# Patient Record
Sex: FEMALE | Race: WHITE | Employment: OTHER | URBAN - METROPOLITAN AREA
[De-identification: names, ages, dates, MRNs, and addresses within clinical notes are randomized per-mention and may not be internally consistent; named-entity substitution may affect disease eponyms.]

---

## 2017-06-07 PROBLEM — E55.9 VITAMIN D DEFICIENCY: Status: ACTIVE | Noted: 2017-06-07

## 2017-06-07 PROBLEM — K21.9 GASTROESOPHAGEAL REFLUX DISEASE WITHOUT ESOPHAGITIS: Status: ACTIVE | Noted: 2017-06-07

## 2018-05-31 ENCOUNTER — HOSPITAL ENCOUNTER (OUTPATIENT)
Dept: MAMMOGRAPHY | Age: 78
Discharge: HOME | End: 2018-05-31
Attending: INTERNAL MEDICINE
Payer: MEDICARE

## 2018-05-31 DIAGNOSIS — Z12.31 VISIT FOR SCREENING MAMMOGRAM: ICD-10-CM

## 2018-05-31 DIAGNOSIS — M85.89 OSTEOPENIA OF MULTIPLE SITES: ICD-10-CM

## 2018-05-31 PROCEDURE — 77067 SCR MAMMO BI INCL CAD: CPT

## 2018-05-31 PROCEDURE — 77080 DXA BONE DENSITY AXIAL: CPT

## 2019-10-08 ENCOUNTER — HOSPITAL ENCOUNTER (OUTPATIENT)
Dept: MAMMOGRAPHY | Age: 79
Discharge: HOME | End: 2019-10-08
Attending: INTERNAL MEDICINE
Payer: MEDICARE

## 2019-10-08 DIAGNOSIS — Z12.39 SCREENING FOR BREAST CANCER: ICD-10-CM

## 2019-10-08 PROCEDURE — 77067 SCR MAMMO BI INCL CAD: CPT

## 2020-02-19 PROBLEM — M25.561 RIGHT KNEE PAIN: Status: ACTIVE | Noted: 2020-02-19

## 2020-02-19 PROBLEM — M25.551 RIGHT HIP PAIN: Status: ACTIVE | Noted: 2020-02-19

## 2020-02-19 PROBLEM — M54.31 SCIATICA OF RIGHT SIDE: Status: ACTIVE | Noted: 2020-02-19

## 2020-02-19 PROBLEM — M54.16 LUMBAR RADICULOPATHY: Status: ACTIVE | Noted: 2020-02-19

## 2020-02-19 PROBLEM — M51.36 DDD (DEGENERATIVE DISC DISEASE), LUMBAR: Status: ACTIVE | Noted: 2020-02-19

## 2020-05-29 ENCOUNTER — APPOINTMENT (OUTPATIENT)
Dept: GENERAL RADIOLOGY | Age: 80
End: 2020-05-29
Attending: EMERGENCY MEDICINE
Payer: MEDICARE

## 2020-05-29 ENCOUNTER — HOSPITAL ENCOUNTER (EMERGENCY)
Age: 80
Discharge: HOME | End: 2020-05-29
Attending: EMERGENCY MEDICINE
Payer: MEDICARE

## 2020-05-29 VITALS
TEMPERATURE: 98 F | DIASTOLIC BLOOD PRESSURE: 70 MMHG | SYSTOLIC BLOOD PRESSURE: 151 MMHG | BODY MASS INDEX: 23.9 KG/M2 | HEIGHT: 64 IN | WEIGHT: 140 LBS | HEART RATE: 70 BPM | OXYGEN SATURATION: 99 % | RESPIRATION RATE: 20 BRPM

## 2020-05-29 DIAGNOSIS — S43.014A ANTERIOR DISLOCATION OF RIGHT SHOULDER, INITIAL ENCOUNTER: Primary | ICD-10-CM

## 2020-05-29 PROCEDURE — 74011250636 HC RX REV CODE- 250/636: Performed by: EMERGENCY MEDICINE

## 2020-05-29 PROCEDURE — 99285 EMERGENCY DEPT VISIT HI MDM: CPT

## 2020-05-29 PROCEDURE — 75810000301 HC ER LEVEL 1 CLOSED TREATMNT FRACTURE/DISLOCATION

## 2020-05-29 PROCEDURE — 73030 X-RAY EXAM OF SHOULDER: CPT

## 2020-05-29 PROCEDURE — 73020 X-RAY EXAM OF SHOULDER: CPT

## 2020-05-29 RX ORDER — PRAVASTATIN SODIUM 20 MG/1
20 TABLET ORAL
COMMUNITY
End: 2020-06-30 | Stop reason: ALTCHOICE

## 2020-05-29 RX ORDER — OXYCODONE HYDROCHLORIDE 5 MG/1
5 TABLET ORAL
Qty: 13 TAB | Refills: 0 | Status: SHIPPED | OUTPATIENT
Start: 2020-05-29 | End: 2020-06-01

## 2020-05-29 RX ORDER — PROPOFOL 10 MG/ML
200 INJECTION, EMULSION INTRAVENOUS
Status: COMPLETED | OUTPATIENT
Start: 2020-05-29 | End: 2020-05-29

## 2020-05-29 RX ADMIN — PROPOFOL INJECTABLE EMULSION 200 MG: 10 INJECTION, EMULSION INTRAVENOUS at 09:09

## 2020-05-29 RX ADMIN — SODIUM CHLORIDE 500 ML: 900 INJECTION, SOLUTION INTRAVENOUS at 09:09

## 2020-05-29 NOTE — DISCHARGE INSTRUCTIONS
Take pain medication as prescribed  May alternate between Tylenol or ibuprofen as well  Keep arm in sling  Call and arrange follow-up with orthopedics  Return to the ER for any new or life-threatening illnesses    Dislocated Shoulder: Care Instructions  Your Care Instructions     When the upper arm comes out of the shoulder socket, it is called a dislocated shoulder. After the doctor puts the shoulder back in place, he or she may put your arm in a sling or shoulder immobilizer. This will keep it from moving. Exercise and physical therapy can help your shoulder get strong and move normally again. It may take up to a year for your shoulder to heal and be free of pain. If your shoulder keeps coming out of place, talk to your doctor about surgery. It can prevent dislocations. You may have had a sedative to help you relax. You may be unsteady after having sedation. It can take a few hours for the medicine's effects to wear off. Common side effects of sedation include nausea, vomiting, and feeling sleepy or tired. The doctor has checked you carefully, but problems can develop later. If you notice any problems or new symptoms, get medical treatment right away. Follow-up care is a key part of your treatment and safety. Be sure to make and go to all appointments, and call your doctor if you are having problems. It's also a good idea to know your test results and keep a list of the medicines you take. How can you care for yourself at home? · If the doctor gave you a sedative:  ? For 24 hours, don't do anything that requires attention to detail. This includes going to work, making important decisions, or signing any legal documents. It takes time for the medicine's effects to completely wear off.  ? For your safety, do not drive or operate any machinery that could be dangerous. Wait until the medicine wears off and you can think clearly and react easily.   · If your doctor put your arm in a sling or shoulder immobilizer, wear it as directed. · Take pain medicines exactly as directed. ? If the doctor gave you a prescription medicine for pain, take it as prescribed. ? If you are not taking a prescription pain medicine, ask your doctor if you can take an over-the-counter medicine. · Put ice or a cold pack on your shoulder for 10 to 20 minutes at a time. Try to do this every 1 to 2 hours for the next 3 days (when you are awake). Put a thin cloth between the ice and your skin. · You may use warm packs after the first 3 days for 15 to 20 minutes at a time. This can ease pain. · If your doctor gave you exercises to do at home, do them exactly as your doctor told you. · Do not do anything that makes the pain worse. When should you call for help? [id number] anytime you think you may need emergency care. For example, call if:  · You have trouble breathing. · You passed out (lost consciousness). Call your doctor now or seek immediate medical care if:  · You have new or worse nausea or vomiting. · You have new or worse pain. · Your hand or fingers are cool or pale or change color. · You have tingling, weakness, or numbness in your hand or fingers. Watch closely for changes in your health, and be sure to contact your doctor if:  · You do not get better as expected. Where can you learn more? Go to http://vinh-mario.info/  Enter L766 in the search box to learn more about \"Dislocated Shoulder: Care Instructions. \"  Current as of: March 2, 2020               Content Version: 12.5  © 4152-7345 Healthwise, Incorporated. Care instructions adapted under license by [employer] (which disclaims liability or warranty for this information). If you have questions about a medical condition or this instruction, always ask your healthcare professional. [address] any warranty or liability for your use of this information.          Patient Education        Shoulder Dislocation: Rehab Exercises  Introduction  Here are some examples of exercises for you to try. The exercises may be suggested for a condition or for rehabilitation. Start each exercise slowly. Ease off the exercises if you start to have pain. You will be told when to start these exercises and which ones will work best for you. How to do the exercises  Shoulder flexion (lying down)   For this exercise, you will need a wand. To make a wand, use a piece of PVC pipe or a broom handle with the broom removed. Make the wand about a foot wider than your shoulders. 1. Lie on your back, holding a wand with your hands. Your palms should face down as you hold the wand. Place your hands slightly wider than your shoulders. 2. Keeping your elbows straight, slowly raise your arms over your head until you feel a stretch in your shoulders, upper back, and chest.  3. Hold 15 to 30 seconds. 4. Repeat 2 to 4 times. Shoulder blade squeeze   1. While standing with your arms at your sides, squeeze your shoulder blades together. Do not raise your shoulders as you are squeezing. 2. Hold for 6 seconds. 3. Repeat 8 to 12 times. Internal rotator strengthening exercise   For this exercise, you will need elastic exercise material, such as surgical tubing or Thera-Band. 1. Begin by tying a piece of elastic exercise material to a doorknob. 2. Stand or sit with your shoulder relaxed and your elbow bent 90 degrees (like the angle of the letter \"L\"). Your upper arm should rest comfortably against your side. Squeeze a rolled towel between your elbow and your body for comfort and to help keep your arm at your side. 3. Hold one end of the elastic band in the hand of the painful arm. 4. Rotate your forearm toward your body until it touches your belly. 5. Keep your elbow and upper arm firmly tucked against the towel roll or the side of your body during this movement. 6. Repeat 8 to 12 times. Isometric shoulder external rotation   1.  Stand with your affected arm close to a wall. 2. Bend your arm up so your elbow is at a 90 degree angle (like the letter \"L\"), and turn your palm as if you are about to shake someone's hand. 3. Hold your forearm and elbow close to the wall. Press the back of your hand into the wall with moderate pressure. 4. Hold for a count of 6.  5. Repeat 8 to 12 times. Isometric shoulder abduction   1. Stand with your affected arm close to a wall. 2. Bend your arm up so your elbow is at a 90 degree angle (like the letter \"L\"), and turn your palm as if you are about to shake someone's hand. 3. Hold your forearm and elbow close to the wall. Press your elbow into the wall with moderate pressure. 4. Hold for a count of 6.  5. Repeat 8 to 12 times. Wall push-ups   1. Stand against a wall with your feet about 12 to 24 inches away from the wall. If you feel any pain when you do this exercise, stand closer to the wall. 2. Place your hands on the wall slightly wider apart than your shoulders, and lean forward. 3. Gently lean your body toward the wall. Then push back to your starting position. Keep the motion smooth and controlled. 4. Repeat 8 to 12 times. Follow-up care is a key part of your treatment and safety. Be sure to make and go to all appointments, and call your doctor if you are having problems. It's also a good idea to know your test results and keep a list of the medicines you take. Where can you learn more? Go to http://vinh-mario.info/  Enter V550 in the search box to learn more about \"Shoulder Dislocation: Rehab Exercises. \"  Current as of: March 2, 2020               Content Version: 12.5  © 9897-7809 Healthwise, [employer]. Care instructions adapted under license by [employer] (which disclaims liability or warranty for this information).  If you have questions about a medical condition or this instruction, always ask your healthcare professional. Mary Nettles disclaims any warranty or liability for your use of this information.

## 2020-05-29 NOTE — ED NOTES
I have reviewed discharge instructions with the patient. The patient verbalized understanding. Patient left ED via Discharge Method: ambulatory to Home with (spouse). Opportunity for questions and clarification provided. Patient given 1 scripts. To continue your aftercare when you leave the hospital, you may receive an automated call from our care team to check in on how you are doing. This is a free service and part of our promise to provide the best care and service to meet your aftercare needs.  If you have questions, or wish to unsubscribe from this service please call [phone]. Thank you for Choosing our [facility] Emergency Department.

## 2020-05-29 NOTE — ED PROVIDER NOTES
Patient presents to the ER complaint of right shoulder pain. Patient reports she had a mechanical fall this morning. States she landed on her right arm. Reports significant pain in her right shoulder. Denies any head trauma loss of consciousness. Denies any chest pain or abdominal pain. Has been ambulatory without any difficulty. The history is provided by the patient. Fall   The accident occurred less than 1 hour ago. The fall occurred while walking. The pain is present in the right shoulder. The pain is at a severity of 5/10. The pain is moderate. She was ambulatory at the scene. There was no entrapment after the fall. There was no alcohol use involved in the accident. Pertinent negatives include no visual change, no numbness, no abdominal pain, no vomiting, no headaches, no loss of consciousness and no tingling. The symptoms are aggravated by use of injured limb. Past Medical History:   Diagnosis Date    DDD (degenerative disc disease), lumbar 2/19/2020    DM2 (diabetes mellitus, type 2) ([facility] [address] 3/4/2014    Gastroesophageal reflux disease without esophagitis 5/22/2015    HLD (hyperlipidemia)     HTN (hypertension)     Menopause     Sciatica of right side 2/19/2020    Type II or unspecified type diabetes mellitus without mention of complication, uncontrolled 9/4/2014       Past Surgical History:   Procedure Laterality Date    HX COLONOSCOPY  01/25/2010    HX HYSTERECTOMY      10 years ago    HX OTHER SURGICAL      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  HX PARTIAL THYROIDECTOMY      Lobectomy for benign mass.     NEUROLOGICAL PROCEDURE UNLISTED  2014    [facility] S[facility] [place]         Family History:   Problem Relation Age of Onset    Heart Disease Father 76    Kidney Disease Father         hepatitis, post-surgery (tranfusion)    Cancer Mother [de-identified]        Breast    Breast Cancer Mother 80    Alzheimer Sister [de-identified]    Arthritis-osteo Sister     Arthritis-osteo Brother         Back surgery    Diabetes Neg Hx        Social History     Socioeconomic History    Marital status:      Spouse name: Not on file    Number of children: Not on file    Years of education: Not on file    Highest education level: Not on file   Occupational History    Not on file   Social Needs    Financial resource strain: Not on file    Food insecurity     Worry: Not on file     Inability: Not on file    Transportation needs     Medical: Not on file     Non-medical: Not on file   Tobacco Use    Smoking status: Never Smoker    Smokeless tobacco: Never Used   Substance and Sexual Activity    Alcohol use: No    Drug use: No    Sexual activity: Yes     Partners: Male     Birth control/protection: Surgical   Lifestyle    Physical activity     Days per week: Not on file     Minutes per session: Not on file    Stress: Not on file   Relationships    Social connections     Talks on phone: Not on file     Gets together: Not on file     Attends Sabianism service: Not on file     Active member of club or organization: Not on file     Attends meetings of clubs or organizations: Not on file     Relationship status: Not on file    Intimate partner violence     Fear of current or ex partner: Not on file     Emotionally abused: Not on file     Physically abused: Not on file     Forced sexual activity: Not on file   Other Topics Concern     Service Not Asked    Blood Transfusions Not Asked    Caffeine Concern Not Asked    Occupational Exposure Not Asked   Amee Stairs Hazards Not Asked    Sleep Concern Not Asked    Stress Concern Not Asked    Weight Concern Not Asked    Special Diet Not Asked    Back Care Not Asked    Exercise Not Asked    Bike Helmet Not Asked   [place] Not Asked    Self-Exams Not Asked   Social History Narrative    Not on file         ALLERGIES: Acetaminophen and Lortab [hydrocodone-acetaminophen]    Review of Systems   Constitutional: Negative for chills, diaphoresis and fatigue.    HENT: Negative for facial swelling. Eyes: Negative for photophobia. Respiratory: Negative for choking, chest tightness and shortness of breath. Cardiovascular: Negative for chest pain. Gastrointestinal: Negative for abdominal pain and vomiting. Musculoskeletal: Negative for gait problem, neck pain and neck stiffness. Neurological: Negative for tingling, loss of consciousness, numbness and headaches. Psychiatric/Behavioral: Negative for confusion and hallucinations. All other systems reviewed and are negative. Vitals:    05/29/20 0814   BP: 163/70   Pulse: 63   Resp: 16   Temp: 98 °F (36.7 °C)   SpO2: 97%   Weight: 63.5 kg (140 lb)   Height: 5' 4\" (1.626 m)            Physical Exam  Vitals signs and nursing note reviewed. Constitutional:       Appearance: Normal appearance. HENT:      Head: Normocephalic and atraumatic. Eyes:      Extraocular Movements: Extraocular movements intact. Conjunctiva/sclera: Conjunctivae normal.      Pupils: Pupils are equal, round, and reactive to light. Neck:      Musculoskeletal: Normal range of motion and neck supple. Cardiovascular:      Rate and Rhythm: Normal rate and regular rhythm. Pulses: Normal pulses. Heart sounds: Normal heart sounds. No murmur. Pulmonary:      Effort: Pulmonary effort is normal. No respiratory distress. Breath sounds: Normal breath sounds. Musculoskeletal:      Right shoulder: She exhibits decreased range of motion and tenderness. Right elbow: She exhibits normal range of motion. No tenderness found. Right wrist: She exhibits normal range of motion and no tenderness. Skin:     Capillary Refill: Capillary refill takes less than 2 seconds. Neurological:      General: No focal deficit present. Mental Status: She is alert and oriented to person, place, and time. Mental status is at baseline. MDM  Number of Diagnoses or Management Options  Diagnosis management comments:  Will obtain x-ray of right shoulder.     8:36 AM  X-rays consistent with dislocation, likely anterior inferior           Amount and/or Complexity of Data Reviewed  Tests in the radiology section of CPT®: ordered and reviewed    Risk of Complications, Morbidity, and/or Mortality  Presenting problems: low  Diagnostic procedures: low  Management options: low    Patient Progress  Patient progress: stable         Procedural Sedation  Date/Time: 5/29/2020 9:19 AM  Performed by: Leonila Shaw MD  Authorized by: Leonila Shaw MD     Consent:     Consent obtained:  Written    Consent given by:  Patient    Risks discussed:  Prolonged hypoxia resulting in organ damage, prolonged sedation necessitating reversal, respiratory compromise necessitating ventilatory assistance and intubation, inadequate sedation and nausea    Alternatives discussed:  Analgesia without sedation and anxiolysis  Indications:     Procedure performed:  Dislocation reduction    Procedure necessitating sedation performed by:  Physician performing sedation    Intended level of sedation:  Moderate (conscious sedation)  Pre-sedation assessment:     Time since last food or drink:  4 hours    ASA classification: class 2 - patient with mild systemic disease      Neck mobility: normal      Mouth opening:  3 or more finger widths    Thyromental distance:  3 finger widths    Mallampati score:  II - soft palate, uvula, fauces visible    Pre-sedation assessments completed and reviewed: airway patency, cardiovascular function, hydration status, mental status, nausea/vomiting, pain level, respiratory function and temperature      History of difficult intubation: no      Pre-sedation assessment completed:  5/29/2020 9:00 AM  Immediate pre-procedure details:     Reassessment: Patient reassessed immediately prior to procedure      Reviewed: vital signs      Verified: bag valve mask available, emergency equipment available and intubation equipment available    Procedure details (see MAR for exact dosages):     Sedation start time:  5/29/2020 9:10 AM    Preoxygenation:  Nasal cannula    Sedation:  Propofol    Intra-procedure monitoring:  Blood pressure monitoring, continuous capnometry, continuous pulse oximetry, frequent LOC assessments and cardiac monitor    Intra-procedure events: none      Sedation end time:  5/29/2020 9:20 AM    Total sedation time (minutes):  10  Post-procedure details:     Post-sedation assessment completed:  5/29/2020 9:34 AM    Attendance: Constant attendance by certified staff until patient recovered      Recovery: Patient returned to pre-procedure baseline      Post-sedation assessments completed and reviewed: airway patency, cardiovascular function, hydration status, mental status, nausea/vomiting and respiratory function      Specimens recovered:  None    Patient is stable for discharge or admission: yes      Patient tolerance: Tolerated well, no immediate complications  Reduction of Joint  Date/Time: 5/29/2020 9:21 AM  Performed by: Godfrey Mccarthy MD  Authorized by: Godfrey Mccarthy MD     Consent:     Consent obtained:  Written    Consent given by:  Patient    Risks discussed:  Nerve damage, pain and vascular damage    Alternatives discussed:  Delayed treatment, alternative treatment, observation and referral  Injury:     Injury location:  Shoulder    Shoulder injury location:  R shoulder    Hill-Sachs deformity: no    Pre-procedure assessment:     Neurological function: normal      Distal perfusion: normal      Range of motion: normal    Sedation:     Sedation type: Moderate (conscious) sedation  Procedure details:     Manipulation performed: yes      Skeletal traction used: yes      Reduction successful: yes      X-ray confirmed reduction: yes      Immobilization:  Sling  Post-procedure assessment:     Neurological function: normal      Distal perfusion: normal      Range of motion: normal      Patient tolerance of procedure:   Tolerated well, no immediate complications

## 2020-05-29 NOTE — ED TRIAGE NOTES
Pt states she slipped on a step this morning and fell onto the hardwood floor. States pain in the right shoulder.

## 2020-06-01 ENCOUNTER — PATIENT OUTREACH (OUTPATIENT)
Dept: CASE MANAGEMENT | Age: 80
End: 2020-06-01

## 2020-06-01 NOTE — PROGRESS NOTES
Patient contacted regarding recent visit for viral symptoms. This [place] contacted the patient by telephone to perform post discharge call. Verified name and  with patient as identifiers. Provided introduction to self, and reason for call due to viral symptoms of infection and/or exposure to COVID-19. Patient presented to emergency department/flu clinic with complaints of viral symptoms/exposure to COVID. Patient reports symptoms are improving. Due to no new or worsening symptoms the RN CTN/ACM was not notified for escalation. Discussed exposure protocols and quarantine with CDC Guidelines What To Do If You Are Sick    Patient was given an opportunity for questions and concerns. Stay home except to get medical care  Separate yourself from other people and animals in your home  Call ahead before visiting your doctor  Wear a facemask  Cover your coughs and sneezes  Clean your hands often  Avoid sharing personal household items  Clean all high-touch surfaces everyday    Monitor your symptoms  Seek prompt medical attention if your illness is worsening (e.g., difficulty breathing). Before seeking care, call your healthcare provider and tell them that you have, or are being evaluated for, COVID-19. Put on a facemask before you enter the facility. These steps will help the healthcare provider's office to keep other people in the office or waiting room from getting infected or exposed. Ask your healthcare provider to call the local or Critical access hospital health department. Persons who are placed under If you have a medical emergency and need to call 911, notify the dispatch personnel that you have, or are being evaluated for COVID-19. If possible, put on a facemask before emergency medical services arrive. The patient agrees to contact the [facility] exposure line [phone], local health department [facility] [address] [place] [address]: ([phone]) and PCP office for questions related to their healthcare. Author provided contact information for future reference. Patient/family/caregiver given information for [employer] and agrees to enroll no  Patient preferred e-mail:   Patient preferred phone contact:    Based on Loop alert triggers, patient will be contacted by nurse care manager for worsening symptoms. Patient stated she will think about signing up for [facility]. She wanted to talk it over with her . She said she will let me know when I make my Follow-up call in 7-14 days. Patient stated she saw Dr Thomas Almodovar this morning, she does not need surgery on her shoulder.

## 2021-04-26 ENCOUNTER — HOSPITAL ENCOUNTER (EMERGENCY)
Age: 81
Discharge: HOME | DRG: 389 | End: 2021-04-26
Attending: EMERGENCY MEDICINE
Payer: MEDICARE

## 2021-04-26 ENCOUNTER — APPOINTMENT (OUTPATIENT)
Dept: CT IMAGING | Age: 81
DRG: 389 | End: 2021-04-26
Attending: PHYSICIAN ASSISTANT
Payer: MEDICARE

## 2021-04-26 VITALS
DIASTOLIC BLOOD PRESSURE: 74 MMHG | HEART RATE: 64 BPM | BODY MASS INDEX: 22.88 KG/M2 | RESPIRATION RATE: 16 BRPM | TEMPERATURE: 98.1 F | HEIGHT: 64 IN | OXYGEN SATURATION: 99 % | WEIGHT: 134 LBS | SYSTOLIC BLOOD PRESSURE: 176 MMHG

## 2021-04-26 DIAGNOSIS — R10.84 ABDOMINAL PAIN, GENERALIZED: ICD-10-CM

## 2021-04-26 DIAGNOSIS — N39.0 URINARY TRACT INFECTION WITHOUT HEMATURIA, SITE UNSPECIFIED: Primary | ICD-10-CM

## 2021-04-26 LAB
ALBUMIN SERPL-MCNC: 4.2 G/DL (ref 3.2–4.6)
ALBUMIN/GLOB SERPL: 1.2 {RATIO} (ref 1.2–3.5)
ALP SERPL-CCNC: 66 U/L (ref 50–136)
ALT SERPL-CCNC: 14 U/L (ref 12–65)
ANION GAP SERPL CALC-SCNC: 5 MMOL/L (ref 7–16)
AST SERPL-CCNC: 11 U/L (ref 15–37)
BACTERIA URNS QL MICRO: ABNORMAL /HPF
BASOPHILS # BLD: 0.1 K/UL (ref 0–0.2)
BASOPHILS NFR BLD: 1 % (ref 0–2)
BILIRUB SERPL-MCNC: 0.4 MG/DL (ref 0.2–1.1)
BUN SERPL-MCNC: 17 MG/DL (ref 8–23)
CALCIUM SERPL-MCNC: 9.9 MG/DL (ref 8.3–10.4)
CASTS URNS QL MICRO: ABNORMAL /LPF
CHLORIDE SERPL-SCNC: 104 MMOL/L (ref 98–107)
CO2 SERPL-SCNC: 30 MMOL/L (ref 21–32)
CREAT SERPL-MCNC: 0.73 MG/DL (ref 0.6–1)
DIFFERENTIAL METHOD BLD: ABNORMAL
EOSINOPHIL # BLD: 0.1 K/UL (ref 0–0.8)
EOSINOPHIL NFR BLD: 1 % (ref 0.5–7.8)
EPI CELLS #/AREA URNS HPF: ABNORMAL /HPF
ERYTHROCYTE [DISTWIDTH] IN BLOOD BY AUTOMATED COUNT: 12.2 % (ref 11.9–14.6)
GLOBULIN SER CALC-MCNC: 3.4 G/DL (ref 2.3–3.5)
GLUCOSE BLD STRIP.AUTO-MCNC: 110 MG/DL (ref 65–100)
GLUCOSE SERPL-MCNC: 164 MG/DL (ref 65–100)
HCT VFR BLD AUTO: 41 % (ref 35.8–46.3)
HGB BLD-MCNC: 14.1 G/DL (ref 11.7–15.4)
IMM GRANULOCYTES # BLD AUTO: 0 K/UL (ref 0–0.5)
IMM GRANULOCYTES NFR BLD AUTO: 0 % (ref 0–5)
LACTATE SERPL-SCNC: 1.7 MMOL/L (ref 0.4–2)
LACTATE SERPL-SCNC: 2.1 MMOL/L (ref 0.4–2)
LIPASE SERPL-CCNC: 93 U/L (ref 73–393)
LYMPHOCYTES # BLD: 1.4 K/UL (ref 0.5–4.6)
LYMPHOCYTES NFR BLD: 14 % (ref 13–44)
MCH RBC QN AUTO: 33.2 PG (ref 26.1–32.9)
MCHC RBC AUTO-ENTMCNC: 34.4 G/DL (ref 31.4–35)
MCV RBC AUTO: 96.5 FL (ref 79.6–97.8)
MONOCYTES # BLD: 0.7 K/UL (ref 0.1–1.3)
MONOCYTES NFR BLD: 7 % (ref 4–12)
NEUTS SEG # BLD: 7.7 K/UL (ref 1.7–8.2)
NEUTS SEG NFR BLD: 78 % (ref 43–78)
NRBC # BLD: 0 K/UL (ref 0–0.2)
PLATELET # BLD AUTO: 237 K/UL (ref 150–450)
PMV BLD AUTO: 9.6 FL (ref 9.4–12.3)
POTASSIUM SERPL-SCNC: 3.9 MMOL/L (ref 3.5–5.1)
PROT SERPL-MCNC: 7.6 G/DL (ref 6.3–8.2)
RBC # BLD AUTO: 4.25 M/UL (ref 4.05–5.2)
RBC #/AREA URNS HPF: ABNORMAL /HPF
SERVICE CMNT-IMP: ABNORMAL
SODIUM SERPL-SCNC: 139 MMOL/L (ref 136–145)
WBC # BLD AUTO: 10 K/UL (ref 4.3–11.1)
WBC URNS QL MICRO: ABNORMAL /HPF

## 2021-04-26 PROCEDURE — 83605 ASSAY OF LACTIC ACID: CPT

## 2021-04-26 PROCEDURE — 74011250636 HC RX REV CODE- 250/636: Performed by: PHYSICIAN ASSISTANT

## 2021-04-26 PROCEDURE — 81015 MICROSCOPIC EXAM OF URINE: CPT

## 2021-04-26 PROCEDURE — 96361 HYDRATE IV INFUSION ADD-ON: CPT

## 2021-04-26 PROCEDURE — 74011000636 HC RX REV CODE- 636: Performed by: EMERGENCY MEDICINE

## 2021-04-26 PROCEDURE — 82962 GLUCOSE BLOOD TEST: CPT

## 2021-04-26 PROCEDURE — 83690 ASSAY OF LIPASE: CPT

## 2021-04-26 PROCEDURE — 85025 COMPLETE CBC W/AUTO DIFF WBC: CPT

## 2021-04-26 PROCEDURE — 74177 CT ABD & PELVIS W/CONTRAST: CPT

## 2021-04-26 PROCEDURE — 74011000258 HC RX REV CODE- 258: Performed by: EMERGENCY MEDICINE

## 2021-04-26 PROCEDURE — 74011250637 HC RX REV CODE- 250/637: Performed by: PHYSICIAN ASSISTANT

## 2021-04-26 PROCEDURE — 96375 TX/PRO/DX INJ NEW DRUG ADDON: CPT

## 2021-04-26 PROCEDURE — 99284 EMERGENCY DEPT VISIT MOD MDM: CPT

## 2021-04-26 PROCEDURE — 96374 THER/PROPH/DIAG INJ IV PUSH: CPT

## 2021-04-26 PROCEDURE — 80053 COMPREHEN METABOLIC PANEL: CPT

## 2021-04-26 PROCEDURE — 96376 TX/PRO/DX INJ SAME DRUG ADON: CPT

## 2021-04-26 RX ORDER — SODIUM CHLORIDE 0.9 % (FLUSH) 0.9 %
10 SYRINGE (ML) INJECTION
Status: COMPLETED | OUTPATIENT
Start: 2021-04-26 | End: 2021-04-26

## 2021-04-26 RX ORDER — CEPHALEXIN 500 MG/1
500 CAPSULE ORAL 4 TIMES DAILY
Qty: 28 CAP | Refills: 0 | Status: SHIPPED | OUTPATIENT
Start: 2021-04-26 | End: 2021-05-08

## 2021-04-26 RX ORDER — ONDANSETRON 4 MG/1
4 TABLET, ORALLY DISINTEGRATING ORAL
Qty: 12 TAB | Refills: 0 | Status: SHIPPED | OUTPATIENT
Start: 2021-04-26 | End: 2021-05-12 | Stop reason: ALTCHOICE

## 2021-04-26 RX ORDER — DICYCLOMINE HYDROCHLORIDE 20 MG/1
20 TABLET ORAL
Qty: 30 TAB | Refills: 0 | Status: SHIPPED | OUTPATIENT
Start: 2021-04-26 | End: 2021-05-06

## 2021-04-26 RX ORDER — ONDANSETRON 2 MG/ML
4 INJECTION INTRAMUSCULAR; INTRAVENOUS
Status: COMPLETED | OUTPATIENT
Start: 2021-04-26 | End: 2021-04-26

## 2021-04-26 RX ORDER — HYOSCYAMINE SULFATE 0.12 MG/1
0.12 TABLET SUBLINGUAL
Status: COMPLETED | OUTPATIENT
Start: 2021-04-26 | End: 2021-04-26

## 2021-04-26 RX ORDER — MORPHINE SULFATE 4 MG/ML
4 INJECTION INTRAVENOUS
Status: COMPLETED | OUTPATIENT
Start: 2021-04-26 | End: 2021-04-26

## 2021-04-26 RX ADMIN — ONDANSETRON 4 MG: 2 INJECTION INTRAMUSCULAR; INTRAVENOUS at 11:49

## 2021-04-26 RX ADMIN — SODIUM CHLORIDE 1000 ML: 900 INJECTION, SOLUTION INTRAVENOUS at 10:18

## 2021-04-26 RX ADMIN — SODIUM CHLORIDE 100 ML: 900 INJECTION, SOLUTION INTRAVENOUS at 12:19

## 2021-04-26 RX ADMIN — HYOSCYAMINE SULFATE 0.12 MG: 0.12 TABLET ORAL; SUBLINGUAL at 13:24

## 2021-04-26 RX ADMIN — IOPAMIDOL 100 ML: 755 INJECTION, SOLUTION INTRAVENOUS at 12:19

## 2021-04-26 RX ADMIN — MORPHINE SULFATE 4 MG: 4 INJECTION INTRAVENOUS at 11:49

## 2021-04-26 RX ADMIN — Medication 10 ML: at 12:19

## 2021-04-26 RX ADMIN — MORPHINE SULFATE 4 MG: 4 INJECTION INTRAVENOUS at 10:15

## 2021-04-26 RX ADMIN — ONDANSETRON 4 MG: 2 INJECTION INTRAMUSCULAR; INTRAVENOUS at 10:15

## 2021-04-26 RX ADMIN — DIATRIZOATE MEGLUMINE AND DIATRIZOATE SODIUM 15 ML: 660; 100 LIQUID ORAL; RECTAL at 10:59

## 2021-04-26 NOTE — ED PROVIDER NOTES
Patient is a pleasant 61-year-old female with past medical history significant for diabetes presents the emergency room with chief complaint of lower abdominal pain that started approximate 7:00 this morning. Pain is most severe around her umbilicus. States she has never experienced pain like this before, pain is worse with palpation. Associate with some mild nausea, vomiting. No aggravating or alleviating factors notified. Denies fever, chills, nausea, vomiting, radiating pain, chest pain, shortness of breath, dizziness. 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  HX PARTIAL THYROIDECTOMY      Lobectomy for benign mass.     NEUROLOGICAL PROCEDURE UNLISTED  2014    Brain Sx-[facility]         Family History:   Problem Relation Age of Onset    Heart Disease Father 76    Kidney Disease Father         hepatitis, post-surgery (tranfusion)    Cancer Mother [de-identified]        Breast    Breast Cancer Mother 80    Alzheimer Sister [de-identified]    Arthritis-osteo Sister     Arthritis-osteo Brother         Back surgery    Diabetes Neg Hx        Social History     Socioeconomic History    Marital status:      Spouse name: Not on file    Number of children: Not on file    Years of education: Not on file    Highest education level: Not on file   Occupational History    Not on file   Social Needs    Financial resource strain: Not on file    Food insecurity     Worry: Not on file     Inability: Not on file    Transportation needs     Medical: Not on file Non-medical: Not on file   Tobacco Use    Smoking status: Never Smoker    Smokeless tobacco: Never Used   Substance and Sexual Activity    Alcohol use: No    Drug use: No    Sexual activity: Yes     Partners: Male     Birth control/protection: Surgical   Lifestyle    Physical activity     Days per week: Not on file     Minutes per session: Not on file    Stress: Not on file   Relationships    Social connections     Talks on phone: Not on file     Gets together: Not on file     Attends Scientologist service: Not on file     Active member of club or organization: Not on file     Attends meetings of clubs or organizations: Not on file     Relationship status: Not on file    Intimate partner violence     Fear of current or ex partner: Not on file     Emotionally abused: Not on file     Physically abused: Not on file     Forced sexual activity: Not on file   Other Topics Concern     Service Not Asked    Blood Transfusions Not Asked    Caffeine Concern Not Asked    Occupational Exposure Not Asked   [employer] Hazards Not Asked    Sleep Concern Not Asked    Stress Concern Not Asked    Weight Concern Not Asked    Special Diet Not Asked    Back Care Not Asked    Exercise Not Asked    Bike Helmet Not Asked   [address] Not Asked    Self-Exams Not Asked   Social History Narrative    Not on file         ALLERGIES: Lortab [hydrocodone-acetaminophen] and Acetaminophen    Review of Systems   Constitutional: Negative for chills and fever. HENT: Negative for facial swelling. Respiratory: Negative for chest tightness and shortness of breath. Cardiovascular: Negative for chest pain. Gastrointestinal: Positive for abdominal pain. Negative for abdominal distention, anal bleeding, blood in stool, constipation, diarrhea, nausea, rectal pain and vomiting. Musculoskeletal: Negative for myalgias. Neurological: Negative for headaches. Psychiatric/Behavioral: Negative for confusion.    All other systems reviewed and are negative. Vitals:    04/26/21 1003   BP: (!) 199/79   Pulse: (!) 56   Resp: 18   Temp: 98.3 °F (36.8 °C)   SpO2: 99%   Weight: 60.8 kg (134 lb)   Height: 5' 4\" (1.626 m)            Physical Exam  Vitals signs and nursing note reviewed. Constitutional:       General: She is not in acute distress. Appearance: Normal appearance. She is well-developed and normal weight. She is not ill-appearing, toxic-appearing or diaphoretic. HENT:      Head: Normocephalic and atraumatic. Mouth/Throat:      Mouth: Mucous membranes are dry. Eyes:      Pupils: Pupils are equal, round, and reactive to light. Cardiovascular:      Rate and Rhythm: Normal rate and regular rhythm. Pulmonary:      Effort: No respiratory distress. Breath sounds: Normal breath sounds. Abdominal:      General: Abdomen is flat. Palpations: Abdomen is soft. Tenderness: There is abdominal tenderness. There is rebound. There is no guarding. Skin:     General: Skin is warm and dry. Neurological:      Mental Status: She is alert and oriented to person, place, and time. Mental status is at baseline. Psychiatric:         Mood and Affect: Mood normal.          MDM  Number of Diagnoses or Management Options  Abdominal pain, generalized: minor  Urinary tract infection without hematuria, site unspecified: minor  Diagnosis management comments: This is a 81-year-old female who presented to the emergency room for chief complaint of lower abdominal pain that started at 7:00 this morning. Her work-up today is largely unremarkable, her urine did show that she has a urinary tract infection for which I will treat with oral Keflex. Lab work abnormalities include a lactic acid of 2.1 that trended down to 1.7 after 1 L of normal saline. CT scan of her belly demonstrated a nonspecific appearance of several small and large bowel loops possibly infectious or inflammatory process.   Her pain improved in the emergency room with administration of analgesia. On reexamination her belly is soft and nontender, will treat conservatively with Bentyl, Zofran. She has been instructed to return to the emergency room if she develops breakthrough nausea and vomiting, fever, severe abdominal pain. Otherwise she has been instructed to follow-up with gastroenterology for which referral has been given at time of discharge. At time of discharge she is resting comfortably with normal vital signs, afebrile, 99% oxygen on room air. Amount and/or Complexity of Data Reviewed  Clinical lab tests: ordered and reviewed  Tests in the radiology section of CPT®: ordered and reviewed  Tests in the medicine section of CPT®: ordered and reviewed  Independent visualization of images, tracings, or specimens: yes    Risk of Complications, Morbidity, and/or Mortality  Presenting problems: moderate  Diagnostic procedures: moderate  Management options: moderate    Patient Progress  Patient progress: improved    ED Course as of Apr 26 1020   Mon Apr 26, 2021   1018 CT ordered to evaluate for intra-abdominal pathology. Pain meds ordered, Zofran to keep patient comfortable.     [MO]      ED Course User Index  [MO] Brianna Carranza, [id number]       Procedures

## 2021-04-26 NOTE — ED TRIAGE NOTES
Pt here with c/o lower abd pain since this morning about 0700 and it is associated with lower back pain and N/V. Denies abd hx or kidney stones. Masked.

## 2021-04-26 NOTE — ED NOTES
I have reviewed discharge instructions with the patient. The patient verbalized understanding. Patient left ED via Discharge Method: ambulatory to Home with self. Opportunity for questions and clarification provided. Patient given 3 scripts. To continue your aftercare when you leave the hospital, you may receive an automated call from our care team to check in on how you are doing. This is a free service and part of our promise to provide the best care and service to meet your aftercare needs.  If you have questions, or wish to unsubscribe from this service please call [phone]. Thank you for Choosing our [facility] Department.

## 2021-04-27 ENCOUNTER — HOSPITAL ENCOUNTER (INPATIENT)
Age: 81
LOS: 11 days | Discharge: HOME HEALTH | DRG: 389 | End: 2021-05-08
Attending: EMERGENCY MEDICINE | Admitting: FAMILY MEDICINE
Payer: MEDICARE

## 2021-04-27 ENCOUNTER — APPOINTMENT (OUTPATIENT)
Dept: GENERAL RADIOLOGY | Age: 81
DRG: 389 | End: 2021-04-27
Attending: FAMILY MEDICINE
Payer: MEDICARE

## 2021-04-27 DIAGNOSIS — B99.9 INTRA-ABDOMINAL INFECTION: ICD-10-CM

## 2021-04-27 DIAGNOSIS — D72.829 LEUKOCYTOSIS, UNSPECIFIED TYPE: ICD-10-CM

## 2021-04-27 DIAGNOSIS — K56.609 SBO (SMALL BOWEL OBSTRUCTION) (HCC): ICD-10-CM

## 2021-04-27 DIAGNOSIS — E83.42 HYPOMAGNESEMIA: ICD-10-CM

## 2021-04-27 DIAGNOSIS — R11.2 NON-INTRACTABLE VOMITING WITH NAUSEA, UNSPECIFIED VOMITING TYPE: ICD-10-CM

## 2021-04-27 DIAGNOSIS — N39.0 URINARY TRACT INFECTION WITHOUT HEMATURIA, SITE UNSPECIFIED: ICD-10-CM

## 2021-04-27 DIAGNOSIS — A41.9 SEPSIS WITHOUT ACUTE ORGAN DYSFUNCTION, DUE TO UNSPECIFIED ORGANISM (HCC): Primary | ICD-10-CM

## 2021-04-27 PROBLEM — K52.9 ACUTE GASTROENTERITIS: Status: ACTIVE | Noted: 2021-04-27

## 2021-04-27 LAB
ALBUMIN SERPL-MCNC: 3.7 G/DL (ref 3.2–4.6)
ALBUMIN/GLOB SERPL: 1.1 {RATIO} (ref 1.2–3.5)
ALP SERPL-CCNC: 60 U/L (ref 50–130)
ALT SERPL-CCNC: 12 U/L (ref 12–65)
ANION GAP SERPL CALC-SCNC: 12 MMOL/L (ref 7–16)
AST SERPL-CCNC: 14 U/L (ref 15–37)
BASOPHILS # BLD: 0.1 K/UL (ref 0–0.2)
BASOPHILS NFR BLD: 1 % (ref 0–2)
BILIRUB SERPL-MCNC: 0.7 MG/DL (ref 0.2–1.1)
BUN SERPL-MCNC: 30 MG/DL (ref 8–23)
CALCIUM SERPL-MCNC: 9.2 MG/DL (ref 8.3–10.4)
CHLORIDE SERPL-SCNC: 100 MMOL/L (ref 98–107)
CO2 SERPL-SCNC: 25 MMOL/L (ref 21–32)
CREAT SERPL-MCNC: 0.8 MG/DL (ref 0.6–1)
DIFFERENTIAL METHOD BLD: ABNORMAL
EOSINOPHIL # BLD: 0 K/UL (ref 0–0.8)
EOSINOPHIL NFR BLD: 0 % (ref 0.5–7.8)
ERYTHROCYTE [DISTWIDTH] IN BLOOD BY AUTOMATED COUNT: 12.3 % (ref 11.9–14.6)
GLOBULIN SER CALC-MCNC: 3.3 G/DL (ref 2.3–3.5)
GLUCOSE BLD STRIP.AUTO-MCNC: 138 MG/DL (ref 65–100)
GLUCOSE SERPL-MCNC: 168 MG/DL (ref 65–100)
HCT VFR BLD AUTO: 42 % (ref 35.8–46.3)
HGB BLD-MCNC: 14.6 G/DL (ref 11.7–15.4)
IMM GRANULOCYTES # BLD AUTO: 0 K/UL (ref 0–0.5)
IMM GRANULOCYTES NFR BLD AUTO: 0 % (ref 0–5)
LACTATE SERPL-SCNC: 1.7 MMOL/L (ref 0.4–2)
LACTATE SERPL-SCNC: 2.1 MMOL/L (ref 0.4–2)
LIPASE SERPL-CCNC: 48 U/L (ref 73–393)
LYMPHOCYTES # BLD: 1.1 K/UL (ref 0.5–4.6)
LYMPHOCYTES NFR BLD: 7 % (ref 13–44)
MCH RBC QN AUTO: 33.3 PG (ref 26.1–32.9)
MCHC RBC AUTO-ENTMCNC: 34.8 G/DL (ref 31.4–35)
MCV RBC AUTO: 95.9 FL (ref 79.6–97.8)
MONOCYTES # BLD: 1.4 K/UL (ref 0.1–1.3)
MONOCYTES NFR BLD: 9 % (ref 4–12)
NEUTS SEG # BLD: 13.3 K/UL (ref 1.7–8.2)
NEUTS SEG NFR BLD: 83 % (ref 43–78)
NRBC # BLD: 0 K/UL (ref 0–0.2)
PLATELET # BLD AUTO: 244 K/UL (ref 150–450)
PMV BLD AUTO: 10.5 FL (ref 9.4–12.3)
POTASSIUM SERPL-SCNC: 3.8 MMOL/L (ref 3.5–5.1)
PROT SERPL-MCNC: 7 G/DL (ref 6.3–8.2)
RBC # BLD AUTO: 4.38 M/UL (ref 4.05–5.2)
SERVICE CMNT-IMP: ABNORMAL
SODIUM SERPL-SCNC: 137 MMOL/L (ref 136–145)
WBC # BLD AUTO: 16 K/UL (ref 4.3–11.1)

## 2021-04-27 PROCEDURE — 82962 GLUCOSE BLOOD TEST: CPT

## 2021-04-27 PROCEDURE — 85025 COMPLETE CBC W/AUTO DIFF WBC: CPT

## 2021-04-27 PROCEDURE — 74011250636 HC RX REV CODE- 250/636: Performed by: EMERGENCY MEDICINE

## 2021-04-27 PROCEDURE — 74011000258 HC RX REV CODE- 258: Performed by: EMERGENCY MEDICINE

## 2021-04-27 PROCEDURE — 96375 TX/PRO/DX INJ NEW DRUG ADDON: CPT

## 2021-04-27 PROCEDURE — 65270000029 HC RM PRIVATE

## 2021-04-27 PROCEDURE — 74018 RADEX ABDOMEN 1 VIEW: CPT

## 2021-04-27 PROCEDURE — 96365 THER/PROPH/DIAG IV INF INIT: CPT

## 2021-04-27 PROCEDURE — 83690 ASSAY OF LIPASE: CPT

## 2021-04-27 PROCEDURE — 74011000250 HC RX REV CODE- 250: Performed by: EMERGENCY MEDICINE

## 2021-04-27 PROCEDURE — 74011250637 HC RX REV CODE- 250/637: Performed by: FAMILY MEDICINE

## 2021-04-27 PROCEDURE — 80053 COMPREHEN METABOLIC PANEL: CPT

## 2021-04-27 PROCEDURE — 87040 BLOOD CULTURE FOR BACTERIA: CPT

## 2021-04-27 PROCEDURE — 83605 ASSAY OF LACTIC ACID: CPT

## 2021-04-27 PROCEDURE — 99282 EMERGENCY DEPT VISIT SF MDM: CPT

## 2021-04-27 RX ORDER — POLYETHYLENE GLYCOL 3350 17 G/17G
17 POWDER, FOR SOLUTION ORAL DAILY PRN
Status: DISCONTINUED | OUTPATIENT
Start: 2021-04-27 | End: 2021-05-08 | Stop reason: HOSPADM

## 2021-04-27 RX ORDER — DICYCLOMINE HYDROCHLORIDE 20 MG/1
20 TABLET ORAL
Status: DISCONTINUED | OUTPATIENT
Start: 2021-04-27 | End: 2021-04-27 | Stop reason: CLARIF

## 2021-04-27 RX ORDER — SODIUM CHLORIDE 0.9 % (FLUSH) 0.9 %
5-40 SYRINGE (ML) INJECTION AS NEEDED
Status: DISCONTINUED | OUTPATIENT
Start: 2021-04-27 | End: 2021-05-08 | Stop reason: HOSPADM

## 2021-04-27 RX ORDER — MORPHINE SULFATE 2 MG/ML
2 INJECTION, SOLUTION INTRAMUSCULAR; INTRAVENOUS
Status: DISPENSED | OUTPATIENT
Start: 2021-04-27 | End: 2021-04-28

## 2021-04-27 RX ORDER — ENOXAPARIN SODIUM 100 MG/ML
40 INJECTION SUBCUTANEOUS DAILY
Status: DISCONTINUED | OUTPATIENT
Start: 2021-04-28 | End: 2021-04-30

## 2021-04-27 RX ORDER — ACETAMINOPHEN 650 MG/1
650 SUPPOSITORY RECTAL
Status: DISCONTINUED | OUTPATIENT
Start: 2021-04-27 | End: 2021-05-08 | Stop reason: HOSPADM

## 2021-04-27 RX ORDER — DICYCLOMINE HYDROCHLORIDE 10 MG/1
20 CAPSULE ORAL
Status: DISCONTINUED | OUTPATIENT
Start: 2021-04-27 | End: 2021-05-01

## 2021-04-27 RX ORDER — PANTOPRAZOLE SODIUM 40 MG/1
40 TABLET, DELAYED RELEASE ORAL
Status: DISCONTINUED | OUTPATIENT
Start: 2021-04-28 | End: 2021-04-30

## 2021-04-27 RX ORDER — SODIUM CHLORIDE 0.9 % (FLUSH) 0.9 %
5-40 SYRINGE (ML) INJECTION EVERY 8 HOURS
Status: DISCONTINUED | OUTPATIENT
Start: 2021-04-27 | End: 2021-05-08 | Stop reason: HOSPADM

## 2021-04-27 RX ORDER — PROMETHAZINE HYDROCHLORIDE 25 MG/1
12.5 TABLET ORAL
Status: DISCONTINUED | OUTPATIENT
Start: 2021-04-27 | End: 2021-05-08 | Stop reason: HOSPADM

## 2021-04-27 RX ORDER — ROSUVASTATIN CALCIUM 20 MG/1
20 TABLET, COATED ORAL
Status: DISCONTINUED | OUTPATIENT
Start: 2021-04-27 | End: 2021-05-08 | Stop reason: HOSPADM

## 2021-04-27 RX ORDER — ACETAMINOPHEN 325 MG/1
650 TABLET ORAL
Status: DISCONTINUED | OUTPATIENT
Start: 2021-04-27 | End: 2021-05-08 | Stop reason: HOSPADM

## 2021-04-27 RX ORDER — METFORMIN HYDROCHLORIDE 500 MG/1
1000 TABLET ORAL 2 TIMES DAILY WITH MEALS
Status: DISCONTINUED | OUTPATIENT
Start: 2021-04-29 | End: 2021-05-08 | Stop reason: HOSPADM

## 2021-04-27 RX ORDER — ONDANSETRON 2 MG/ML
4 INJECTION INTRAMUSCULAR; INTRAVENOUS
Status: DISCONTINUED | OUTPATIENT
Start: 2021-04-27 | End: 2021-05-08 | Stop reason: HOSPADM

## 2021-04-27 RX ADMIN — ROSUVASTATIN 20 MG: 20 TABLET, FILM COATED ORAL at 22:00

## 2021-04-27 RX ADMIN — CEFTRIAXONE 1 G: 1 INJECTION, POWDER, FOR SOLUTION INTRAMUSCULAR; INTRAVENOUS at 18:55

## 2021-04-27 RX ADMIN — SODIUM CHLORIDE 1000 ML: 900 INJECTION, SOLUTION INTRAVENOUS at 18:52

## 2021-04-27 RX ADMIN — PROMETHAZINE HYDROCHLORIDE 12.5 MG: 25 INJECTION INTRAMUSCULAR; INTRAVENOUS at 18:53

## 2021-04-27 RX ADMIN — Medication 10 ML: at 22:00

## 2021-04-27 NOTE — H&P
[facility] Initial History and Physical Note    Patient: Jairon Coats Date: 4/27/2021  female, 68 y.o. Admit Date: 4/27/2021  Attending: Courtney Mandel MD     ASSESSMENT AND PLAN:     Principal Problem:    Acute gastroenteritis (4/27/2021)    Possible partial SBO per KUB tonight, CT abd/pelvis yesterday was not read as SBO. Consider repeat CT. NPO. IVF. IV pain/nausea control. Active Problems:    Leukocytosis (4/27/2021)    Follow CBC      UTI (urinary tract infection) (4/27/2021)    IV Rocephin. Urine/blood cultures pending      Essential hypertension (2/23/2016)    Stable. Continue home meds. Type 2 diabetes mellitus without complication, without long-term current use of insulin ([address] (11/29/2016)    Stable. Continue home meds. SSI      Mixed hyperlipidemia (11/29/2016)    Stable. Continue home meds. Gastroesophageal reflux disease without esophagitis (6/7/2017)    Stable. Continue home meds. DVT Prophylaxis: Lovenox      Code Status: FULL CODE      Disposition: Observe on med/donna for evaluation and treatment as per above.       Anticipated discharge: < 2 midnights     CHIEF COMPLAINT:  abdominal pain, nausea, vomiting    HISTORY OF PRESENT ILLNESS:      Patient Active Problem List   Diagnosis Code    Essential hypertension I10    Type 2 diabetes mellitus without complication, without long-term current use of insulin ([address] E11.9    Mixed hyperlipidemia E78.2    Osteopenia of multiple sites M85.89    Gastroesophageal reflux disease without esophagitis K21.9    Lumbar radiculopathy M54.16    DDD (degenerative disc disease), lumbar M51.36    Acute gastroenteritis K52.9    Leukocytosis D72.829    UTI (urinary tract infection) N39.0       Jairon Coats is a 68 y.o. female, with a history of  has a past medical history of DDD (degenerative disc disease), lumbar (2/19/2020), DM2 (diabetes mellitus, type 2) ([address] (3/4/2014), Gastroesophageal reflux disease without esophagitis (5/22/2015), HLD (hyperlipidemia), HTN (hypertension), Menopause, Sciatica of right side (2/19/2020), and UTI (urinary tract infection) (4/27/2021). She also has no past medical history of Asthma, Autoimmune disease ([address] CAD (coronary artery disease), Cancer ([address] Chronic kidney disease, COPD, Dementia, Heart failure ([address] Liver disease, Other ill-defined conditions(799.89), Psychiatric disorder, PUD (peptic ulcer disease), Seizures ([address] Sleep disorder, Stroke ([address] or Thromboembolus ([address] ,  has a past surgical history that includes hx hysterectomy; hx partial thyroidectomy; hx other surgical; hx colonoscopy (01/25/2010); and neurological procedure unlisted (2014). who presents to the ER with report of two days of persistent lower abdominal pain, nausea, and vomiting. She was seen in the ER yesterday and was discharged home with diagnosis of UTI after labs and CT abd/pelvis were unremarkable for admitting diagnosis. Pt reports persistent nausea and vomiting since that time and was unable to keep down Keflex that was prescribed for the UTI. Denies any fevers, chills, diarrhea. Allergy  Allergies   Allergen Reactions    Lortab [Hydrocodone-Acetaminophen] Itching     Pt states she has taken it since and been fine.  Acetaminophen Other (comments)     States she has taken this with no problem. Medication list  Prior to Admission Medications   Prescriptions Last Dose Informant Patient Reported? Taking? cephALEXin (Keflex) 500 mg capsule   No No   Sig: Take 1 Cap by mouth four (4) times daily for 7 days. dicyclomine (BENTYL) 20 mg tablet   No No   Sig: Take 1 Tab by mouth every six (6) hours as needed for Abdominal Cramps for up to 10 days. glucose blood VI test strips (ONETOUCH ULTRA TEST) strip   No No   Sig: Test 3 times daily   metFORMIN (GLUCOPHAGE) 1,000 mg tablet   No No   Sig: Take 1 Tab by mouth two (2) times daily (with meals).    omeprazole (PRILOSEC) 40 mg capsule   No No   Sig: TAKE ONE CAPSULE BY MOUTH TWICE A DAY WITH MEALS   ondansetron (ZOFRAN ODT) 4 mg disintegrating tablet   No No   Sig: Take 1 Tab by mouth every eight (8) hours as needed for Nausea. rosuvastatin (CRESTOR) 20 mg tablet   No No   Sig: Take 1 Tab by mouth nightly. Facility-Administered Medications: None       Past Medical History   Past Medical History:   Diagnosis Date    DDD (degenerative disc disease), lumbar 2/19/2020    DM2 (diabetes mellitus, type 2) ([facility] 3/4/2014    Gastroesophageal reflux disease without esophagitis 5/22/2015    HLD (hyperlipidemia)     HTN (hypertension)     Menopause     Sciatica of right side 2/19/2020    UTI (urinary tract infection) 4/27/2021       Past Surgical History:   Procedure Laterality Date    HX COLONOSCOPY  01/25/2010    HX HYSTERECTOMY      10 years ago    HX OTHER SURGICAL      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  HX PARTIAL THYROIDECTOMY      Lobectomy for benign mass.     NEUROLOGICAL PROCEDURE UNLISTED  2014    Brain Sx-[facility]       Social History   Social History     Socioeconomic History    Marital status:      Spouse name: Not on file    Number of children: Not on file    Years of education: Not on file    Highest education level: Not on file   Tobacco Use    Smoking status: Never Smoker    Smokeless tobacco: Never Used   Substance and Sexual Activity    Alcohol use: No    Drug use: No    Sexual activity: Yes     Partners: Male     Birth control/protection: Surgical   Other Topics Concern       Family History:   Family History   Problem Relation Age of Onset    Heart Disease Father 76    Kidney Disease Father         hepatitis, post-surgery (tranfusion)    Cancer Mother [de-identified]        Breast    Breast Cancer Mother 80    Alzheimer Sister [de-identified]    Arthritis-osteo Sister     Arthritis-osteo Brother         Back surgery    Diabetes Neg Hx        REVIEW OF SYSTEMS:   A 14 point review of systems was taken and pertinent positive as per HPI.    PHYSICAL EXAMINATION:  Vital 24 Hour Range Most Recent Value  Temperature Temp  Min: 99 °F (37.2 °C)  Max: 99 °F (37.2 °C) 99 °F (37.2 °C)    Pulse Pulse  Min: 81  Max: 81 81  Respiratory Resp  Min: 18  Max: 18 18  Blood Pressure BP  Min: 177/73  Max: 177/73 (!) 177/73  Pulse Oximetry SpO2  Min: 95 %  Max: 95 % 95 %  O2 No data recorded      Vital Most Recent Value First Value  Weight 60.8 kg (134 lb) Weight: 60.8 kg (134 lb)  Height 5' 4\" (162.6 cm) Height: 5' 4\" (162.6 cm)  BMI   N/A    Physical Exam:   General:     No acute distress, speaking in full sentences. Eyes:   No palpebral pallor or scleral icterus. ENT:   External auricular and nasal exam within normal limits. Cardiovascular: No cyanosis or edema of extremities. Respiratory:    No respiratory distress or accessory muscle use. Gastrointestinal:   Not actively vomiting, abdomen non-distended   Skin:      Normal color. No rashes, lesions, or jaundice. Neurologic:  CN II-XII grossly intact and symmetrical.      Moving all four extremities well with no focal deficits. Psychiatric:  Appropriate affect.  Alert       Intake/Output last 3 shifts:      Labs:  magnesium:7,phos:7)CMP:   Lab Results   Component Value Date/Time     04/27/2021 05:54 PM    K 3.8 04/27/2021 05:54 PM     04/27/2021 05:54 PM    CO2 25 04/27/2021 05:54 PM    AGAP 12 04/27/2021 05:54 PM     (H) 04/27/2021 05:54 PM    BUN 30 (H) 04/27/2021 05:54 PM    CREA 0.80 04/27/2021 05:54 PM    GFRAA >60 04/27/2021 05:54 PM    GFRNA >60 04/27/2021 05:54 PM    CA 9.2 04/27/2021 05:54 PM    ALB 3.7 04/27/2021 05:54 PM    TBILI 0.7 04/27/2021 05:54 PM    TP 7.0 04/27/2021 05:54 PM    GLOB 3.3 04/27/2021 05:54 PM    AGRAT 1.1 (L) 04/27/2021 05:54 PM    ALT 12 04/27/2021 05:54 PM         CBC:    Lab Results   Component Value Date/Time    WBC 16.0 (H) 04/27/2021 05:54 PM    HGB 14.6 04/27/2021 05:54 PM    HCT 42.0 04/27/2021 05:54 PM     04/27/2021 05:54 PM No results found for: INR, PTMR, PTP, PT1, PT2, INREXT    ABG:  No results found for: PH, PHI, PCO2, PCO2I, PO2, PO2I, HCO3, HCO3I, FIO2, FIO2I        Lab Results   Component Value Date/Time    Troponin-I <0.05 12/30/2010 05:07 AM       Imagining & Other Studies    XR Results (maximum last 3): [facility] from [facility] 05[facility]   XR SHOULDER RT 1V    Narrative EXAMINATION: SHOULDER RADIOGRAPH 5/29/2020 9:41 AM    ACCESSION NUMBER: [id number]    INDICATION: Postreduction    COMPARISON: Prior shoulder x-ray 5/29/2020    TECHNIQUE: 1 View of the right shoulder were obtained. FINDINGS:     Reduction of the previously seen right shoulder dislocation. The previously seen  bony fragment is adjacent to the greater tuberosity, suggestive of a small  greater tuberosity avulsion injury. The acromioclavicular joint is normally  aligned. The included portions of the right lung are clear. Impression IMPRESSION:    1. Reduction of right shoulder dislocation, with anatomic alignment the joint  within the limits of a single view. 2. Small likely avulsion fracture from the greater tuberosity. VOICE DICTATED BY: Dr. Corie Aguirre   XR SHOULDER RT AP/LAT MIN 2 V    Narrative EXAMINATION: XR SHOULDER RT AP/LAT MIN 2 V 5/29/2020 8:30 AM     COMPARISON: None available. INDICATION: fall, shoulder pain    TECHNIQUE: 3 views of the right shoulder were obtained    FINDINGS:    There is anteromedial dislocation of the glenohumeral joint. There is a subtle  osseous fragment adjacent to the greater tuberosity and glenoid, consistent with  a small fracture. Acromioclavicular joint osteophytes with normal alignment of  the acromioclavicular joint. No radiopaque foreign body. There is subtle  irregularity of the undersurface of the acromion on the scapular Y view, likely  degenerative. Impression IMPRESSION:    1. Anteromedial right shoulder dislocation.     2. Subtle linear bony fracture fragment adjacent to the greater tuberosity and  glenoid. It is uncertain whether this fractures of the inferior glenoid rim or  of the greater tuberosity. Results from Office Visit encounter on 09/18/17   XR SHOULDER RT AP/LAT MIN 2 V       CT Results (maximum last 3): Results from [place] encounter on 04/26/21   CT ABD PELV W CONT    Narrative CT of the Abdomen and Pelvis with contrast    CLINICAL INDICATION:  Acute moderate to severe low back pain, right and left  lower quadrant abdominal pain, nausea and vomiting. History of diabetes type 2,  GERD, high cholesterol, hypertension, hysterectomy. COMPARISON: Correlation with lumbar spine radiograph 4/12/2019, hip radiographs  6/13/2019, chest radiograph 12/30/2010    TECHNIQUE: Automated exposure Control was used. Multiple axial images were  obtained through the abdomen and pelvis after intravenous injection of 125cc of  isovue 370 IV contrast to further evaluate vessels and organs. Coronal  reformatted images were done for further evaluation of bones and organs. Oral  contrast was given for further evaluation of GI tract and adjacent organs. Patient respiratory motion artifact does limit detailed evaluation of all  organs. FINDINGS: Partially included lung bases are limited by motion, demonstrating no  obvious consolidation or effusion. Abdomen: There are no suspicious lesions in the liver, gallbladder, or spleen. The adrenal glands and pancreas demonstrate no acute abnormality. A 1.0 cm right  adrenal nodule is of unclear but doubtful significance. There is normal  enhancement of the kidneys, benign 6 cm right upper pole exophytic cyst, no  hydronephrosis. No lymphadenopathy. No free air, focal inflammatory changes or fluid collections  in the abdomen. Aorta normal caliber. Patent major vessels. Scattered areas of nonspecific mild wall thickening are noted involving small  and large bowel.  A few nonspecific small bowel loops in the right lower quadrant  measure upper normal limits in diameter. There is no overt bowel obstruction,  hernia, or appendicitis. Majority of GI tract is not opacified with oral  contrast, limiting detail. Pelvis:  No acute inflammatory changes or fluid collections in the pelvis. No  lymphadenopathy or mass. Uterus and ovaries are not seen, reportedly surgically  absent. Urinary bladder is unremarkable grossly. Bones: No acute osseous lesions. Impression 1. Nonspecific appearance of several small and large bowel loops, possibly  infection or inflammation. No high-grade obstruction. 2. No acute abnormality elsewhere. MRI Results (maximum last 3): No results found for this or any previous visit. Nuclear Medicine Results (maximum last 3): No results found for this or any previous visit. US Results (maximum last 3): No results found for this or any previous visit. DEXA Results (maximum last 3): Results from Hospital Encounter encounter on 05/31/18   DEXA BONE DENSITY STUDY AXIAL    Narrative DEXA bone density study, 5/31/2018. CLINICAL HISTORY: Postmenopausal female. Technique: Dual energy bone densitometry was performed using the [address] [employer] system. Comparison:  Dexa bone density study 4/25/2016. FINDINGS:    Average bone mineral density of the lumbar spine from L1 to L4 is 0.951 g/sq cm  which corresponds to a T-score of -2.0, and Z-score of -0.3. Prior T-score in  this area measured -1.9. Bone mineral density of the left femoral neck is 0.776 g/sq cm which corresponds  to a T-score of -1.9, and Z-score of 0.0. Prior T-score in this area measured  -2.0. Bone mineral density of the right femoral neck is 0.745 g/sq cm which  corresponds to a T-score of -2.1, and Z-score of -0.2. Prior T-score in this  area measured -2.1.    10 year fracture risk (FRAX score): Any Fracture:  14.6%  Hip fracture:  4.1%      Impression IMPRESSION:     1.  Minimal mixed changes in measured bone mineral densities as described above  which all continue to fall within the range for osteopenia. **    The [address] [place] [address] recommends that medical therapy be  considered in postmenopausal women and men age 48 years and older with a:       a. Hip or vertebral fracture,       b. T-score <= -2.5 in the spine or hip (osteoporotic), or       c.  T-score between -1.0 and -2.5 (low bone mass, osteopenic), and FRAX =>  3% for hip fracture or => 20% for major osteoporotic fracture. Results from Hospital Encounter encounter on 04/25/16   DEXA BONE DENSITY STUDY AXIAL    Narrative DEXA bone density study, 4/25/2016. CLINICAL HISTORY: Postmenopausal female. Technique: Dual energy bone densitometry was performed using the [address] [place] [address]  [employer] system. Comparison:  None. FINDINGS:    Average bone mineral density of the lumbar spine from L1 to L4 is 0.965 g/sq cm  which corresponds to a T-score of -1.9, and Z-score of -0.2. Bone mineral density of the left femoral neck is 0.755 g/sq cm which corresponds  to a T-score of -2.0, and Z-score of -0.2. Bone mineral density of the right femoral neck is 0.741 g/sq cm which  corresponds to a T-score of -2.1, and Z-score of -0.3.      10 year fracture risk (FRAX score): Any Fracture:  14%  Hip fracture:  3.6%      Impression IMPRESSION:     1. All measured bone mineral densities falling within the range for osteopenia. *    The National Osteoporosis Foundation recommends that medical therapy be  considered in postmenopausal women and men age 48 years and older with a:       a. Hip or vertebral fracture,       b. T-score <= -2.5 in the spine or hip (osteoporotic), or       c.  T-score between -1.0 and -2.5 (low bone mass, osteopenic), and FRAX =>  3% for hip fracture or => 20% for major osteoporotic fracture. HAN Results (maximum last 3):   Results from Hospital Encounter encounter on 10/08/19 [facility] MAMMO BI SCREENING INCL CAD    Narrative STUDY:  Bilateral digital screening mammogram      INDICATION:  Screening;  patient's mother had postmenopausal breast cancer in  her [de-identified]    COMPARISON:   5/31/2018, others back to 11/18/2014    FINDINGS: Bilateral digital screening mammography was performed, and is  interpreted in conjunction with a computer assisted detection (CAD) system. There are scattered fibroglandular densities. No suspicious masses, calcifications, or architectural distortion are  identified. There is no skin thickening or nipple retraction. Impression IMPRESSION:  No mammographic evidence of malignancy. Recommend annual mammogram in one year. A reminder letter will be scheduled. BI-RADS Assessment Category 1: Negative. BD2       Results from [place] encounter on 05/31/18   [facility] MAMMO BI SCREENING INCL CAD    Narrative BILATERAL DIGITAL SCREENING MAMMOGRAM      INDICATION:  Screening;  mother with breast cancer    TECHNIQUE:  Bilateral digital screening mammography was performed and  interpreted in conjunction with a computer assisted detection (CAD) system. COMPARISON:   4/25/2016 and 11/18/2014    FINDINGS: There are scattered fibroglandular densities. There are no suspicious masses, pleomorphic microcalcifications, or  architectural distortion. There is no skin thickening or nipple retraction. Impression IMPRESSION:  No mammographic evidence of malignancy. Recommend annual mammogram in one year. A reminder letter will be scheduled. BI-RADS Assessment Category 1: Negative.       BD2       Results from [place] encounter on 04/25/16   [facility] MAMMO BI SCREENING DIGTL    Narrative STUDY:  Bilateral digital screening mammogram      INDICATION:  Screening;  patient's mother had breast cancer in her [de-identified]    COMPARISON:   11/18/2014    FINDINGS: Bilateral digital screening mammography was performed, and is  interpreted in conjunction with a computer assisted detection (CAD) system. There are scattered fibroglandular densities. No suspicious masses, calcifications, or architectural distortion are  identified. There is no skin thickening or nipple retraction. Impression IMPRESSION:  No mammographic evidence of malignancy. Recommend annual mammogram in one year. A reminder letter will be scheduled. BI-RADS Assessment Category 1: Negative. BD2         IR Results (maximum last 3): No results found for this or any previous visit. VAS/US Results (maximum last 3): No results found for this or any previous visit. PET Results (maximum last 3): No results found for this or any previous visit.       EKG Results     None          Verito Troy MD  4/27/2021 7:23 PM

## 2021-04-27 NOTE — ED NOTES
Pt was seen and treated in the ED on Monday. Returns to the ED for c/o increased discomfort and fever.  WBC's elevated from yesterday

## 2021-04-27 NOTE — ED PROVIDER NOTES
68-year-old  female presents emergency department complaining of lower abdominal pain starting yesterday morning. Was seen in the emergency department yesterday, had multiple labs as well as CT of the abdomen pelvis, discharged with diagnosis of UTI on Keflex. Patient states overnight she had multiple episodes of nausea and vomiting despite Zofran ODT, and complains of worsening abdominal pain and nausea. Patient states she was unable to keep down most of the Keflex. She denies any melena, hematochezia, or diarrhea. Only prior abdominal surgery was a hysterectomy. The history is provided by the patient and a relative. Vomiting   This is a new problem. The current episode started yesterday. The problem occurs 5 to 10 times per day. The problem has been gradually worsening. The emesis has an appearance of stomach contents and clear. There has been no fever. Associated symptoms include abdominal pain. Pertinent negatives include no chills, no fever, no sweats, no diarrhea, no headaches, no arthralgias, no myalgias, no cough, no URI and no headaches. Her pertinent negatives include no irritable bowel syndrome, no inflammatory bowel disease, no short gut syndrome, no bowel resection, no recent abdominal surgery, no malabsorption, no gastric bypass and no DM. 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  HX PARTIAL THYROIDECTOMY      Lobectomy for benign mass.     NEUROLOGICAL PROCEDURE UNLISTED  2014    [place] [place] [place]         Family History: Problem Relation Age of Onset    Heart Disease Father 76    Kidney Disease Father         hepatitis, post-surgery (tranfusion)    Cancer Mother [de-identified]        Breast    Breast Cancer Mother 80    Alzheimer Sister [de-identified]    Arthritis-osteo Sister     Arthritis-osteo Brother         Back surgery    Diabetes Neg Hx        Social History     Socioeconomic History    Marital status:      Spouse name: Not on file    Number of children: Not on file    Years of education: Not on file    Highest education level: Not on file   Occupational History    Not on file   Social Needs    Financial resource strain: Not on file    Food insecurity     Worry: Not on file     Inability: Not on file    Transportation needs     Medical: Not on file     Non-medical: Not on file   Tobacco Use    Smoking status: Never Smoker    Smokeless tobacco: Never Used   Substance and Sexual Activity    Alcohol use: No    Drug use: No    Sexual activity: Yes     Partners: Male     Birth control/protection: Surgical   Lifestyle    Physical activity     Days per week: Not on file     Minutes per session: Not on file    Stress: Not on file   Relationships    Social connections     Talks on phone: Not on file     Gets together: Not on file     Attends Congregation service: Not on file     Active member of club or organization: Not on file     Attends meetings of clubs or organizations: Not on file     Relationship status: Not on file    Intimate partner violence     Fear of current or ex partner: Not on file     Emotionally abused: Not on file     Physically abused: Not on file     Forced sexual activity: Not on file   Other Topics Concern     Service Not Asked    Blood Transfusions Not Asked    Caffeine Concern Not Asked    Occupational Exposure Not Asked    Hobby Hazards Not Asked    Sleep Concern Not Asked    Stress Concern Not Asked    Weight Concern Not Asked    Special Diet Not Asked    Back Care Not Asked    Exercise Not Asked    Bike Helmet Not Asked   [address] [facility] Not Asked    Self-Exams Not Asked   Social History Narrative    Not on file         ALLERGIES: Lortab [hydrocodone-acetaminophen] and Acetaminophen    Review of Systems   Constitutional: Positive for activity change, appetite change and fatigue. Negative for chills and fever. Respiratory: Negative for cough. Gastrointestinal: Positive for abdominal pain, nausea and vomiting. Negative for abdominal distention, blood in stool, constipation and diarrhea. Genitourinary: Negative for dysuria and frequency. Musculoskeletal: Negative for arthralgias and myalgias. Neurological: Negative for headaches. All other systems reviewed and are negative. Vitals:    04/27/21 1720   BP: (!) 177/73   Pulse: 81   Resp: 18   Temp: 99 °F (37.2 °C)   SpO2: 95%   Weight: 60.8 kg (134 lb)   Height: 5' 4\" (1.626 m)            Physical Exam  Vitals signs and nursing note reviewed. Constitutional:       General: She is in acute distress. Appearance: She is well-developed. HENT:      Head: Normocephalic and atraumatic. Right Ear: External ear normal.      Left Ear: External ear normal.   Eyes:      Extraocular Movements: Extraocular movements intact. Conjunctiva/sclera: Conjunctivae normal.      Pupils: Pupils are equal, round, and reactive to light. Neck:      Musculoskeletal: Normal range of motion and neck supple. Cardiovascular:      Rate and Rhythm: Normal rate and regular rhythm. Heart sounds: Normal heart sounds. Pulmonary:      Effort: Pulmonary effort is normal.      Breath sounds: Normal breath sounds. Abdominal:      General: Bowel sounds are normal. There is no distension. Palpations: Abdomen is soft. There is no shifting dullness, hepatomegaly, splenomegaly, mass or pulsatile mass. Tenderness: There is abdominal tenderness in the right lower quadrant, periumbilical area, suprapubic area and left lower quadrant. There is no right CVA tenderness, left CVA tenderness, guarding or rebound. Negative signs include Sawyer's sign and McBurney's sign. Hernia: No hernia is present. Musculoskeletal: Normal range of motion. Skin:     General: Skin is warm and dry. Capillary Refill: Capillary refill takes less than 2 seconds. Neurological:      Mental Status: She is alert and oriented to person, place, and time. Cranial Nerves: Cranial nerve deficit (Positive left facial droop, patient is status post left acoustic neuroma removal) present. Motor: Motor function is intact. Psychiatric:         Mood and Affect: Mood normal.         Speech: Speech normal.         Behavior: Behavior normal.         Cognition and Memory: Cognition and memory normal.          MDM  Number of Diagnoses or Management Options     Amount and/or Complexity of Data Reviewed  Clinical lab tests: ordered and reviewed  Tests in the radiology section of CPT®: reviewed  Review and summarize past medical records: yes  Discuss the patient with other providers: yes    Risk of Complications, Morbidity, and/or Mortality  Presenting problems: high  Diagnostic procedures: minimal  Management options: moderate    Patient Progress  Patient progress: improved         Procedures    The patient was observed in the ED. patient was hydrated with normal saline, given Rocephin for suspected intra-abdominal infection, and case was discussed with hospitalist after failure of outpatient therapy.     Results Reviewed:      Recent Results (from the past 24 hour(s))   CBC WITH AUTOMATED DIFF    Collection Time: 04/27/21  5:54 PM   Result Value Ref Range    WBC 16.0 (H) 4.3 - 11.1 K/uL    RBC 4.38 4.05 - 5.2 M/uL    HGB 14.6 11.7 - 15.4 g/dL    HCT 42.0 35.8 - 46.3 %    MCV 95.9 79.6 - 97.8 FL    MCH 33.3 (H) 26.1 - 32.9 PG    MCHC 34.8 31.4 - 35.0 g/dL    RDW 12.3 11.9 - 14.6 %    PLATELET 891 374 - 969 K/uL    MPV 10.5 9.4 - 12.3 FL    ABSOLUTE NRBC 0.00 0.0 - 0.2 K/uL    DF AUTOMATED      NEUTROPHILS 83 (H) 43 - 78 %    LYMPHOCYTES 7 (L) 13 - 44 %    MONOCYTES 9 4.0 - 12.0 %    EOSINOPHILS 0 (L) 0.5 - 7.8 %    BASOPHILS 1 0.0 - 2.0 %    IMMATURE GRANULOCYTES 0 0.0 - 5.0 %    ABS. NEUTROPHILS 13.3 (H) 1.7 - 8.2 K/UL    ABS. LYMPHOCYTES 1.1 0.5 - 4.6 K/UL    ABS. MONOCYTES 1.4 (H) 0.1 - 1.3 K/UL    ABS. EOSINOPHILS 0.0 0.0 - 0.8 K/UL    ABS. BASOPHILS 0.1 0.0 - 0.2 K/UL    ABS. IMM. GRANS. 0.0 0.0 - 0.5 K/UL   METABOLIC PANEL, COMPREHENSIVE    Collection Time: 04/27/21  5:54 PM   Result Value Ref Range    Sodium 137 136 - 145 mmol/L    Potassium 3.8 3.5 - 5.1 mmol/L    Chloride 100 98 - 107 mmol/L    CO2 25 21 - 32 mmol/L    Anion gap 12 7 - 16 mmol/L    Glucose 168 (H) 65 - 100 mg/dL    BUN 30 (H) 8 - 23 MG/DL    Creatinine 0.80 0.6 - 1.0 MG/DL    GFR est AA >60 >60 ml/min/1.73m2    GFR est non-AA >60 >60 ml/min/1.73m2    Calcium 9.2 8.3 - 10.4 MG/DL    Bilirubin, total 0.7 0.2 - 1.1 MG/DL    ALT (SGPT) 12 12 - 65 U/L    AST (SGOT) 14 (L) 15 - 37 U/L    Alk.  phosphatase 60 50 - 130 U/L    Protein, total 7.0 6.3 - 8.2 g/dL    Albumin 3.7 3.2 - 4.6 g/dL    Globulin 3.3 2.3 - 3.5 g/dL    A-G Ratio 1.1 (L) 1.2 - 3.5     LIPASE    Collection Time: 04/27/21  5:54 PM   Result Value Ref Range    Lipase 48 (L) 73 - 393 U/L   LACTIC ACID    Collection Time: 04/27/21  5:54 PM   Result Value Ref Range    Lactic acid 2.1 (H) 0.4 - 2.0 MMOL/L   LACTIC ACID    Collection Time: 04/27/21  8:36 PM   Result Value Ref Range    Lactic acid 1.7 0.4 - 2.0 MMOL/L   GLUCOSE, POC    Collection Time: 04/27/21  9:21 PM   Result Value Ref Range    Glucose (POC) 138 (H) 65 - 100 mg/dL    Performed by [facility]

## 2021-04-28 ENCOUNTER — APPOINTMENT (OUTPATIENT)
Dept: CT IMAGING | Age: 81
DRG: 389 | End: 2021-04-28
Attending: HOSPITALIST
Payer: MEDICARE

## 2021-04-28 LAB
ALBUMIN SERPL-MCNC: 3.3 G/DL (ref 3.2–4.6)
ALBUMIN/GLOB SERPL: 1.2 {RATIO} (ref 1.2–3.5)
ALP SERPL-CCNC: 48 U/L (ref 50–136)
ALT SERPL-CCNC: 10 U/L (ref 12–65)
ANION GAP SERPL CALC-SCNC: 9 MMOL/L (ref 7–16)
AST SERPL-CCNC: 13 U/L (ref 15–37)
BASOPHILS # BLD: 0.1 K/UL (ref 0–0.2)
BASOPHILS NFR BLD: 1 % (ref 0–2)
BILIRUB SERPL-MCNC: 0.6 MG/DL (ref 0.2–1.1)
BUN SERPL-MCNC: 31 MG/DL (ref 8–23)
CALCIUM SERPL-MCNC: 8.9 MG/DL (ref 8.3–10.4)
CHLORIDE SERPL-SCNC: 103 MMOL/L (ref 98–107)
CO2 SERPL-SCNC: 26 MMOL/L (ref 21–32)
CREAT SERPL-MCNC: 0.58 MG/DL (ref 0.6–1)
DIFFERENTIAL METHOD BLD: ABNORMAL
EOSINOPHIL # BLD: 0 K/UL (ref 0–0.8)
EOSINOPHIL NFR BLD: 0 % (ref 0.5–7.8)
ERYTHROCYTE [DISTWIDTH] IN BLOOD BY AUTOMATED COUNT: 12.3 % (ref 11.9–14.6)
GLOBULIN SER CALC-MCNC: 2.7 G/DL (ref 2.3–3.5)
GLUCOSE BLD STRIP.AUTO-MCNC: 115 MG/DL (ref 65–100)
GLUCOSE BLD STRIP.AUTO-MCNC: 122 MG/DL (ref 65–100)
GLUCOSE BLD STRIP.AUTO-MCNC: 129 MG/DL (ref 65–100)
GLUCOSE BLD STRIP.AUTO-MCNC: 145 MG/DL (ref 65–100)
GLUCOSE SERPL-MCNC: 150 MG/DL (ref 65–100)
HCT VFR BLD AUTO: 37.2 % (ref 35.8–46.3)
HGB BLD-MCNC: 12.8 G/DL (ref 11.7–15.4)
IMM GRANULOCYTES # BLD AUTO: 0 K/UL (ref 0–0.5)
IMM GRANULOCYTES NFR BLD AUTO: 0 % (ref 0–5)
LYMPHOCYTES # BLD: 0.8 K/UL (ref 0.5–4.6)
LYMPHOCYTES NFR BLD: 8 % (ref 13–44)
MCH RBC QN AUTO: 32.9 PG (ref 26.1–32.9)
MCHC RBC AUTO-ENTMCNC: 34.4 G/DL (ref 31.4–35)
MCV RBC AUTO: 95.6 FL (ref 79.6–97.8)
MONOCYTES # BLD: 1.3 K/UL (ref 0.1–1.3)
MONOCYTES NFR BLD: 13 % (ref 4–12)
NEUTS SEG # BLD: 8.2 K/UL (ref 1.7–8.2)
NEUTS SEG NFR BLD: 79 % (ref 43–78)
NRBC # BLD: 0 K/UL (ref 0–0.2)
PLATELET # BLD AUTO: 234 K/UL (ref 150–450)
PMV BLD AUTO: 10 FL (ref 9.4–12.3)
POTASSIUM SERPL-SCNC: 3.6 MMOL/L (ref 3.5–5.1)
PROT SERPL-MCNC: 6 G/DL (ref 6.3–8.2)
RBC # BLD AUTO: 3.89 M/UL (ref 4.05–5.2)
SERVICE CMNT-IMP: ABNORMAL
SODIUM SERPL-SCNC: 138 MMOL/L (ref 136–145)
WBC # BLD AUTO: 10.4 K/UL (ref 4.3–11.1)

## 2021-04-28 PROCEDURE — 74011250636 HC RX REV CODE- 250/636: Performed by: FAMILY MEDICINE

## 2021-04-28 PROCEDURE — 74011250636 HC RX REV CODE- 250/636: Performed by: HOSPITALIST

## 2021-04-28 PROCEDURE — 80053 COMPREHEN METABOLIC PANEL: CPT

## 2021-04-28 PROCEDURE — 85025 COMPLETE CBC W/AUTO DIFF WBC: CPT

## 2021-04-28 PROCEDURE — 74011000258 HC RX REV CODE- 258: Performed by: FAMILY MEDICINE

## 2021-04-28 PROCEDURE — 74011250637 HC RX REV CODE- 250/637: Performed by: FAMILY MEDICINE

## 2021-04-28 PROCEDURE — 74011000636 HC RX REV CODE- 636: Performed by: HOSPITALIST

## 2021-04-28 PROCEDURE — 2709999900 HC NON-CHARGEABLE SUPPLY

## 2021-04-28 PROCEDURE — 65270000029 HC RM PRIVATE

## 2021-04-28 PROCEDURE — 36415 COLL VENOUS BLD VENIPUNCTURE: CPT

## 2021-04-28 PROCEDURE — 74011000258 HC RX REV CODE- 258: Performed by: HOSPITALIST

## 2021-04-28 PROCEDURE — 74177 CT ABD & PELVIS W/CONTRAST: CPT

## 2021-04-28 PROCEDURE — 82962 GLUCOSE BLOOD TEST: CPT

## 2021-04-28 RX ORDER — POTASSIUM CHLORIDE AND SODIUM CHLORIDE 450; 150 MG/100ML; MG/100ML
INJECTION, SOLUTION INTRAVENOUS CONTINUOUS
Status: DISCONTINUED | OUTPATIENT
Start: 2021-04-28 | End: 2021-04-30

## 2021-04-28 RX ORDER — DIPHENHYDRAMINE HYDROCHLORIDE 50 MG/ML
12.5 INJECTION, SOLUTION INTRAMUSCULAR; INTRAVENOUS
Status: DISCONTINUED | OUTPATIENT
Start: 2021-04-28 | End: 2021-05-08 | Stop reason: HOSPADM

## 2021-04-28 RX ORDER — HYDRALAZINE HYDROCHLORIDE 20 MG/ML
10 INJECTION INTRAMUSCULAR; INTRAVENOUS
Status: DISCONTINUED | OUTPATIENT
Start: 2021-04-28 | End: 2021-05-01

## 2021-04-28 RX ORDER — SODIUM CHLORIDE 0.9 % (FLUSH) 0.9 %
10 SYRINGE (ML) INJECTION
Status: COMPLETED | OUTPATIENT
Start: 2021-04-28 | End: 2021-04-28

## 2021-04-28 RX ADMIN — IOPAMIDOL 100 ML: 755 INJECTION, SOLUTION INTRAVENOUS at 19:51

## 2021-04-28 RX ADMIN — Medication 10 ML: at 15:37

## 2021-04-28 RX ADMIN — Medication 10 ML: at 19:51

## 2021-04-28 RX ADMIN — ROSUVASTATIN 20 MG: 20 TABLET, FILM COATED ORAL at 21:44

## 2021-04-28 RX ADMIN — ENOXAPARIN SODIUM 40 MG: 40 INJECTION SUBCUTANEOUS at 08:03

## 2021-04-28 RX ADMIN — SODIUM CHLORIDE 100 ML: 900 INJECTION, SOLUTION INTRAVENOUS at 19:51

## 2021-04-28 RX ADMIN — SODIUM CHLORIDE 500 ML: 900 INJECTION, SOLUTION INTRAVENOUS at 05:00

## 2021-04-28 RX ADMIN — Medication 10 ML: at 22:00

## 2021-04-28 RX ADMIN — Medication 10 ML: at 06:00

## 2021-04-28 RX ADMIN — ONDANSETRON 4 MG: 2 INJECTION INTRAMUSCULAR; INTRAVENOUS at 22:37

## 2021-04-28 RX ADMIN — DIATRIZOATE MEGLUMINE AND DIATRIZOATE SODIUM 15 ML: 660; 100 LIQUID ORAL; RECTAL at 16:29

## 2021-04-28 RX ADMIN — POTASSIUM CHLORIDE AND SODIUM CHLORIDE: 450; 150 INJECTION, SOLUTION INTRAVENOUS at 16:20

## 2021-04-28 RX ADMIN — HYDRALAZINE HYDROCHLORIDE 10 MG: 20 INJECTION INTRAMUSCULAR; INTRAVENOUS at 22:37

## 2021-04-28 RX ADMIN — DIPHENHYDRAMINE HYDROCHLORIDE 12.5 MG: 50 INJECTION, SOLUTION INTRAMUSCULAR; INTRAVENOUS at 22:37

## 2021-04-28 RX ADMIN — ONDANSETRON 4 MG: 2 INJECTION INTRAMUSCULAR; INTRAVENOUS at 17:17

## 2021-04-28 RX ADMIN — CEFTRIAXONE 1 G: 1 INJECTION, POWDER, FOR SOLUTION INTRAMUSCULAR; INTRAVENOUS at 08:03

## 2021-04-28 RX ADMIN — PANTOPRAZOLE SODIUM 40 MG: 40 TABLET, DELAYED RELEASE ORAL at 16:21

## 2021-04-28 NOTE — PROGRESS NOTES
Problem: Falls - Risk of  Goal: *Absence of Falls  Description: Document Darcie Saini Fall Risk and appropriate interventions in the flowsheet.   Outcome: Progressing Towards Goal  Note: Fall Risk Interventions:  Mobility Interventions: Bed/chair exit alarm

## 2021-04-28 NOTE — PROGRESS NOTES
65 : notified dr varner pt was not having much intake and not currently on continuous fluids. Orders for 500ml NS bolus x1 ordered. 0645: Clarified NPO order placed by Dr Ramirez Plan , pt is to be NPO starting now.

## 2021-04-28 NOTE — PROGRESS NOTES
04/27/21 2103   Dual Skin Pressure Injury Assessment   Dual Skin Pressure Injury Assessment WDL   Second Care Provider (Based on [address] [place] [address]) hansel RN   Skin Integumentary   Skin Integumentary (WDL) WDL    Pressure  Injury Documentation No Pressure Injury Noted-Pressure Ulcer Prevention Initiated

## 2021-04-28 NOTE — PROGRESS NOTES
1815-END OF SHIFT NOTE:    -pt rested well throughout the shift  -CT scan pending  -vss, no needs voiced at this time     Intake/Output  04/28 0701 - 04/28 1900  In: 261 [P.O.:250; I.V.:256]  Out: 300 [Urine:300] (not accurate as pt missed hat for measurement)   Voiding: YES  Catheter: NO  Drain:        Stool:  0 occurrences. Emesis:  1 occurrences. VITAL SIGNS  Patient Vitals for the past 12 hrs:   Temp Pulse Resp BP SpO2   04/28/21 1551 98.5 °F (36.9 °C) 77 20 (!) 165/68 93 %   04/28/21 1212 99 °F (37.2 °C) 79 16 (!) 170/81 93 %   04/28/21 0754 98.3 °F (36.8 °C) 77 18 (!) 174/72 95 %       Pain Assessment  Pain 1  Pain Scale 1: Numeric (0 - 10) (04/28/21 0810)  Pain Intensity 1: 0 (04/28/21 0810)  Patient Stated Pain Goal: 0 (04/28/21 0810)  Pain Reassessment 1: Yes (04/27/21 2103)  Pain Onset 1: ongoing (04/27/21 1720)  Pain Location 1: Abdomen (04/27/21 1720)  Pain Orientation 1: Lower (04/27/21 1720)  Pain Description 1: Dull (04/27/21 1720)    Ambulating  Yes    Additional Information:     Shift report given to oncoming nurse at the bedside.     Terence Rodgers, RN

## 2021-04-28 NOTE — PROGRESS NOTES
[facility] Hospitalist Service Progress Note    INTERVAL HISTORY  / Subjective:  She is no longer nauseous,    Assessment / Plan:    Distended loops of bowel, possible small bowel obstruction  Former surgical history with prior abdominal hysterectomy  04/28 she is no longer vomiting, she her last bowel movement was Monday, she is able to belch and pass gas though not as much as usual, request repeat CT abdomen pelvis with contrast, continue IV hydration with one half NS with potassium additive, abdominal exam is soft, there is tenderness to deep palpation, bowel sounds are hyperactive, I will I will clear liquid diet for now since she is no longer nausea and vomiting, surgery consut if needed t pending CT abdomen pelvis results    Acute UTI  Urinalysis showed 4+ bacteria, reflex culture pending, she is already been started on IV ceftriaxone, she has no definitive symptoms that she reports of UTI    Diabetes mellitus type 2  Hemoglobin A1c in control at 6.4  Hold Metformin  Continue with IV hydration    Objective:  Visit Vitals  BP (!) 170/81 (BP 1 Location: Right upper arm, BP Patient Position: At rest;Lying right side)   Pulse 79   Temp 99 °F (37.2 °C)   Resp 16   Ht 5' 4\" (1.626 m)   Wt 60.8 kg (134 lb)   SpO2 93%   BMI 23.00 kg/m²                 Physical Exam:  General: Mildly ill-appearing owing to abdominal pain  HEENT: Pupils equal and reactive to light and accommodation, oropharynx is clear   Neck: Supple, no lymphadenopathy, no JVD   Lungs: Clear to auscultation bilaterally   Cardiovascular: Regular rate and rhythm with normal S1 and S2   Abdomen: Soft, but tender to deep palpation particularly in the left upper quadrant, bowel sounds are hyperactive   extremities: No cyanosis clubbing or edema   Neuro: Nonfocal, A&O x3   Psych: Normal affect     Intake and Output:  Date 04/27/21 0700 - 04/28/21 0659 04/28/21 0700 - 04/29/21 0659   Shift 4880-4280 5562-1936 24 Hour Total 3765-1851 5993-1732 24 Hour Total INTAKE   P.O.  60 60        P. O.  60 60      Shift Total(mL/kg)  60(1) 60(1)      OUTPUT   Urine(mL/kg/hr)           Urine Occurrence(s)  2 x 2 x 1 x  1 x   Shift Total(mL/kg)         NET  60 60      Weight (kg) 60.8 60.8 60.8 60.8 60.8 60.8       LAB:  Admission on 04/27/2021   Component Date Value    WBC 04/27/2021 16.0*    RBC 04/27/2021 4.38     HGB 04/27/2021 14.6     HCT 04/27/2021 42.0     MCV 04/27/2021 95.9     MCH 04/27/2021 33.3*    MCHC 04/27/2021 34.8     RDW 04/27/2021 12.3     PLATELET 40/28/3234 556     MPV 04/27/2021 10.5     ABSOLUTE NRBC 04/27/2021 0.00     DF 04/27/2021 AUTOMATED     NEUTROPHILS 04/27/2021 83*    LYMPHOCYTES 04/27/2021 7*    MONOCYTES 04/27/2021 9     EOSINOPHILS 04/27/2021 0*    BASOPHILS 04/27/2021 1     IMMATURE GRANULOCYTES 04/27/2021 0     ABS. NEUTROPHILS 04/27/2021 13.3*    ABS. LYMPHOCYTES 04/27/2021 1.1     ABS. MONOCYTES 04/27/2021 1.4*    ABS. EOSINOPHILS 04/27/2021 0.0     ABS. BASOPHILS 04/27/2021 0.1     ABS. IMM. GRANS. 04/27/2021 0.0     Sodium 04/27/2021 137     Potassium 04/27/2021 3.8     Chloride 04/27/2021 100     CO2 04/27/2021 25     Anion gap 04/27/2021 12     Glucose 04/27/2021 168*    BUN 04/27/2021 30*    Creatinine 04/27/2021 0.80     GFR est AA 04/27/2021 >60     GFR est non-AA 04/27/2021 >60     Calcium 04/27/2021 9.2     Bilirubin, total 04/27/2021 0.7     ALT (SGPT) 04/27/2021 12     AST (SGOT) 04/27/2021 14*    Alk.  phosphatase 04/27/2021 60     Protein, total 04/27/2021 7.0     Albumin 04/27/2021 3.7     Globulin 04/27/2021 3.3     A-G Ratio 04/27/2021 1.1*    Lipase 04/27/2021 48*    Lactic acid 04/27/2021 2.1*    Special Requests: 04/27/2021                      Value:NO SPECIAL REQUESTS  LEFT  HAND      Culture result: 04/27/2021 NO GROWTH AFTER 16 HOURS     Special Requests: 04/27/2021                      Value:RIGHT  Antecubital      Culture result: 04/27/2021 NO GROWTH AFTER 14 HOURS     Lactic acid 04/27/2021 1.7     Glucose (POC) 04/27/2021 138*    Performed by 04/27/2021 [facility]     Sodium 04/28/2021 138     Potassium 04/28/2021 3.6     Chloride 04/28/2021 103     CO2 04/28/2021 26     Anion gap 04/28/2021 9     Glucose 04/28/2021 150*    BUN 04/28/2021 31*    Creatinine 04/28/2021 0.58*    GFR est AA 04/28/2021 >60     GFR est non-AA 04/28/2021 >60     Calcium 04/28/2021 8.9     Bilirubin, total 04/28/2021 0.6     ALT (SGPT) 04/28/2021 10*    AST (SGOT) 04/28/2021 13*    Alk. phosphatase 04/28/2021 48*    Protein, total 04/28/2021 6.0*    Albumin 04/28/2021 3.3     Globulin 04/28/2021 2.7     A-G Ratio 04/28/2021 1.2     WBC 04/28/2021 10.4     RBC 04/28/2021 3.89*    HGB 04/28/2021 12.8     HCT 04/28/2021 37.2     MCV 04/28/2021 95.6     MCH 04/28/2021 32.9     MCHC 04/28/2021 34.4     RDW 04/28/2021 12.3     PLATELET 76/55/3600 548     MPV 04/28/2021 10.0     ABSOLUTE NRBC 04/28/2021 0.00     DF 04/28/2021 AUTOMATED     NEUTROPHILS 04/28/2021 79*    LYMPHOCYTES 04/28/2021 8*    MONOCYTES 04/28/2021 13*    EOSINOPHILS 04/28/2021 0*    BASOPHILS 04/28/2021 1     IMMATURE GRANULOCYTES 04/28/2021 0     ABS. NEUTROPHILS 04/28/2021 8.2     ABS. LYMPHOCYTES 04/28/2021 0.8     ABS. MONOCYTES 04/28/2021 1.3     ABS. EOSINOPHILS 04/28/2021 0.0     ABS. BASOPHILS 04/28/2021 0.1     ABS. IMM. GRANS. 04/28/2021 0.0     Glucose (POC) 04/28/2021 145*    Performed by 04/28/2021 [facility]in[facility]     Glucose (POC) 04/28/2021 129*    Performed by 04/28/2021 [facility]in[facility]CT        IMAGING:  Xr Abd (kub)    Result Date: 4/27/2021  Mildly dilated small bowel loops concerning for a partial small bowel obstruction. Ct Abd Pelv W Cont    Result Date: 4/26/2021  1. Nonspecific appearance of several small and large bowel loops, possibly infection or inflammation. No high-grade obstruction. 2. No acute abnormality elsewhere.        EKG:  No results found for this or any previous visit.           Elizabeth King MD  4/28/2021 3:34 PM

## 2021-04-28 NOTE — PROGRESS NOTES
Spiritual Care initial visit made by [employer]. Card left. JOSUÉ Montes De Oca. Div  / Bereavement Coordinator

## 2021-04-28 NOTE — ROUTINE PROCESS
Report called to ELIZABETH Benavides on 3rd floor. Daughter at the bedside and update given. # 20 jelco via the left upper forearm and #22 jelco via the left lower forearm intact without swelling or redness.  Transported to  366 via the stretcher

## 2021-04-28 NOTE — PROGRESS NOTES
Care Management Interventions  PCP Verified by CM: Yes  Mode of Transport at Discharge: Self  Discharge Durable Medical Equipment: No  Current Support Network: Lives with Spouse  Confirm Follow Up Transport: Family  Discharge Location  Discharge Placement: Home with family assistance  Discharge planning assessment complete. Patient lives at home with her spouse, Singhion Aschoff is at bedside. Patient does not use any DME for assistance at home. Currently no discharge needs are noted. CM will continue to follow.

## 2021-04-28 NOTE — PROGRESS NOTES
2150- notified provider of pts increased bp, no new orders at this time.      [phone]- notified Laci Arenas in [address] [employer] [place] department that pt just finished her contrast.

## 2021-04-29 LAB
ALBUMIN SERPL-MCNC: 2.9 G/DL (ref 3.2–4.6)
ALBUMIN/GLOB SERPL: 1.2 {RATIO} (ref 1.2–3.5)
ALP SERPL-CCNC: 42 U/L (ref 50–136)
ALT SERPL-CCNC: <6 U/L (ref 12–65)
ANION GAP SERPL CALC-SCNC: 7 MMOL/L (ref 7–16)
AST SERPL-CCNC: 9 U/L (ref 15–37)
BASOPHILS # BLD: 0 K/UL (ref 0–0.2)
BASOPHILS NFR BLD: 0 % (ref 0–2)
BILIRUB SERPL-MCNC: 0.4 MG/DL (ref 0.2–1.1)
BUN SERPL-MCNC: 25 MG/DL (ref 8–23)
CALCIUM SERPL-MCNC: 8.5 MG/DL (ref 8.3–10.4)
CHLORIDE SERPL-SCNC: 104 MMOL/L (ref 98–107)
CO2 SERPL-SCNC: 26 MMOL/L (ref 21–32)
CREAT SERPL-MCNC: 0.46 MG/DL (ref 0.6–1)
DIFFERENTIAL METHOD BLD: ABNORMAL
EOSINOPHIL # BLD: 0.1 K/UL (ref 0–0.8)
EOSINOPHIL NFR BLD: 1 % (ref 0.5–7.8)
ERYTHROCYTE [DISTWIDTH] IN BLOOD BY AUTOMATED COUNT: 12.2 % (ref 11.9–14.6)
GLOBULIN SER CALC-MCNC: 2.4 G/DL (ref 2.3–3.5)
GLUCOSE BLD STRIP.AUTO-MCNC: 105 MG/DL (ref 65–100)
GLUCOSE BLD STRIP.AUTO-MCNC: 136 MG/DL (ref 65–100)
GLUCOSE BLD STRIP.AUTO-MCNC: 136 MG/DL (ref 65–100)
GLUCOSE BLD STRIP.AUTO-MCNC: 153 MG/DL (ref 65–100)
GLUCOSE SERPL-MCNC: 119 MG/DL (ref 65–100)
HCT VFR BLD AUTO: 34.6 % (ref 35.8–46.3)
HGB BLD-MCNC: 12.3 G/DL (ref 11.7–15.4)
IMM GRANULOCYTES # BLD AUTO: 0 K/UL (ref 0–0.5)
IMM GRANULOCYTES NFR BLD AUTO: 0 % (ref 0–5)
LYMPHOCYTES # BLD: 1.2 K/UL (ref 0.5–4.6)
LYMPHOCYTES NFR BLD: 17 % (ref 13–44)
MCH RBC QN AUTO: 33.6 PG (ref 26.1–32.9)
MCHC RBC AUTO-ENTMCNC: 35.5 G/DL (ref 31.4–35)
MCV RBC AUTO: 94.5 FL (ref 79.6–97.8)
MONOCYTES # BLD: 1 K/UL (ref 0.1–1.3)
MONOCYTES NFR BLD: 13 % (ref 4–12)
NEUTS SEG # BLD: 5 K/UL (ref 1.7–8.2)
NEUTS SEG NFR BLD: 68 % (ref 43–78)
NRBC # BLD: 0 K/UL (ref 0–0.2)
PLATELET # BLD AUTO: 212 K/UL (ref 150–450)
PMV BLD AUTO: 10.1 FL (ref 9.4–12.3)
POTASSIUM SERPL-SCNC: 3.8 MMOL/L (ref 3.5–5.1)
PROT SERPL-MCNC: 5.3 G/DL (ref 6.3–8.2)
RBC # BLD AUTO: 3.66 M/UL (ref 4.05–5.2)
SERVICE CMNT-IMP: ABNORMAL
SODIUM SERPL-SCNC: 137 MMOL/L (ref 136–145)
WBC # BLD AUTO: 7.3 K/UL (ref 4.3–11.1)

## 2021-04-29 PROCEDURE — 36415 COLL VENOUS BLD VENIPUNCTURE: CPT

## 2021-04-29 PROCEDURE — 97116 GAIT TRAINING THERAPY: CPT

## 2021-04-29 PROCEDURE — 74011636637 HC RX REV CODE- 636/637: Performed by: FAMILY MEDICINE

## 2021-04-29 PROCEDURE — 80053 COMPREHEN METABOLIC PANEL: CPT

## 2021-04-29 PROCEDURE — 85025 COMPLETE CBC W/AUTO DIFF WBC: CPT

## 2021-04-29 PROCEDURE — 74011250637 HC RX REV CODE- 250/637: Performed by: FAMILY MEDICINE

## 2021-04-29 PROCEDURE — 74011000258 HC RX REV CODE- 258: Performed by: FAMILY MEDICINE

## 2021-04-29 PROCEDURE — 65270000029 HC RM PRIVATE

## 2021-04-29 PROCEDURE — 97161 PT EVAL LOW COMPLEX 20 MIN: CPT

## 2021-04-29 PROCEDURE — 74011250636 HC RX REV CODE- 250/636: Performed by: HOSPITALIST

## 2021-04-29 PROCEDURE — 82962 GLUCOSE BLOOD TEST: CPT

## 2021-04-29 PROCEDURE — 74011250636 HC RX REV CODE- 250/636: Performed by: FAMILY MEDICINE

## 2021-04-29 PROCEDURE — 97535 SELF CARE MNGMENT TRAINING: CPT

## 2021-04-29 PROCEDURE — 97165 OT EVAL LOW COMPLEX 30 MIN: CPT

## 2021-04-29 RX ADMIN — CEFTRIAXONE 1 G: 1 INJECTION, POWDER, FOR SOLUTION INTRAMUSCULAR; INTRAVENOUS at 08:15

## 2021-04-29 RX ADMIN — POTASSIUM CHLORIDE AND SODIUM CHLORIDE: 450; 150 INJECTION, SOLUTION INTRAVENOUS at 03:52

## 2021-04-29 RX ADMIN — INSULIN HUMAN 2 UNITS: 100 INJECTION, SOLUTION PARENTERAL at 16:55

## 2021-04-29 RX ADMIN — ENOXAPARIN SODIUM 40 MG: 40 INJECTION SUBCUTANEOUS at 08:15

## 2021-04-29 RX ADMIN — Medication 10 ML: at 15:05

## 2021-04-29 RX ADMIN — Medication 10 ML: at 21:21

## 2021-04-29 RX ADMIN — ROSUVASTATIN 20 MG: 20 TABLET, FILM COATED ORAL at 21:20

## 2021-04-29 RX ADMIN — PANTOPRAZOLE SODIUM 40 MG: 40 TABLET, DELAYED RELEASE ORAL at 05:38

## 2021-04-29 RX ADMIN — Medication 10 ML: at 05:37

## 2021-04-29 RX ADMIN — POTASSIUM CHLORIDE AND SODIUM CHLORIDE: 450; 150 INJECTION, SOLUTION INTRAVENOUS at 15:05

## 2021-04-29 NOTE — PROGRESS NOTES
Problem: Self Care Deficits Care Plan (Adult)  Goal: *Acute Goals and Plan of Care (Insert Text)  Description: 1. Patient will perform grooming with supervision standing at sink. 2. Patient will perform Upper body dressing with supervision  3. Patient will perform lower body dressing with supervision  4. Patient will perform upper and lower body bathing with supervision. 5. Patient will perform toilet transfers with CGA. 6. Patient will perform shower transfer with CGA. 7. Patient will participate in 30 + minutes of ADL/ therapeutic exercise/therapeutic activity with min rest breaks to increase activity tolerance for self care. 8. Patient will perform ADL functional mobility in room with CGA. Goals to be achieved in 7 days. Outcome: Progressing Towards Goal     OCCUPATIONAL THERAPY: Initial Assessment, Daily Note, and PM 4/29/2021  INPATIENT: OT Visit Days: 1  [place] / [place] / Product Type: Medicare /      NAME/AGE/GENDER: Tata Jung is a 68 y.o. female   PRIMARY DIAGNOSIS:  Acute gastroenteritis [K52.9] Acute gastroenteritis Acute gastroenteritis        ICD-10: Treatment Diagnosis:    Generalized Muscle Weakness (M62.81)  Other lack of cordination (R27.8)   Precautions/Allergies:     Lortab [hydrocodone-acetaminophen] and Acetaminophen      ASSESSMENT:     Ms. Donny Guzman presents with above diagnosis and seen in room with PT,  and son in law present. Pt up in room and to bathroom for toileting. Noted decreased balance, activity tolerance and generalized weakness. Pt is not at her baseline of independent  with self care, driving and walking 3 miles a day. Pt would benefit from OT to maximize safety and independence with self care and functional mobility.      This section established at most recent assessment   PROBLEM LIST (Impairments causing functional limitations):  Decreased Strength  Decreased ADL/Functional Activities  Decreased Ambulation Ability/Technique  Decreased Balance  Increased Pain  Decreased Activity Tolerance   INTERVENTIONS PLANNED: (Benefits and precautions of occupational therapy have been discussed with the patient.)  Activities of daily living training  Adaptive equipment training  Balance training  Therapeutic activity  Therapeutic exercise     TREATMENT PLAN: Frequency/Duration: Follow patient 3 times per week to address above goals. Rehabilitation Potential For Stated Goals: Good     REHAB RECOMMENDATIONS (at time of discharge pending progress):    Placement: It is my opinion, based on this patient's performance to date, that Ms. Hayder Weeks may benefit from participating in 1-2 additional therapy sessions in order to continue to assess for rehab potential and then make recommendation for disposition at discharge. Equipment:   None at this time              OCCUPATIONAL PROFILE AND HISTORY:   History of Present Injury/Illness (Reason for Referral):  See H&P  Past Medical History/Comorbidities:   Ms. Hayder Weeks  has a past medical history of DDD (degenerative disc disease), lumbar (2/19/2020), DM2 (diabetes mellitus, type 2) ([address] (3/4/2014), Gastroesophageal reflux disease without esophagitis (5/22/2015), HLD (hyperlipidemia), HTN (hypertension), Menopause, Sciatica of right side (2/19/2020), and UTI (urinary tract infection) (4/27/2021). She also has no past medical history of Asthma, Autoimmune disease ([address] CAD (coronary artery disease), Cancer ([address] Chronic kidney disease, COPD, Dementia, Heart failure ([address] Liver disease, Other ill-defined conditions(799.89), Psychiatric disorder, PUD (peptic ulcer disease), Seizures ([address] Sleep disorder, Stroke ([address] or Thromboembolus ([address] Ms. Hayder Weeks  has a past surgical history that includes hx hysterectomy; hx partial thyroidectomy; hx other surgical; hx colonoscopy (01/25/2010); and neurological procedure unlisted (2014).   Social History/Living Environment:   Home Environment: Private residence  One/Two Story Residence: One story  Living Alone: No  Support Systems: Spouse/Significant Other/Partner  Patient Expects to be Discharged to[de-identified] Private residence  Current DME Used/Available at Home: None  Prior Level of Function/Work/Activity:  Independent      Number of Personal Factors/Comorbidities that affect the Plan of Care: Brief history (0):  LOW COMPLEXITY   ASSESSMENT OF OCCUPATIONAL PERFORMANCE[de-identified]   Activities of Daily Living:   Basic ADLs (From Assessment) Complex ADLs (From Assessment)   Feeding: Independent  Oral Facial Hygiene/Grooming: Setup, Contact guard assistance  Bathing: Minimum assistance  Upper Body Dressing: Minimum assistance  Lower Body Dressing: Minimum assistance  Toileting: Minimum assistance     Grooming/Bathing/Dressing Activities of Daily Living     Cognitive Retraining  Safety/Judgement: Awareness of environment                 Functional Transfers  Bathroom Mobility: Minimum assistance  Toilet Transfer : Minimum assistance  Shower Transfer: Minimum assistance     Bed/Mat Mobility  Supine to Sit: Contact guard assistance  Sit to Supine: Contact guard assistance  Sit to Stand: Minimum assistance  Stand to Sit: Minimum assistance  Bed to Chair: Minimum assistance     Most Recent Physical Functioning:   Gross Assessment:                  Posture:     Balance:  Sitting: Intact  Standing: Pull to stand; With support Bed Mobility:  Supine to Sit: Contact guard assistance  Sit to Supine: Contact guard assistance  Wheelchair Mobility:     Transfers:  Sit to Stand: Minimum assistance  Stand to Sit: Minimum assistance  Bed to Chair: Minimum assistance            Patient Vitals for the past 6 hrs:   BP BP Patient Position SpO2 Pulse   04/29/21 1600 (!) 151/74 At rest 99 % 85       Mental Status  Neurologic State: Alert  Orientation Level: Oriented X4  Cognition: Appropriate decision making  Perception: Appears intact  Perseveration: No perseveration noted  Safety/Judgement: Awareness of environment                          Physical Skills Involved:  Range of Motion  Balance  Strength Cognitive Skills Affected (resulting in the inability to perform in a timely and safe manner):  none Psychosocial Skills Affected:  Environmental Adaptation   Number of elements that affect the Plan of Care: 3-5:  MODERATE COMPLEXITY   CLINICAL DECISION MAKIN[address] [place] [address] AM-PAC 6 Clicks   Daily Activity Inpatient Short Form  How much help from another person does the patient currently need. .. Total A Lot A Little None   1. Putting on and taking off regular lower body clothing? [] 1   [] 2   [x] 3   [] 4   2. Bathing (including washing, rinsing, drying)? [] 1   [] 2   [x] 3   [] 4   3. Toileting, which includes using toilet, bedpan or urinal?   [] 1   [] 2   [x] 3   [] 4   4. Putting on and taking off regular upper body clothing? [] 1   [] 2   [x] 3   [] 4   5. Taking care of personal grooming such as brushing teeth? [] 1   [] 2   [x] 3   [] 4   6. Eating meals? [] 1   [] 2   [] 3   [x] 4   © , Trustees of [address] [place] [address], under license to [employer]. All rights reserved      Score:  Initial: 19 Most Recent: X (Date: -- )    Interpretation of Tool:  Represents activities that are increasingly more difficult (i.e. Bed mobility, Transfers, Gait). Use of outcome tool(s) and clinical judgement create a POC that gives a: LOW COMPLEXITY         TREATMENT:   (In addition to Assessment/Re-Assessment sessions the following treatments were rendered)     Pre-treatment Symptoms/Complaints:    Pain: Initial:     0 Post Session:  0     Self Care: (10 min): Procedure(s) (per grid) utilized to improve and/or restore self-care/home management as related to toileting and grooming. Required minimal verbal, manual, and   cueing to facilitate activities of daily living skills and compensatory activities.   OT evaluation completed   Braces/Orthotics/Lines/Etc:   IV  O2 Device: None (Room air)  Treatment/Session Assessment:    Response to Treatment:  pt up in room and to bathroom tolerated well  Interdisciplinary Collaboration:   Physical Therapist  Occupational Therapist  Registered Nurse  After treatment position/precautions:   Supine in bed  Bed/Chair-wheels locked  Bed in low position  Call light within reach  RN notified  Family at bedside   Compliance with Program/Exercises: Compliant all of the time, Will assess as treatment progresses. Recommendations/Intent for next treatment session: \"Next visit will focus on advancements to more challenging activities and reduction in assistance provided\".   Total Treatment Duration:  OT Patient Time In/Time Out  Time In: 1450  Time Out: [address], OT

## 2021-04-29 NOTE — PROGRESS NOTES
1830-END OF SHIFT NOTE:    -pt rested well throughout the shift  -ambulated in hallways with PT   -pt had no complaints of pain/nausea during shift   -tolerating full liquids well   -vss, no needs voiced at this time     Intake/Output  04/29 0701 - 04/29 1900  In: 3946 [P.O.:270; I.V.:1111]  Out: -  (pt not measuring)   Voiding: YES  Catheter: NO  Drain:        Stool:  1 occurrences. Emesis:  0 occurrences. VITAL SIGNS  Patient Vitals for the past 12 hrs:   Temp Pulse Resp BP SpO2   04/29/21 1600 98.1 °F (36.7 °C) 85 18 (!) 151/74 99 %   04/29/21 0736 97.8 °F (36.6 °C) 89 17 (!) 177/66 95 %       Pain Assessment  Pain 1  Pain Scale 1: Numeric (0 - 10) (04/29/21 1530)  Pain Intensity 1: 0 (04/29/21 1530)  Patient Stated Pain Goal: 0 (04/29/21 0810)  Pain Reassessment 1: Yes (04/29/21 0250)  Pain Onset 1: ongoing (04/27/21 1720)  Pain Location 1: Abdomen (04/27/21 1720)  Pain Orientation 1: Lower (04/27/21 1720)  Pain Description 1: Dull (04/27/21 1720)    Ambulating  Yes    Additional Information:     Shift report given to oncoming nurse at the bedside.     Lyudmila Salvador RN

## 2021-04-29 NOTE — PROGRESS NOTES
[facility] Progress Note    INTERVAL HISTORY  / Subjective:  Some nausea, but no c/o vomiting, she is belching and passing some flatus, she feels optimistic to start some full liquids though conservatively. She has not had a BM yet, some stool in CT. Assessment / Plan:    Distended loops of bowel, possible small bowel obstruction  Former surgical history with prior abdominal hysterectomy  04/28 she is no longer vomiting, she her last bowel movement was Monday, she is able to belch and pass gas though not as much as usual, request repeat CT abdomen pelvis with contrast, continue IV hydration with one half NS with potassium additive, abdominal exam is soft, there is tenderness to deep palpation, bowel sounds are hyperactive, I will I will clear liquid diet for now since she is no longer nausea and vomiting, surgery consut if needed t pending CT abdomen pelvis results  --04/29 Some nausea, but no c/o vomiting, she is belching and passing some flatus, she feels optimistic to start some full liquids though conservatively. She has not had a BM yet, some stool in CT noted, the  CT Also revealing and confirming  Dilatation of bowel loops proximal to this, likely partial small bowel  Obstruction w Contrast does progress to the level of the rectum as per the radiologist impression. Currently she is on Conservative management at this time with IV Antiemetics, IV Fluids w 1/2 NS + KCL Additive. Would request surgery consultation for any additional recommendations.      Acute UTI  Urinalysis showed 4+ bacteria, reflex culture pending, she is already been started on IV ceftriaxone, she has no definitive symptoms that she reports of UTI    Diabetes mellitus type 2  Hemoglobin A1c in control at 6.4  Hold Metformin  Continue with IV hydration    Objective:  Visit Vitals  BP (!) 177/66 (BP 1 Location: Right upper arm, BP Patient Position: At rest)   Pulse 89   Temp 97.8 °F (36.6 °C)   Resp 17   Ht 5' 4\" (1.626 m)   Wt 60.8 kg (134 lb)   SpO2 95%   BMI 23.00 kg/m²                 Physical Exam:  General: Mildly ill-appearing owing to abdominal pain  HEENT: Pupils equal and reactive to light and accommodation, oropharynx is clear   Neck: Supple, no lymphadenopathy, no JVD   Lungs: Clear to auscultation bilaterally   Cardiovascular: Regular rate and rhythm with normal S1 and S2   Abdomen: Soft, no longer tender to deep palpation, bowel sounds are hyperactive some  extremities: No cyanosis clubbing or edema   Neuro: Nonfocal, A&O x3   Psych: Normal affect     Intake and Output:  Date 04/28/21 0700 - 04/29/21 0659 04/29/21 0700 - 04/30/21 0659   Shift 3551-8490 9529-7611 24 Hour Total 4085-1821 6770-3487 24 Hour Total   INTAKE   P.O. 250 120 370 120  120     P. O. 250 120 370 120  120   I. V.(mL/kg/hr) 256(0.4) 1038(1.4) 1294(0.9)        I.V. 256 1038 1294      Shift Total(mL/kg) 506(8.3) 1158(19.1) 3926(71.5) 120(2)  120(2)   OUTPUT   Urine(mL/kg/hr) 300(0.4)  300(0.2)        Urine Voided 300  300        Urine Occurrence(s) 1 x 1 x 2 x      Emesis/NG output           Emesis Occurrence(s) 1 x  1 x      Shift Total(mL/kg) 300(4.9)  300(4.9)       1158 1364 120  120   Weight (kg) 60.8 60.8 60.8 60.8 60.8 60.8       LAB:  Admission on 04/27/2021   Component Date Value    WBC 04/27/2021 16.0*    RBC 04/27/2021 4.38     HGB 04/27/2021 14.6     HCT 04/27/2021 42.0     MCV 04/27/2021 95.9     MCH 04/27/2021 33.3*    MCHC 04/27/2021 34.8     RDW 04/27/2021 12.3     PLATELET 15/51/6966 628     MPV 04/27/2021 10.5     ABSOLUTE NRBC 04/27/2021 0.00     DF 04/27/2021 AUTOMATED     NEUTROPHILS 04/27/2021 83*    LYMPHOCYTES 04/27/2021 7*    MONOCYTES 04/27/2021 9     EOSINOPHILS 04/27/2021 0*    BASOPHILS 04/27/2021 1     IMMATURE GRANULOCYTES 04/27/2021 0     ABS. NEUTROPHILS 04/27/2021 13.3*    ABS. LYMPHOCYTES 04/27/2021 1.1     ABS. MONOCYTES 04/27/2021 1.4*    ABS. EOSINOPHILS 04/27/2021 0.0     ABS. BASOPHILS 04/27/2021 0.1     ABS. IMM. GRANS. 04/27/2021 0.0     Sodium 04/27/2021 137     Potassium 04/27/2021 3.8     Chloride 04/27/2021 100     CO2 04/27/2021 25     Anion gap 04/27/2021 12     Glucose 04/27/2021 168*    BUN 04/27/2021 30*    Creatinine 04/27/2021 0.80     GFR est AA 04/27/2021 >60     GFR est non-AA 04/27/2021 >60     Calcium 04/27/2021 9.2     Bilirubin, total 04/27/2021 0.7     ALT (SGPT) 04/27/2021 12     AST (SGOT) 04/27/2021 14*    Alk. phosphatase 04/27/2021 60     Protein, total 04/27/2021 7.0     Albumin 04/27/2021 3.7     Globulin 04/27/2021 3.3     A-G Ratio 04/27/2021 1.1*    Lipase 04/27/2021 48*    Lactic acid 04/27/2021 2.1*    Special Requests: 04/27/2021                      Value:NO SPECIAL REQUESTS  LEFT  HAND      Culture result: 04/27/2021 NO GROWTH 2 DAYS     Special Requests: 04/27/2021                      Value:RIGHT  Antecubital      Culture result: 04/27/2021 NO GROWTH 2 DAYS     Lactic acid 04/27/2021 1.7     Glucose (POC) 04/27/2021 138*    Performed by 04/27/2021 WelbornRNCaroline     Sodium 04/28/2021 138     Potassium 04/28/2021 3.6     Chloride 04/28/2021 103     CO2 04/28/2021 26     Anion gap 04/28/2021 9     Glucose 04/28/2021 150*    BUN 04/28/2021 31*    Creatinine 04/28/2021 0.58*    GFR est AA 04/28/2021 >60     GFR est non-AA 04/28/2021 >60     Calcium 04/28/2021 8.9     Bilirubin, total 04/28/2021 0.6     ALT (SGPT) 04/28/2021 10*    AST (SGOT) 04/28/2021 13*    Alk.  phosphatase 04/28/2021 48*    Protein, total 04/28/2021 6.0*    Albumin 04/28/2021 3.3     Globulin 04/28/2021 2.7     A-G Ratio 04/28/2021 1.2     WBC 04/28/2021 10.4     RBC 04/28/2021 3.89*    HGB 04/28/2021 12.8     HCT 04/28/2021 37.2     MCV 04/28/2021 95.6     MCH 04/28/2021 32.9     MCHC 04/28/2021 34.4     RDW 04/28/2021 12.3     PLATELET 33/27/4136 658     MPV 04/28/2021 10.0     ABSOLUTE NRBC 04/28/2021 0.00     DF 04/28/2021 AUTOMATED     NEUTROPHILS 04/28/2021 79*    LYMPHOCYTES 04/28/2021 8*    MONOCYTES 04/28/2021 13*    EOSINOPHILS 04/28/2021 0*    BASOPHILS 04/28/2021 1     IMMATURE GRANULOCYTES 04/28/2021 0     ABS. NEUTROPHILS 04/28/2021 8.2     ABS. LYMPHOCYTES 04/28/2021 0.8     ABS. MONOCYTES 04/28/2021 1.3     ABS. EOSINOPHILS 04/28/2021 0.0     ABS. BASOPHILS 04/28/2021 0.1     ABS. IMM. GRANS. 04/28/2021 0.0     Glucose (POC) 04/28/2021 145*    Performed by 04/28/2021 HagoodTiffinayPCT     Glucose (POC) 04/28/2021 129*    Performed by 04/28/2021 HagoodTiffinayPCT     Glucose (POC) 04/28/2021 122*    Performed by 04/28/2021 DockeryAbigailRNBSN     Glucose (POC) 04/28/2021 115*    Performed by 04/28/2021 JacksonDebbiePCT     Sodium 04/29/2021 137     Potassium 04/29/2021 3.8     Chloride 04/29/2021 104     CO2 04/29/2021 26     Anion gap 04/29/2021 7     Glucose 04/29/2021 119*    BUN 04/29/2021 25*    Creatinine 04/29/2021 0.46*    GFR est AA 04/29/2021 >60     GFR est non-AA 04/29/2021 >60     Calcium 04/29/2021 8.5     Bilirubin, total 04/29/2021 0.4     ALT (SGPT) 04/29/2021 <6*    AST (SGOT) 04/29/2021 9*    Alk. phosphatase 04/29/2021 42*    Protein, total 04/29/2021 5.3*    Albumin 04/29/2021 2.9*    Globulin 04/29/2021 2.4     A-G Ratio 04/29/2021 1.2     WBC 04/29/2021 7.3     RBC 04/29/2021 3.66*    HGB 04/29/2021 12.3     HCT 04/29/2021 34.6*    MCV 04/29/2021 94.5     MCH 04/29/2021 33.6*    MCHC 04/29/2021 35.5*    RDW 04/29/2021 12.2     PLATELET 48/40/5148 606     MPV 04/29/2021 10.1     ABSOLUTE NRBC 04/29/2021 0.00     DF 04/29/2021 AUTOMATED     NEUTROPHILS 04/29/2021 68     LYMPHOCYTES 04/29/2021 17     MONOCYTES 04/29/2021 13*    EOSINOPHILS 04/29/2021 1     BASOPHILS 04/29/2021 0     IMMATURE GRANULOCYTES 04/29/2021 0     ABS. NEUTROPHILS 04/29/2021 5.0     ABS. LYMPHOCYTES 04/29/2021 1.2     ABS. MONOCYTES 04/29/2021 1.0     ABS. EOSINOPHILS 04/29/2021 0.1     ABS. BASOPHILS 04/29/2021 0.0     ABS. IMM. GRANS. 04/29/2021 0.0     Glucose (POC) 04/29/2021 105*    Performed by 04/29/2021 Cali        IMAGING:  Xr Abd (kub)    Result Date: 4/27/2021  Mildly dilated small bowel loops concerning for a partial small bowel obstruction. Ct Abd Pelv W Cont    Result Date: 4/28/2021  1. Abnormal small bowel, most prominently involving focal wall thickening and inflammation in a right lower quadrant loop. 2. Dilatation of bowel loops proximal to this, likely partial small bowel obstruction. Contrast does progress to the level of the rectum. 3. Increased ascites, small volume. 4. Right small pleural effusion. Mild bibasilar lung infiltrates versus atelectasis. Ct Abd Pelv W Cont    Result Date: 4/26/2021  1. Nonspecific appearance of several small and large bowel loops, possibly infection or inflammation. No high-grade obstruction. 2. No acute abnormality elsewhere. EKG:  No results found for this or any previous visit.           Radha Chung MD  4/29/2021 3:34 PM

## 2021-04-29 NOTE — PROGRESS NOTES
Problem: Mobility Impaired (Adult and Pediatric)  Goal: *Acute Goals and Plan of Care (Insert Text)  Description: STG:  (1.)Ms. Priyanka Miles will move from supine to sit and sit to supine  with STAND BY ASSIST within 4-7 treatment day(s). (2.)Ms. Priyanka Miles will transfer from bed to chair and chair to bed with STAND BY ASSIST using the least restrictive device within 4-7 treatment day(s). (3.)Ms. Priyanka Miles will ambulate with STAND BY ASSIST for 400 feet with the least restrictive device within 4-7 treatment day(s). ________________________________________________________________________________________________      Outcome: Progressing Towards Goal     PHYSICAL THERAPY: Initial Assessment and PM 4/29/2021  INPATIENT: PT Visit Days : 1  [place] / [place] / Product Type: Medicare /       NAME/AGE/GENDER: Silvia Mason is a 68 y.o. female   PRIMARY DIAGNOSIS: Acute gastroenteritis [K52.9] Acute gastroenteritis Acute gastroenteritis        ICD-10: Treatment Diagnosis:    Generalized Muscle Weakness (M62.81)  Difficulty in walking, Not elsewhere classified (R26.2)   Precaution/Allergies:  Lortab [hydrocodone-acetaminophen] and Acetaminophen      ASSESSMENT:     Ms. Priyanka Miles presents with generalized weakness and decreased independence with functional mobility. She was admitted with above diagnosis and is supine on contact with spouse at bedside. She is normally independent with mobility without assistive devices and had been walking around 3 miles every day. On assessment she walked into bathroom, she is unsteady on her feet and would be unsafe to be up without assist. Worked on walking in the hallway with hand held assist and pt walked 200 feet with hand held assist. On return to room she returned supine with needs in reach and family remains at bedside.  She may benefit from a walker to use for stability and would benefit from HHPT for follow up when she is discharge from the hospital.    This section established at most recent assessment   PROBLEM LIST (Impairments causing functional limitations):  Decreased Strength  Decreased ADL/Functional Activities  Decreased Transfer Abilities  Decreased Ambulation Ability/Technique  Decreased Balance  Decreased Activity Tolerance   INTERVENTIONS PLANNED: (Benefits and precautions of physical therapy have been discussed with the patient.)  Balance Exercise  Bed Mobility  Gait Training  Therapeutic Activites  Therapeutic Exercise/Strengthening  Transfer Training     TREATMENT PLAN: Frequency/Duration: daily for duration of hospital stay  Rehabilitation Potential For Stated Goals: Good     REHAB RECOMMENDATIONS (at time of discharge pending progress):    Placement: It is my opinion, based on this patient's performance to date, that Ms. Latisha Mattson may benefit from [address] after discharge due to the functional deficits listed above that are likely to improve with skilled rehabilitation because he/she has multiple medical issues that affect his/her functional mobility in the community. Equipment: To be determined, may need a rolling walker              HISTORY:   History of Present Injury/Illness (Reason for Referral):  PER MD NOTE: Ludie Peabody is a 68 y.o. female, with a history of  has a past medical history of DDD (degenerative disc disease), lumbar (2/19/2020), DM2 (diabetes mellitus, type 2) ([address] (3/4/2014), Gastroesophageal reflux disease without esophagitis (5/22/2015), HLD (hyperlipidemia), HTN (hypertension), Menopause, Sciatica of right side (2/19/2020), and UTI (urinary tract infection) (4/27/2021).  She also has no past medical history of Asthma, Autoimmune disease ([address] CAD (coronary artery disease), Cancer ([address] Chronic kidney disease, COPD, Dementia, Heart failure ([address] Liver disease, Other ill-defined conditions(799.89), Psychiatric disorder, PUD (peptic ulcer disease), Seizures ([address] Sleep disorder, Stroke ([address] or Thromboembolus [facility]).,  has a past surgical history that includes hx hysterectomy; hx partial thyroidectomy; hx other surgical; hx colonoscopy (01/25/2010); and neurological procedure unlisted (2014). who presents to the ER with report of two days of persistent lower abdominal pain, nausea, and vomiting. She was seen in the ER yesterday and was discharged home with diagnosis of UTI after labs and CT abd/pelvis were unremarkable for admitting diagnosis. Pt reports persistent nausea and vomiting since that time and was unable to keep down Keflex that was prescribed for the UTI. Denies any fevers, chills, diarrhea. Past Medical History/Comorbidities:   Ms. Fallon Delgado  has a past medical history of DDD (degenerative disc disease), lumbar (2/19/2020), DM2 (diabetes mellitus, type 2) ([address] (3/4/2014), Gastroesophageal reflux disease without esophagitis (5/22/2015), HLD (hyperlipidemia), HTN (hypertension), Menopause, Sciatica of right side (2/19/2020), and UTI (urinary tract infection) (4/27/2021). She also has no past medical history of Asthma, Autoimmune disease ([address] CAD (coronary artery disease), Cancer ([address] Chronic kidney disease, COPD, Dementia, Heart failure ([address] Liver disease, Other ill-defined conditions([address]), Psychiatric disorder, PUD (peptic ulcer disease), Seizures ([address] Sleep disorder, Stroke ([address] or Thromboembolus ([address] Ms. Fallon Delgado  has a past surgical history that includes hx hysterectomy; hx partial thyroidectomy; hx other surgical; hx colonoscopy (01/25/2010); and neurological procedure unlisted (2014).   Social History/Living Environment:   Home Environment: Private residence  One/Two Story Residence: Two story  Living Alone: No  Support Systems: Spouse/Significant Other/Partner  Patient Expects to be Discharged to[de-identified] Private residence  Current DME Used/Available at Home: None  Prior Level of Function/Work/Activity:  Pt lives at home with spouse, independent with mobility without assistive devices     Number of Personal Factors/Comorbidities that affect the Plan of Care: 0: LOW COMPLEXITY   EXAMINATION:   Most Recent Physical Functioning:   Gross Assessment:  AROM: Within functional limits  Strength: Generally decreased, functional               Posture:  Posture (WDL): Within defined limits  Balance:  Sitting: Intact  Standing: Pull to stand; With support Bed Mobility:  Supine to Sit: Contact guard assistance  Sit to Supine: Contact guard assistance  Wheelchair Mobility:     Transfers:  Sit to Stand: Minimum assistance  Stand to Sit: Minimum assistance  Gait:     Speed/Sunshine: Delayed; Shuffled  Step Length: Left shortened;Right shortened  Gait Abnormalities: Decreased step clearance  Distance (ft): 200 Feet (ft)  Assistive Device: (hand held assist)  Ambulation - Level of Assistance: Minimal assistance  Interventions: Safety awareness training;Verbal cues  Duration: 8 Minutes      Body Structures Involved:  Muscles Body Functions Affected: Movement Related Activities and Participation Affected: Mobility  Self Care   Number of elements that affect the Plan of Care: 4+: HIGH COMPLEXITY   CLINICAL PRESENTATION:   Presentation: Stable and uncomplicated: LOW COMPLEXITY   CLINICAL DECISION MAKIN [place] [id number] AM-PAC 6 Clicks   Basic Mobility Inpatient Short Form  How much difficulty does the patient currently have. .. Unable A Lot A Little None   1. Turning over in bed (including adjusting bedclothes, sheets and blankets)? [] 1   [] 2   [x] 3   [] 4   2. Sitting down on and standing up from a chair with arms ( e.g., wheelchair, bedside commode, etc.)   [] 1   [] 2   [x] 3   [] 4   3. Moving from lying on back to sitting on the side of the bed? [] 1   [] 2   [x] 3   [] 4   How much help from another person does the patient currently need. .. Total A Lot A Little None   4. Moving to and from a bed to a chair (including a wheelchair)? [] 1   [] 2   [x] 3   [] 4   5. Need to walk in hospital room?    [] 1   [] 2   [x] 3   [] 4   6. Climbing 3-5 steps with a railing? [] 1   [] 2   [x] 3   [] 4   © 2007, Trustees of [address] [place] [address] [address] [address], under license to [employer] ?. All rights reserved      Score:  Initial: 18 Most Recent: X (Date: -- )    Interpretation of Tool:  Represents activities that are increasingly more difficult (i.e. Bed mobility, Transfers, Gait). Medical Necessity:     Patient is expected to demonstrate progress in   strength and functional technique   to   decrease assistance required with functional mobility  . Reason for Services/Other Comments:  Patient continues to require skilled intervention due to   Inability to complete functional mobility independently  . Use of outcome tool(s) and clinical judgement create a POC that gives a: Clear prediction of patient's progress: LOW COMPLEXITY            TREATMENT:   (In addition to Assessment/Re-Assessment sessions the following treatments were rendered)   Pre-treatment Symptoms/Complaints:  none  Pain: Initial:   Pain Intensity 1: 0  Post Session:  0/10   Assessment   Gait Training (8 Minutes):  Gait training to improve and/or restore physical functioning as related to mobility and strength. Ambulated 200 Feet (ft) with Minimal assistance using a (hand held assist) and minimal Safety awareness training;Verbal cues related to their stance phase and stride length to promote proper body alignment and promote proper body posture. Braces/Orthotics/Lines/Etc:   O2 Device: None (Room air)  Treatment/Session Assessment:    Response to Treatment:  pt tolerated well  Interdisciplinary Collaboration:   Occupational Therapist  Registered Nurse  After treatment position/precautions:   Supine in bed  Bed/Chair-wheels locked  Bed in low position  Call light within reach  Family at bedside   Compliance with Program/Exercises: Compliant all of the time, Will assess as treatment progresses  Recommendations/Intent for next treatment session:   \"Next visit will focus on advancements to more challenging activities and reduction in assistance provided\".   Total Treatment Duration:  PT Patient Time In/Time Out  Time In: 1515  Time Out: 4343 Juan José Berger PT

## 2021-04-30 ENCOUNTER — HOME HEALTH ADMISSION (OUTPATIENT)
Dept: HOME HEALTH SERVICES | Facility: HOME HEALTH | Age: 81
End: 2021-04-30
Payer: MEDICARE

## 2021-04-30 LAB
ALBUMIN SERPL-MCNC: 2.7 G/DL (ref 3.2–4.6)
ALBUMIN/GLOB SERPL: 1 {RATIO} (ref 1.2–3.5)
ALP SERPL-CCNC: 39 U/L (ref 50–136)
ALT SERPL-CCNC: 11 U/L (ref 12–65)
ANION GAP SERPL CALC-SCNC: 5 MMOL/L (ref 7–16)
AST SERPL-CCNC: 16 U/L (ref 15–37)
BASOPHILS # BLD: 0 K/UL (ref 0–0.2)
BASOPHILS NFR BLD: 1 % (ref 0–2)
BILIRUB SERPL-MCNC: 0.5 MG/DL (ref 0.2–1.1)
BUN SERPL-MCNC: 13 MG/DL (ref 8–23)
CALCIUM SERPL-MCNC: 8.4 MG/DL (ref 8.3–10.4)
CHLORIDE SERPL-SCNC: 105 MMOL/L (ref 98–107)
CO2 SERPL-SCNC: 25 MMOL/L (ref 21–32)
CREAT SERPL-MCNC: 0.44 MG/DL (ref 0.6–1)
DIFFERENTIAL METHOD BLD: ABNORMAL
EOSINOPHIL # BLD: 0.2 K/UL (ref 0–0.8)
EOSINOPHIL NFR BLD: 2 % (ref 0.5–7.8)
ERYTHROCYTE [DISTWIDTH] IN BLOOD BY AUTOMATED COUNT: 12 % (ref 11.9–14.6)
GLOBULIN SER CALC-MCNC: 2.8 G/DL (ref 2.3–3.5)
GLUCOSE BLD STRIP.AUTO-MCNC: 107 MG/DL (ref 65–100)
GLUCOSE BLD STRIP.AUTO-MCNC: 124 MG/DL (ref 65–100)
GLUCOSE BLD STRIP.AUTO-MCNC: 142 MG/DL (ref 65–100)
GLUCOSE BLD STRIP.AUTO-MCNC: 170 MG/DL (ref 65–100)
GLUCOSE SERPL-MCNC: 129 MG/DL (ref 65–100)
HCT VFR BLD AUTO: 35.1 % (ref 35.8–46.3)
HGB BLD-MCNC: 12.2 G/DL (ref 11.7–15.4)
IMM GRANULOCYTES # BLD AUTO: 0 K/UL (ref 0–0.5)
IMM GRANULOCYTES NFR BLD AUTO: 0 % (ref 0–5)
LYMPHOCYTES # BLD: 1.1 K/UL (ref 0.5–4.6)
LYMPHOCYTES NFR BLD: 14 % (ref 13–44)
MCH RBC QN AUTO: 33.2 PG (ref 26.1–32.9)
MCHC RBC AUTO-ENTMCNC: 34.8 G/DL (ref 31.4–35)
MCV RBC AUTO: 95.4 FL (ref 79.6–97.8)
MONOCYTES # BLD: 1 K/UL (ref 0.1–1.3)
MONOCYTES NFR BLD: 12 % (ref 4–12)
NEUTS SEG # BLD: 6 K/UL (ref 1.7–8.2)
NEUTS SEG NFR BLD: 71 % (ref 43–78)
NRBC # BLD: 0 K/UL (ref 0–0.2)
PLATELET # BLD AUTO: 195 K/UL (ref 150–450)
PMV BLD AUTO: 9.5 FL (ref 9.4–12.3)
POTASSIUM SERPL-SCNC: 4.5 MMOL/L (ref 3.5–5.1)
PROT SERPL-MCNC: 5.5 G/DL (ref 6.3–8.2)
RBC # BLD AUTO: 3.68 M/UL (ref 4.05–5.2)
SERVICE CMNT-IMP: ABNORMAL
SODIUM SERPL-SCNC: 135 MMOL/L (ref 136–145)
WBC # BLD AUTO: 8.4 K/UL (ref 4.3–11.1)

## 2021-04-30 PROCEDURE — 74011000258 HC RX REV CODE- 258: Performed by: FAMILY MEDICINE

## 2021-04-30 PROCEDURE — 97530 THERAPEUTIC ACTIVITIES: CPT

## 2021-04-30 PROCEDURE — 74011250636 HC RX REV CODE- 250/636: Performed by: HOSPITALIST

## 2021-04-30 PROCEDURE — 74011000250 HC RX REV CODE- 250: Performed by: HOSPITALIST

## 2021-04-30 PROCEDURE — 74011636637 HC RX REV CODE- 636/637: Performed by: FAMILY MEDICINE

## 2021-04-30 PROCEDURE — 65270000029 HC RM PRIVATE

## 2021-04-30 PROCEDURE — 74011250636 HC RX REV CODE- 250/636: Performed by: FAMILY MEDICINE

## 2021-04-30 PROCEDURE — 74011250637 HC RX REV CODE- 250/637: Performed by: FAMILY MEDICINE

## 2021-04-30 PROCEDURE — 85025 COMPLETE CBC W/AUTO DIFF WBC: CPT

## 2021-04-30 PROCEDURE — 2709999900 HC NON-CHARGEABLE SUPPLY

## 2021-04-30 PROCEDURE — 80053 COMPREHEN METABOLIC PANEL: CPT

## 2021-04-30 PROCEDURE — 82962 GLUCOSE BLOOD TEST: CPT

## 2021-04-30 PROCEDURE — C9113 INJ PANTOPRAZOLE SODIUM, VIA: HCPCS | Performed by: HOSPITALIST

## 2021-04-30 RX ORDER — KETOROLAC TROMETHAMINE 15 MG/ML
15 INJECTION, SOLUTION INTRAMUSCULAR; INTRAVENOUS
Status: DISCONTINUED | OUTPATIENT
Start: 2021-04-30 | End: 2021-05-01

## 2021-04-30 RX ORDER — SODIUM CHLORIDE, SODIUM LACTATE, POTASSIUM CHLORIDE, CALCIUM CHLORIDE 600; 310; 30; 20 MG/100ML; MG/100ML; MG/100ML; MG/100ML
75 INJECTION, SOLUTION INTRAVENOUS CONTINUOUS
Status: DISPENSED | OUTPATIENT
Start: 2021-04-30 | End: 2021-05-01

## 2021-04-30 RX ORDER — ENOXAPARIN SODIUM 100 MG/ML
30 INJECTION SUBCUTANEOUS DAILY
Status: DISCONTINUED | OUTPATIENT
Start: 2021-05-01 | End: 2021-05-08 | Stop reason: HOSPADM

## 2021-04-30 RX ADMIN — Medication 10 ML: at 14:00

## 2021-04-30 RX ADMIN — PANTOPRAZOLE SODIUM 40 MG: 40 TABLET, DELAYED RELEASE ORAL at 05:27

## 2021-04-30 RX ADMIN — ROSUVASTATIN 20 MG: 20 TABLET, FILM COATED ORAL at 21:23

## 2021-04-30 RX ADMIN — SODIUM CHLORIDE 40 MG: 9 INJECTION, SOLUTION INTRAMUSCULAR; INTRAVENOUS; SUBCUTANEOUS at 14:24

## 2021-04-30 RX ADMIN — Medication 10 ML: at 21:29

## 2021-04-30 RX ADMIN — KETOROLAC TROMETHAMINE 15 MG: 15 INJECTION, SOLUTION INTRAMUSCULAR; INTRAVENOUS at 16:53

## 2021-04-30 RX ADMIN — SODIUM CHLORIDE, SODIUM LACTATE, POTASSIUM CHLORIDE, AND CALCIUM CHLORIDE 75 ML/HR: 600; 310; 30; 20 INJECTION, SOLUTION INTRAVENOUS at 16:49

## 2021-04-30 RX ADMIN — POTASSIUM CHLORIDE AND SODIUM CHLORIDE: 450; 150 INJECTION, SOLUTION INTRAVENOUS at 05:10

## 2021-04-30 RX ADMIN — INSULIN HUMAN 3 UNITS: 100 INJECTION, SOLUTION PARENTERAL at 12:12

## 2021-04-30 RX ADMIN — ACETAMINOPHEN 650 MG: 325 TABLET, FILM COATED ORAL at 05:38

## 2021-04-30 RX ADMIN — Medication 10 ML: at 05:27

## 2021-04-30 RX ADMIN — CEFTRIAXONE 1 G: 1 INJECTION, POWDER, FOR SOLUTION INTRAMUSCULAR; INTRAVENOUS at 08:06

## 2021-04-30 RX ADMIN — ENOXAPARIN SODIUM 40 MG: 40 INJECTION SUBCUTANEOUS at 08:07

## 2021-04-30 NOTE — PROGRESS NOTES
Care Management Interventions  PCP Verified by CM: Yes  Palliative Care Criteria Met (RRAT>21 & CHF Dx)?: No(Dx Gastrenteritis Risk 9%)  Mode of Transport at Discharge: Self  Transition of Care Consult (CM Consult): Discharge Planning  Discharge Durable Medical Equipment: No  Physical Therapy Consult: Yes  Occupational Therapy Consult: Yes  Speech Therapy Consult: No  Current Support Network: Lives with Spouse  Confirm Follow Up Transport: Family  The Plan for Transition of Care is Related to the Following Treatment Goals : debility  The Patient and/or Patient Representative was Provided with a Choice of Provider and Agrees with the Discharge Plan?: Yes  Name of the Patient Representative Who was Provided with a Choice of Provider and Agrees with the Discharge Plan: Patient   Freedom of Choice List was Provided with Basic Dialogue that Supports the Patient's Individualized Plan of Care/Goals, Treatment Preferences and Shares the Quality Data Associated with the Providers?: Yes  Discharge Location  Discharge Placement: Home with home health  CM met with patient for d/c planning. She initially was to be d/c today but is concerned if she eats she may get sick. Patient was to advance diet for lunch and see how she did and encouraged her to speak with Dr. Thelma Hollins for her concerns about d/c and she voiced understanding. PT recommends home health follow up and patient is in agreement with home health and provided with list of providers for Wilmer Gordon and she chose [facility] home health. Orders obtained and referral sent to [facility] and notified [facility] liaison of referral. Patient to d/c home with spouse and [facility] home health when medically stable.

## 2021-04-30 NOTE — DISCHARGE INSTR - DIET
· Good nutrition is important when healing from an illness, injury, or surgery. Follow any nutrition recommendations given to you during your hospital stay. · If you were given an oral nutrition supplement while in the hospital, continue to take this supplement at home. You can take it with meals, in-between meals, and/or before bedtime. These supplements can be purchased at most local grocery stores, pharmacies, and chain super-stores. · If you have any questions about your diet or nutrition, call the hospital and ask for the dietitian: Atiya Almodovar, HUMBERTO, LD, [facility] [phone]    Low fiber diet until cleared my MD to progress to regular diet. If unable tot tolerate solid food, can down grade to full liquid diet and include Glucerna shake or comparable.

## 2021-04-30 NOTE — CONSULTS
Nutrition Education    Hubsand and wife had reviewed information provided this morning(low fiber ( < 11 grams/d) Verbally reviewed an overview of the rationale and guidelines with Patient and Family member (). All questions answered. Suggested starting with all cooked vegetables and then adding low fiber raw vegetables as tolerated.  Also suggested downgrade to full liquids if unable to tolerate solid food and to include ONS ( Glucerna shake or comparable)      Electronically signed by Farzad Rivera RD on 4/30/2021 at 2:27 PM    Contact Number:

## 2021-04-30 NOTE — PROGRESS NOTES
Problem: Falls - Risk of  Goal: *Absence of Falls  Description: Document Roscoe Desai Fall Risk and appropriate interventions in the flowsheet.   Outcome: Progressing Towards Goal  Note: Fall Risk Interventions:  Mobility Interventions: Bed/chair exit alarm         Medication Interventions: Patient to call before getting OOB    Elimination Interventions: Bed/chair exit alarm              Problem: Patient Education: Go to Patient Education Activity  Goal: Patient/Family Education  Outcome: Progressing Towards Goal     Problem: Patient Education: Go to Patient Education Activity  Goal: Patient/Family Education  Outcome: Progressing Towards Goal     Problem: Patient Education: Go to Patient Education Activity  Goal: Patient/Family Education  Outcome: Progressing Towards Goal

## 2021-04-30 NOTE — PROGRESS NOTES
END OF SHIFT NOTE:    Intake/Output  No intake/output data recorded. Voiding: YES  Catheter: NO  Drain:              Stool:  1 occurrences. Stool Assessment  Stool Color: [place] (04/29/21 2013)  Stool Appearance: Soft (04/29/21 2013)  Stool Amount: Small (04/29/21 2013)    Emesis:  0 occurrences. VITAL SIGNS  Patient Vitals for the past 12 hrs:   Temp Pulse Resp BP SpO2   04/30/21 1559 98 °F (36.7 °C) 77 17 (!) 168/71 96 %   04/30/21 1109 98.3 °F (36.8 °C) 80 18 (!) 165/70 93 %   04/30/21 0810 98 °F (36.7 °C) 78 16 (!) 172/73 96 %       Pain Assessment  Pain 1  Pain Scale 1: Numeric (0 - 10) (04/30/21 1653)  Pain Intensity 1: 4 (04/30/21 1653)  Patient Stated Pain Goal: 0 (04/30/21 0810)  Pain Reassessment 1: Patient resting w/respiratory rate greater than 10 (04/30/21 1730)  Pain Onset 1: ongoing (04/27/21 1720)  Pain Location 1: Abdomen (04/30/21 1653)  Pain Orientation 1: Lower (04/27/21 1720)  Pain Description 1: Aching (04/30/21 1653)  Pain Intervention(s) 1: Medication (see MAR) (04/30/21 1653)    Ambulating  Yes    Additional Information: Patient concerned with abd pain still. Spoke with doctor will spend another night. Continue diet low in fiber     Shift report given to oncoming nurse at the bedside.     Unique Mcadams RN

## 2021-04-30 NOTE — PROGRESS NOTES
[facility] Hospitalist Service Progress Note    INTERVAL HISTORY  / Subjective:  Nausea improved, she is having formed stools appear dark but not as black as ink. She had her diet advanced today and it had worsened her abd pain slightly. Assessment / Plan:    Distended loops of bowel, possible small bowel obstruction  Former surgical history with prior abdominal hysterectomy  04/28 she is no longer vomiting, she her last bowel movement was Monday, she is able to belch and pass gas though not as much as usual, request repeat CT abdomen pelvis with contrast, continue IV hydration with one half NS with potassium additive, abdominal exam is soft, there is tenderness to deep palpation, bowel sounds are hyperactive, I will I will clear liquid diet for now since she is no longer nausea and vomiting, surgery consut if needed t pending CT abdomen pelvis results  --04/29 Some nausea, but no c/o vomiting, she is belching and passing some flatus, she feels optimistic to start some full liquids though conservatively. She has not had a BM yet, some stool in CT noted, the  CT Also revealing and confirming  Dilatation of bowel loops proximal to this, likely partial small bowel  Obstruction w Contrast does progress to the level of the rectum as per the radiologist impression. Currently she is on Conservative management at this time with IV Antiemetics, IV Fluids w 1/2 NS + KCL Additive. Would request surgery consultation for any additional recommendations.   --04/30 Nausea improved, she is having formed stools appear dark but not as black as ink. She had her diet advanced today and it had worsened her abd pain slightly now burning in sensation . Start IV Protonix . Surgery reviewed CT and impression is No evidence of bowel obstruction on review of CT scan    ?  Acute UTI  Urinalysis showed 4+ bacteria, reflex culture pending, she is already been started on IV ceftriaxone, she has no definitive symptoms that she reports of UTI    Diabetes mellitus type 2  Hemoglobin A1c in control at 6.4  Hold Metformin  Continue with IV hydration    Objective:  Visit Vitals  BP (!) 165/70 (BP 1 Location: Right upper arm, BP Patient Position: At rest)   Pulse 80   Temp 98.3 °F (36.8 °C)   Resp 18   Ht 5' 4\" (1.626 m)   Wt 60.8 kg (134 lb)   SpO2 93%   BMI 23.00 kg/m²                 Physical Exam:  General: Mildly ill-appearing owing to abdominal pain  HEENT: Pupils equal and reactive to light and accommodation, oropharynx is clear   Neck: Supple, no lymphadenopathy, no JVD   Lungs: Clear to auscultation bilaterally   Cardiovascular: Regular rate and rhythm with normal S1 and S2   Abdomen: Soft, no longer tender to deep palpation, bowel sounds are hyperactive some  extremities: No cyanosis clubbing or edema   Neuro: Nonfocal, A&O x3   Psych: Normal affect     Intake and Output:  Date 04/29/21 0700 - 04/30/21 0659 04/30/21 0700 - 05/01/21 0659   Shift 1564-9091 6701-7434 24 Hour Total 6898-2822 0767-1955 24 Hour Total   INTAKE   P.O. 270 120 390        P.O. 270 120 390      I. V.(mL/kg/hr) 1111(1.5) 754(1) 1865(1.3) 301  301     I.V. 3293 550 1802 301  301   Shift Total(mL/kg) 1381(22.7) 874(14.4) 2255(37.1) 301(5)  301(5)   OUTPUT   Urine(mL/kg/hr)           Urine Occurrence(s) 4 x 3 x 7 x 2 x  2 x   Stool           Stool Occurrence(s) 1 x 1 x 2 x 1 x  1 x   Shift Total(mL/kg)         NET 7864 360 4515 301  301   Weight (kg) 60.8 60.8 60.8 60.8 60.8 60.8       LAB:  No results displayed because visit has over 200 results. IMAGING:  Xr Abd (kub)    Result Date: 4/27/2021  Mildly dilated small bowel loops concerning for a partial small bowel obstruction. Ct Abd Pelv W Cont    Result Date: 4/28/2021  1. Abnormal small bowel, most prominently involving focal wall thickening and inflammation in a right lower quadrant loop. 2. Dilatation of bowel loops proximal to this, likely partial small bowel obstruction.  Contrast does progress to the level of the rectum. 3. Increased ascites, small volume. 4. Right small pleural effusion. Mild bibasilar lung infiltrates versus atelectasis. Ct Abd Pelv W Cont    Result Date: 4/26/2021  1. Nonspecific appearance of several small and large bowel loops, possibly infection or inflammation. No high-grade obstruction. 2. No acute abnormality elsewhere. EKG:  No results found for this or any previous visit.           Penny Montes MD  4/30/2021 3:34 PM

## 2021-04-30 NOTE — PROGRESS NOTES
Nutrition Note    Diet education (Hospitalist): Low Sediment . Admitted with partial bowel obstruction    Diet: DIET REGULAR Low Fiber  Started at breakfast this morning. Provided pt with handout on low fiber diet.  at bedside. Requested pt review handout and RD will return for additonal review and questions.   Will add NCS to current diet per pt request d/t hx of DM      Electronically signed by Nora Dennis RD on 4/30/2021 at 9:50 AM    Contact: [phone]

## 2021-04-30 NOTE — PROGRESS NOTES
Problem: Mobility Impaired (Adult and Pediatric)  Goal: *Acute Goals and Plan of Care (Insert Text)  Description: STG:  (1.)Ms. Ton Kelly will move from supine to sit and sit to supine  with STAND BY ASSIST within 4-7 treatment day(s). (2.)Ms. Ton Kelly will transfer from bed to chair and chair to bed with STAND BY ASSIST using the least restrictive device within 4-7 treatment day(s). (3.)Ms. Ton Kelly will ambulate with STAND BY ASSIST for 400 feet with the least restrictive device within 4-7 treatment day(s). ________________________________________________________________________________________________      Outcome: Progressing Towards Goal     PHYSICAL THERAPY: Daily Note and AM 4/30/2021  INPATIENT: PT Visit Days : 2  [place] / [place] / Product Type: Medicare /       NAME/AGE/GENDER: Renato Ren is a 68 y.o. female   PRIMARY DIAGNOSIS: Acute gastroenteritis [K52.9] Acute gastroenteritis Acute gastroenteritis       ICD-10: Treatment Diagnosis:    · Generalized Muscle Weakness (M62.81)  · Difficulty in walking, Not elsewhere classified (R26.2)   Precaution/Allergies:  Lortab [hydrocodone-acetaminophen] and Acetaminophen      ASSESSMENT:     Ms. Ton Kelly presents with generalized weakness and decreased independence with functional mobility. She was admitted with above diagnosis and is supine on contact with spouse at bedside. She is normally independent with mobility without assistive devices and had been walking around 3 miles every day. On assessment she walked into bathroom, she is unsteady on her feet and would be unsafe to be up without assist. Worked on walking in the hallway with hand held assist and pt walked 200 feet with hand held assist. On return to room she returned supine with needs in reach and family remains at bedside.  She may benefit from a walker to use for stability and would benefit from HHPT for follow up when she is discharge from the hospital.  4/30 supine upon arrival.  Performs B UE/LE exercises with guidance. Bed mobility as follows: supine>EOB with CGA. Walk 200 ft using HHA with no LOB. Return to supine with needs in reach. This section established at most recent assessment   PROBLEM LIST (Impairments causing functional limitations):  1. Decreased Strength  2. Decreased ADL/Functional Activities  3. Decreased Transfer Abilities  4. Decreased Ambulation Ability/Technique  5. Decreased Balance  6. Decreased Activity Tolerance   INTERVENTIONS PLANNED: (Benefits and precautions of physical therapy have been discussed with the patient.)  1. Balance Exercise  2. Bed Mobility  3. Gait Training  4. Therapeutic Activites  5. Therapeutic Exercise/Strengthening  6. Transfer Training     TREATMENT PLAN: Frequency/Duration: daily for duration of hospital stay  Rehabilitation Potential For Stated Goals: Good     REHAB RECOMMENDATIONS (at time of discharge pending progress):    Placement: It is my opinion, based on this patient's performance to date, that Ms. Milvia Ruff may benefit from [address] after discharge due to the functional deficits listed above that are likely to improve with skilled rehabilitation because he/she has multiple medical issues that affect his/her functional mobility in the community. Equipment:    To be determined, may need a rolling walker              HISTORY:   History of Present Injury/Illness (Reason for Referral):  PER MD NOTE: Gurmeet Rodney is a 68 y.o. female, with a history of  has a past medical history of DDD (degenerative disc disease), lumbar (2/19/2020), DM2 (diabetes mellitus, type 2) ([address] (3/4/2014), Gastroesophageal reflux disease without esophagitis (5/22/2015), HLD (hyperlipidemia), HTN (hypertension), Menopause, Sciatica of right side (2/19/2020), and UTI (urinary tract infection) (4/27/2021).  She also has no past medical history of Asthma, Autoimmune disease ([address] CAD (coronary artery disease), Cancer ([address] Chronic kidney disease, COPD, Dementia, Heart failure ([address] Liver disease, Other ill-defined conditions(799[address]89), Psychiatric disorder, PUD (peptic ulcer disease), Seizures ([address] Sleep disorder, Stroke ([address] or Thromboembolus ([address] ,  has a past surgical history that includes hx hysterectomy; hx partial thyroidectomy; hx other surgical; hx colonoscopy (01/25/2010); and neurological procedure unlisted (2014). who presents to the ER with report of two days of persistent lower abdominal pain, nausea, and vomiting. She was seen in the ER yesterday and was discharged home with diagnosis of UTI after labs and CT abd/pelvis were unremarkable for admitting diagnosis. Pt reports persistent nausea and vomiting since that time and was unable to keep down Keflex that was prescribed for the UTI. Denies any fevers, chills, diarrhea. Past Medical History/Comorbidities:   Ms. Edna Owen  has a past medical history of DDD (degenerative disc disease), lumbar (2/19/2020), DM2 (diabetes mellitus, type 2) ([address] (3/4/2014), Gastroesophageal reflux disease without esophagitis (5/22/2015), HLD (hyperlipidemia), HTN (hypertension), Menopause, Sciatica of right side (2/19/2020), and UTI (urinary tract infection) (4/27/2021). She also has no past medical history of Asthma, Autoimmune disease ([address] CAD (coronary artery disease), Cancer ([address] Chronic kidney disease, COPD, Dementia, Heart failure ([address] Liver disease, Other ill-defined conditions([address]), Psychiatric disorder, PUD (peptic ulcer disease), Seizures ([address] Sleep disorder, Stroke ([address] or Thromboembolus ([address] Ms. Edna Owen  has a past surgical history that includes hx hysterectomy; hx partial thyroidectomy; hx other surgical; hx colonoscopy (01/25/2010); and neurological procedure unlisted (2014).   Social History/Living Environment:   Home Environment: Private residence  One/Two Story Residence: Two story  Living Alone: No  Support Systems: Spouse/Significant Other/Partner  Patient Expects to be Discharged to[de-identified] Private residence  Current DME Used/Available at Home: None  Prior Level of Function/Work/Activity:  Pt lives at home with spouse, independent with mobility without assistive devices     Number of Personal Factors/Comorbidities that affect the Plan of Care: 0: LOW COMPLEXITY   EXAMINATION:   Most Recent Physical Functioning:   Gross Assessment:                  Posture:     Balance:  Sitting: Intact  Standing: Pull to stand; With support Bed Mobility:  Supine to Sit: Contact guard assistance  Sit to Supine: Contact guard assistance  Wheelchair Mobility:     Transfers:  Sit to Stand: Minimum assistance  Stand to Sit: Minimum assistance  Bed to Chair: Minimum assistance  Duration: 30 Minutes  Gait:     Speed/Sunshine: Pace decreased (<100 feet/min)  Step Length: Left shortened;Right shortened  Gait Abnormalities: Antalgic  Distance (ft): 200 Feet (ft)  Assistive Device: (HHA)  Ambulation - Level of Assistance: Minimal assistance  Interventions: Safety awareness training      Body Structures Involved:  1. Muscles Body Functions Affected:  1. Movement Related Activities and Participation Affected:  1. Mobility  2. Self Care   Number of elements that affect the Plan of Care: 4+: HIGH COMPLEXITY   CLINICAL PRESENTATION:   Presentation: Stable and uncomplicated: LOW COMPLEXITY   CLINICAL DECISION MAKIN [place] [id number] AM-PAC 6 Clicks   Basic Mobility Inpatient Short Form  How much difficulty does the patient currently have. .. Unable A Lot A Little None   1. Turning over in bed (including adjusting bedclothes, sheets and blankets)? [] 1   [] 2   [x] 3   [] 4   2. Sitting down on and standing up from a chair with arms ( e.g., wheelchair, bedside commode, etc.)   [] 1   [] 2   [x] 3   [] 4   3. Moving from lying on back to sitting on the side of the bed? [] 1   [] 2   [x] 3   [] 4   How much help from another person does the patient currently need. .. Total A Lot A Little None   4.   Moving to and from a bed to a chair (including a wheelchair)? [] 1   [] 2   [x] 3   [] 4   5. Need to walk in hospital room? [] 1   [] 2   [x] 3   [] 4   6. Climbing 3-5 steps with a railing? [] 1   [] 2   [x] 3   [] 4   © 2007, Trustees of [facility], under license to [employer]. All rights reserved      Score:  Initial: 18 Most Recent: X (Date: -- )    Interpretation of Tool:  Represents activities that are increasingly more difficult (i.e. Bed mobility, Transfers, Gait). Medical Necessity:     · Patient is expected to demonstrate progress in   · strength and functional technique  ·  to   · decrease assistance required with functional mobility  · . Reason for Services/Other Comments:  · Patient continues to require skilled intervention due to   · Inability to complete functional mobility independently  · . Use of outcome tool(s) and clinical judgement create a POC that gives a: Clear prediction of patient's progress: LOW COMPLEXITY            TREATMENT:   (In addition to Assessment/Re-Assessment sessions the following treatments were rendered)   Pre-treatment Symptoms/Complaints:  agreeable  Pain: Initial:   Pain Intensity 1: 1  Post Session:  0/10   Assessment   Gait Training ( ):  Gait training to improve and/or restore physical functioning as related to mobility and strength. Ambulated 200 Feet (ft) with Minimal assistance using a (HHA) and minimal Safety awareness training related to their stance phase and stride length to promote proper body alignment and promote proper body posture.   Therapeutic Activity: (  30 Minutes ):  Therapeutic activities including Bed transfers, Ambulation on level ground and and performs exercises with guidance   Date:  4/30 Date:   Date:     Activity/Exercise Parameters Parameters Parameters   Ankle puumps 12     Quad ssets 12     heelslides 12     Hip abd/add 12     SAQ 12                     Braces/Orthotics/Lines/Etc:   · O2 Device: None (Room air)  Treatment/Session Assessment:    · Response to Treatment:  Making good progress  · Interdisciplinary Collaboration:   o Physical Therapy Assistant  o Registered Nurse  · After treatment position/precautions:   o Supine in bed  o Bed/Chair-wheels locked  o Bed in low position  o Call light within reach  o Family at bedside   · Compliance with Program/Exercises: Compliant all of the time, Will assess as treatment progresses  · Recommendations/Intent for next treatment session: \"Next visit will focus on advancements to more challenging activities and reduction in assistance provided\".   Total Treatment Duration:  PT Patient Time In/Time Out  Time In: 1125  Time Out: Amara Ferro [address], PTA

## 2021-05-01 ENCOUNTER — APPOINTMENT (OUTPATIENT)
Dept: GENERAL RADIOLOGY | Age: 81
DRG: 389 | End: 2021-05-01
Attending: INTERNAL MEDICINE
Payer: MEDICARE

## 2021-05-01 PROBLEM — E83.42 HYPOMAGNESEMIA: Status: ACTIVE | Noted: 2021-05-01

## 2021-05-01 PROBLEM — K56.609 SBO (SMALL BOWEL OBSTRUCTION) (HCC): Status: ACTIVE | Noted: 2021-04-27

## 2021-05-01 LAB
ALBUMIN SERPL-MCNC: 2.6 G/DL (ref 3.2–4.6)
ALBUMIN/GLOB SERPL: 1 {RATIO} (ref 1.2–3.5)
ALP SERPL-CCNC: 41 U/L (ref 50–130)
ALT SERPL-CCNC: 11 U/L (ref 12–65)
ANION GAP SERPL CALC-SCNC: 7 MMOL/L (ref 7–16)
AST SERPL-CCNC: 8 U/L (ref 15–37)
BASOPHILS # BLD: 0 K/UL (ref 0–0.2)
BASOPHILS NFR BLD: 0 % (ref 0–2)
BILIRUB SERPL-MCNC: 0.4 MG/DL (ref 0.2–1.1)
BUN SERPL-MCNC: 13 MG/DL (ref 8–23)
CALCIUM SERPL-MCNC: 8 MG/DL (ref 8.3–10.4)
CHLORIDE SERPL-SCNC: 102 MMOL/L (ref 98–107)
CO2 SERPL-SCNC: 26 MMOL/L (ref 21–32)
CREAT SERPL-MCNC: 0.49 MG/DL (ref 0.6–1)
DIFFERENTIAL METHOD BLD: ABNORMAL
EOSINOPHIL # BLD: 0.1 K/UL (ref 0–0.8)
EOSINOPHIL NFR BLD: 2 % (ref 0.5–7.8)
ERYTHROCYTE [DISTWIDTH] IN BLOOD BY AUTOMATED COUNT: 11.9 % (ref 11.9–14.6)
GLOBULIN SER CALC-MCNC: 2.6 G/DL (ref 2.3–3.5)
GLUCOSE BLD STRIP.AUTO-MCNC: 116 MG/DL (ref 65–100)
GLUCOSE BLD STRIP.AUTO-MCNC: 116 MG/DL (ref 65–100)
GLUCOSE BLD STRIP.AUTO-MCNC: 157 MG/DL (ref 65–100)
GLUCOSE BLD STRIP.AUTO-MCNC: 169 MG/DL (ref 65–100)
GLUCOSE SERPL-MCNC: 195 MG/DL (ref 65–100)
HCT VFR BLD AUTO: 34.4 % (ref 35.8–46.3)
HGB BLD-MCNC: 12.2 G/DL (ref 11.7–15.4)
IMM GRANULOCYTES # BLD AUTO: 0 K/UL (ref 0–0.5)
IMM GRANULOCYTES NFR BLD AUTO: 0 % (ref 0–5)
LYMPHOCYTES # BLD: 0.9 K/UL (ref 0.5–4.6)
LYMPHOCYTES NFR BLD: 11 % (ref 13–44)
MAGNESIUM SERPL-MCNC: 1.6 MG/DL (ref 1.8–2.4)
MCH RBC QN AUTO: 33.3 PG (ref 26.1–32.9)
MCHC RBC AUTO-ENTMCNC: 35.5 G/DL (ref 31.4–35)
MCV RBC AUTO: 94 FL (ref 79.6–97.8)
MONOCYTES # BLD: 0.8 K/UL (ref 0.1–1.3)
MONOCYTES NFR BLD: 9 % (ref 4–12)
NEUTS SEG # BLD: 6.2 K/UL (ref 1.7–8.2)
NEUTS SEG NFR BLD: 77 % (ref 43–78)
NRBC # BLD: 0 K/UL (ref 0–0.2)
PLATELET # BLD AUTO: 210 K/UL (ref 150–450)
PMV BLD AUTO: 9.9 FL (ref 9.4–12.3)
POTASSIUM SERPL-SCNC: 3.3 MMOL/L (ref 3.5–5.1)
PROT SERPL-MCNC: 5.2 G/DL (ref 6.3–8.2)
RBC # BLD AUTO: 3.66 M/UL (ref 4.05–5.2)
SERVICE CMNT-IMP: ABNORMAL
SODIUM SERPL-SCNC: 135 MMOL/L (ref 136–145)
WBC # BLD AUTO: 8 K/UL (ref 4.3–11.1)

## 2021-05-01 PROCEDURE — 74011636637 HC RX REV CODE- 636/637: Performed by: FAMILY MEDICINE

## 2021-05-01 PROCEDURE — 2709999900 HC NON-CHARGEABLE SUPPLY

## 2021-05-01 PROCEDURE — 74018 RADEX ABDOMEN 1 VIEW: CPT

## 2021-05-01 PROCEDURE — 85025 COMPLETE CBC W/AUTO DIFF WBC: CPT

## 2021-05-01 PROCEDURE — 82962 GLUCOSE BLOOD TEST: CPT

## 2021-05-01 PROCEDURE — 77030008771 HC TU NG SALEM SUMP -A

## 2021-05-01 PROCEDURE — 74011250637 HC RX REV CODE- 250/637: Performed by: INTERNAL MEDICINE

## 2021-05-01 PROCEDURE — 80053 COMPREHEN METABOLIC PANEL: CPT

## 2021-05-01 PROCEDURE — C9113 INJ PANTOPRAZOLE SODIUM, VIA: HCPCS | Performed by: HOSPITALIST

## 2021-05-01 PROCEDURE — 74011000258 HC RX REV CODE- 258: Performed by: INTERNAL MEDICINE

## 2021-05-01 PROCEDURE — 83735 ASSAY OF MAGNESIUM: CPT

## 2021-05-01 PROCEDURE — 74011250636 HC RX REV CODE- 250/636: Performed by: HOSPITALIST

## 2021-05-01 PROCEDURE — 74011250636 HC RX REV CODE- 250/636: Performed by: INTERNAL MEDICINE

## 2021-05-01 PROCEDURE — 0D9670Z DRAINAGE OF STOMACH WITH DRAINAGE DEVICE, VIA NATURAL OR ARTIFICIAL OPENING: ICD-10-PCS | Performed by: INTERNAL MEDICINE

## 2021-05-01 PROCEDURE — 74011000250 HC RX REV CODE- 250: Performed by: HOSPITALIST

## 2021-05-01 PROCEDURE — 74019 RADEX ABDOMEN 2 VIEWS: CPT

## 2021-05-01 PROCEDURE — 36415 COLL VENOUS BLD VENIPUNCTURE: CPT

## 2021-05-01 PROCEDURE — 74011250636 HC RX REV CODE- 250/636: Performed by: FAMILY MEDICINE

## 2021-05-01 PROCEDURE — 99223 1ST HOSP IP/OBS HIGH 75: CPT | Performed by: SURGERY

## 2021-05-01 PROCEDURE — 74011636637 HC RX REV CODE- 636/637: Performed by: INTERNAL MEDICINE

## 2021-05-01 PROCEDURE — 65270000029 HC RM PRIVATE

## 2021-05-01 RX ORDER — HYDRALAZINE HYDROCHLORIDE 20 MG/ML
10 INJECTION INTRAMUSCULAR; INTRAVENOUS
Status: DISCONTINUED | OUTPATIENT
Start: 2021-05-01 | End: 2021-05-01

## 2021-05-01 RX ORDER — SODIUM CHLORIDE, SODIUM LACTATE, POTASSIUM CHLORIDE, CALCIUM CHLORIDE 600; 310; 30; 20 MG/100ML; MG/100ML; MG/100ML; MG/100ML
75 INJECTION, SOLUTION INTRAVENOUS CONTINUOUS
Status: DISCONTINUED | OUTPATIENT
Start: 2021-05-01 | End: 2021-05-08

## 2021-05-01 RX ORDER — MAGNESIUM SULFATE HEPTAHYDRATE 40 MG/ML
2 INJECTION, SOLUTION INTRAVENOUS ONCE
Status: COMPLETED | OUTPATIENT
Start: 2021-05-01 | End: 2021-05-01

## 2021-05-01 RX ORDER — AMLODIPINE BESYLATE 5 MG/1
5 TABLET ORAL DAILY
Status: DISCONTINUED | OUTPATIENT
Start: 2021-05-02 | End: 2021-05-04

## 2021-05-01 RX ORDER — HYDRALAZINE HYDROCHLORIDE 20 MG/ML
5 INJECTION INTRAMUSCULAR; INTRAVENOUS
Status: DISCONTINUED | OUTPATIENT
Start: 2021-05-01 | End: 2021-05-02

## 2021-05-01 RX ORDER — INSULIN LISPRO 100 [IU]/ML
INJECTION, SOLUTION INTRAVENOUS; SUBCUTANEOUS
Status: DISCONTINUED | OUTPATIENT
Start: 2021-05-01 | End: 2021-05-08 | Stop reason: HOSPADM

## 2021-05-01 RX ORDER — POTASSIUM CHLORIDE 14.9 MG/ML
20 INJECTION INTRAVENOUS
Status: COMPLETED | OUTPATIENT
Start: 2021-05-01 | End: 2021-05-01

## 2021-05-01 RX ORDER — KETOROLAC TROMETHAMINE 15 MG/ML
7.5 INJECTION, SOLUTION INTRAMUSCULAR; INTRAVENOUS
Status: DISPENSED | OUTPATIENT
Start: 2021-05-01 | End: 2021-05-05

## 2021-05-01 RX ORDER — MAGNESIUM SULFATE HEPTAHYDRATE 40 MG/ML
2 INJECTION, SOLUTION INTRAVENOUS ONCE
Status: DISCONTINUED | OUTPATIENT
Start: 2021-05-01 | End: 2021-05-01 | Stop reason: SDUPTHER

## 2021-05-01 RX ADMIN — Medication 10 ML: at 21:40

## 2021-05-01 RX ADMIN — ENOXAPARIN SODIUM 30 MG: 100 INJECTION SUBCUTANEOUS at 08:48

## 2021-05-01 RX ADMIN — HYDRALAZINE HYDROCHLORIDE 10 MG: 20 INJECTION INTRAMUSCULAR; INTRAVENOUS at 13:09

## 2021-05-01 RX ADMIN — INSULIN HUMAN 3 UNITS: 100 INJECTION, SOLUTION PARENTERAL at 08:48

## 2021-05-01 RX ADMIN — KETOROLAC TROMETHAMINE 15 MG: 15 INJECTION, SOLUTION INTRAMUSCULAR; INTRAVENOUS at 14:51

## 2021-05-01 RX ADMIN — INSULIN LISPRO 2 UNITS: 100 INJECTION, SOLUTION INTRAVENOUS; SUBCUTANEOUS at 13:05

## 2021-05-01 RX ADMIN — KETOROLAC TROMETHAMINE 15 MG: 15 INJECTION, SOLUTION INTRAMUSCULAR; INTRAVENOUS at 00:53

## 2021-05-01 RX ADMIN — SODIUM CHLORIDE 40 MG: 9 INJECTION, SOLUTION INTRAMUSCULAR; INTRAVENOUS; SUBCUTANEOUS at 06:23

## 2021-05-01 RX ADMIN — HYDRALAZINE HYDROCHLORIDE 5 MG: 20 INJECTION INTRAMUSCULAR; INTRAVENOUS at 19:59

## 2021-05-01 RX ADMIN — DIPHENHYDRAMINE HYDROCHLORIDE 12.5 MG: 50 INJECTION, SOLUTION INTRAMUSCULAR; INTRAVENOUS at 21:45

## 2021-05-01 RX ADMIN — SODIUM CHLORIDE 40 MG: 9 INJECTION, SOLUTION INTRAMUSCULAR; INTRAVENOUS; SUBCUTANEOUS at 16:56

## 2021-05-01 RX ADMIN — ONDANSETRON 4 MG: 2 INJECTION INTRAMUSCULAR; INTRAVENOUS at 16:53

## 2021-05-01 RX ADMIN — Medication 10 ML: at 14:00

## 2021-05-01 RX ADMIN — POTASSIUM CHLORIDE 20 MEQ: 14.9 INJECTION, SOLUTION INTRAVENOUS at 13:05

## 2021-05-01 RX ADMIN — POTASSIUM BICARBONATE 40 MEQ: 391 TABLET, EFFERVESCENT ORAL at 13:06

## 2021-05-01 RX ADMIN — MAGNESIUM SULFATE HEPTAHYDRATE 2 G: 40 INJECTION, SOLUTION INTRAVENOUS at 17:45

## 2021-05-01 RX ADMIN — POTASSIUM CHLORIDE 20 MEQ: 14.9 INJECTION, SOLUTION INTRAVENOUS at 17:00

## 2021-05-01 RX ADMIN — PHENOL 1 SPRAY: 1.5 LIQUID ORAL at 18:20

## 2021-05-01 RX ADMIN — CEFTRIAXONE 1 G: 1 INJECTION, POWDER, FOR SOLUTION INTRAMUSCULAR; INTRAVENOUS at 17:00

## 2021-05-01 RX ADMIN — SODIUM CHLORIDE, SODIUM LACTATE, POTASSIUM CHLORIDE, AND CALCIUM CHLORIDE 75 ML/HR: 600; 310; 30; 20 INJECTION, SOLUTION INTRAVENOUS at 13:05

## 2021-05-01 RX ADMIN — Medication 10 ML: at 06:24

## 2021-05-01 RX ADMIN — Medication 10 ML: at 16:54

## 2021-05-01 NOTE — PROGRESS NOTES
[facility] Hospitalist Service Progress Note    Briefly, 74-year-old female with significant past medical history of diabetes mellitus and GERD presented to the hospital with abdominal pain, nausea and vomiting. She was initially diagnosed with UTI and she was treated with ceftriaxone. In addition, her initial CT was concerning for small bowel obstruction which was reviewed by surgeon. Patient was treated for ileus and she had 2 bowel movement. May 1: Overnight patient developed severe abdominal pain and her abdominal distention got worse. She denies any nausea or vomiting at present. Denies any fever, chills, chest pain, palpitations. Rest review of system negative except mentioned above. Assessment / Plan:    Distended loops of bowel, possible small bowel obstruction  Initially treated for ileus. Now currently most likely SBO. General surgery consulted. NPO. NG tube with low intermittent suction. IV fluid hydration. Replete potassium for level greater than 4. TSH within normal limit. Conservative management. E. coli UTI: Patient was treated on ceftriaxone for 4 days but patient still symptomatic so we will give 3 more days of ceftriaxone. Diabetes mellitus type 2  Sliding scale insulin. Significantly elevated blood pressure: Hydralazine ordered. After hydralazine patient felt that her heart rate was racing and her heart rate was 84. We will decrease the dose of hydralazine. Amlodipine started. SCDs for DVT prophylaxis given unsure if patient will need surgery. Patient is AOx3. Patient  was present at bedside. Patient is of high complexity given acute worsening in her condition and leading to diagnosis of small bowel obstruction. Both verbalized understanding that patient condition meditated in spite of treatment.       Objective:  Visit Vitals  BP (!) 162/74 (BP 1 Location: Left arm, BP Patient Position: At rest)   Pulse 88   Temp 98 °F (36.7 °C)   Resp 18   Ht 5' 4\" (1.626 m)   Wt 60.8 kg (134 lb)   SpO2 96%   BMI 23.00 kg/m²                 Physical Exam:  General: Mildly ill-appearing owing to abdominal pain  HEENT: Extraocular muscle seems intact. Mucous membranes slightly dry.   Neck: Supple , no JVD   Lungs: Clear to auscultation bilaterally   Cardiovascular: Regular rate and rhythm with normal S1 and S2   Abdomen: Soft, distended with some tenderness, bowel sounds are hyperactive some  extremities: No cyanosis clubbing or edema   Neuro: Moving all 4 extremities, speech normal l, A&O x3   Psych: Normal affect   Procedures done this admission:  * No surgery found *    All Micro Results     Procedure Component Value Units Date/Time    CULTURE, BLOOD [[id number]] Collected: 04/27/21 1854    Order Status: Completed Specimen: Blood Updated: 05/01/21 0825     Special Requests: --        NO SPECIAL REQUESTS  LEFT  HAND       Culture result: NO GROWTH 4 DAYS       CULTURE, BLOOD [[id number]] Collected: 04/27/21 2036    Order Status: Completed Specimen: Blood Updated: 05/01/21 0825     Special Requests: --        RIGHT  Antecubital       Culture result: NO GROWTH 4 DAYS             SARS-CoV-2 Lab Results  \"Novel Coronavirus\" Test: No results found for: COV2NT   \"Emergent Disease\" Test: No results found for: EDPR  \"SARS-COV-2\" Test: No results found for: XGCOVT  \"Precision Labs\" Test: No results found for: RSLT  Rapid Test: No results found for: COVR         Labs: Results:       BMP, Mg, Phos Recent Labs     05/01/21 0755 04/30/21 0249 04/29/21 0413   * 135* 137   K 3.3* 4.5 3.8    105 104   CO2 26 25 26   AGAP 7 5* 7   BUN 13 13 25*   CREA 0.49* 0.44* 0.46*   CA 8.0* 8.4 8.5   * 129* 119*   MG 1.6*  --   --       CBC Recent Labs     05/01/21 0755 04/30/21 0249 04/29/21 0413   WBC 8.0 8.4 7.3   RBC 3.66* 3.68* 3.66*   HGB 12.2 12.2 12.3   HCT 34.4* 35.1* 34.6*    195 212   GRANS 77 71 68   LYMPH 11* 14 17   EOS 2 2 1   MONOS 9 12 13*   BASOS 0 1 0   IG 0 0 0   ANEU 6.2 6.0 5.0   ABL 0.9 1.1 1.2   TAMMY 0.1 0.2 0.1   ABM 0.8 1.0 1.0   ABB 0.0 0.0 0.0   AIG 0.0 0.0 0.0      LFT Recent Labs     05/01/21  0755 04/30/21  0249 04/29/21  0413   ALT 11* 11* <6*   AP 41* 39* 42*   TP 5.2* 5.5* 5.3*   ALB 2.6* 2.7* 2.9*   GLOB 2.6 2.8 2.4   AGRAT 1.0* 1.0* 1.2      Cardiac Testing Lab Results   Component Value Date/Time    Troponin-I <0.05 12/30/2010 05:07 AM    Troponin-I <0.05 12/30/2010 02:12 AM      Coagulation Tests No results found for: PTP, INR, APTT, INREXT   A1c Lab Results   Component Value Date/Time    Hemoglobin A1c 6.4 (H) 04/21/2021 10:34 AM    Hemoglobin A1c 6.7 (H) 10/02/2020 10:11 AM    Hemoglobin A1c 7.7 (H) 06/30/2020 12:00 PM      Lipid Panel Lab Results   Component Value Date/Time    Cholesterol, total 116 04/21/2021 10:34 AM    HDL Cholesterol 63 04/21/2021 10:34 AM    LDL, calculated 39 04/21/2021 10:34 AM    LDL, calculated 34 06/30/2020 12:00 PM    VLDL, calculated 14 04/21/2021 10:34 AM    VLDL, calculated 23 06/30/2020 12:00 PM    Triglyceride 69 04/21/2021 10:34 AM    CHOL/HDL Ratio 4.1 05/26/2016 08:58 AM      Thyroid Panel Lab Results   Component Value Date/Time    TSH 2.700 04/21/2021 10:34 AM    TSH 2.900 09/03/2019 08:46 AM        Most Recent UA Lab Results   Component Value Date/Time    Color Yellow 09/03/2019 08:46 AM    Appearance Clear 09/03/2019 08:46 AM    pH (UA) 5.0 06/07/2017 08:52 AM    Protein Negative 06/07/2017 08:52 AM    Glucose Negative 06/07/2017 08:52 AM    Ketone Negative 06/07/2017 08:52 AM    Bilirubin Negative 06/07/2017 08:52 AM    Blood Negative 06/07/2017 08:52 AM    Urobilinogen 0.2 E.U./dL 06/07/2017 08:52 AM    Nitrites Positive (A) 06/07/2017 08:52 AM    Leukocyte Esterase Trace (A) 06/07/2017 08:52 AM    WBC 5-10 04/26/2021 12:43 PM    RBC 0-3 04/26/2021 12:43 PM    Epithelial cells 0-3 04/26/2021 12:43 PM    Bacteria 4+ (H) 04/26/2021 12:43 PM    Casts 3-5 04/26/2021 12:43 PM          Gina Gordillo MD  5/1/2021

## 2021-05-01 NOTE — CONSULTS
H&P/Consult Note/Progress Note/Office Note:   Leonard Henderson  MRN: [id number]  SHA:9[birth year]51[birth year]28  Age:80 y.o.    HPI: Loenard Henderson is a 68 y.o. female who was admitted on 4/27/20 by the Hospitalists   after she presented to the ER a 2nd time in 2 days for with constant and progressive N/V/lower abd pain for 2 days. Nothing made symptoms better or worse. No associated fever  She is s/p hysterectomy approx 2011    CT on 4/28/21 showed thickening of focal small bowel wall with proximal dilation and likely partial SBO  Dr Maureen Ruano was consulted for SBO on 4/29/21. Her symptoms worsened today and she had the below abd Xray with AF levels and signs of persistent SBO       4/28/21 CT abd/pelvis with oral and IV contrast  Hx:  Follow-up of abnormal small bowel with subacute progressive worsening generalized abdominal distention and pain. Spontaneous bacterial peritonitis.      FINDINGS: Partially included lung bases demonstrate a new small posterior  layering pleural effusion on the right and mild infiltrates/atelectasis in both  posterior lower lungs. No dense consolidation. Nonspecific mild wall thickening  of distal esophagus and probable tiny hiatal hernia.     Abdomen: Unchanged indeterminate right adrenal nodule, most likely incidental  adenoma in the absence of clinical suspicion. The kidneys, adrenals, spleen,  liver, pancreas, gallbladder have not changed in 2 days. Right renal cyst again  noted. Diffuse atherosclerotic changes again noted of aorta. Contrast does  opacify major vessels. No evidence of free air, large volume ascites, or focal  fluid collection in the abdomen. There is increased tracer small volume of  abdominopelvic ascites.     GI tract demonstrates contrast from level of stomach to rectum. Stomach,  duodenum, large bowel, distal ileum are not distended. There is a focal right  lower quadrant small bowel loop with wall thickening and surrounding  inflammatory stranding.  Proximal to this there are multiple mildly dilated small  bowel loops which have slightly increased since prior. Nonspecific wall thickening is also noted scattered and several other small bowel loops. nura: No developing abscess, lymphadenopathy or mass. Small volume ascites.     Bones: No acute osseous lesions. IMPRESSION  1. Abnormal small bowel, most prominently involving focal wall thickening and inflammation in a right lower quadrant loop. 2. Dilatation of bowel loops proximal to this, likely partial small bowel obstruction. Contrast does progress to the level of the rectum. 3. Increased ascites, small volume. 4. Right small pleural effusion. Mild bibasilar lung infiltrates versus atelectasis        5/1/21 abd Xray, 2views  Hx:  Abdominal pain and bloating    Increased small bowel distention. Multiple air-fluid levels are now present. Contrast is present in the colon from prior CT scan. There is no free air. There are no renal calculi. The lung bases are clear.     IMPRESSION  Increased small bowel distention, likely high-grade obstruction           Past Medical History:   Diagnosis Date    DDD (degenerative disc disease), lumbar 2/19/2020    DM2 (diabetes mellitus, type 2) ([facility] [address] 3/4/2014    Gastroesophageal reflux disease without esophagitis 5/22/2015    HLD (hyperlipidemia)     HTN (hypertension)     Menopause     Sciatica of right side 2/19/2020    UTI (urinary tract infection) 4/27/2021     Past Surgical History:   Procedure Laterality Date    HX COLONOSCOPY  01/25/2010    HX HYSTERECTOMY      10 years ago    HX OTHER SURGICAL      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  HX PARTIAL THYROIDECTOMY      Lobectomy for benign mass.     NEUROLOGICAL PROCEDURE UNLISTED  2014    [facility]     Current Facility-Administered Medications   Medication Dose Route Frequency    [START ON 5/2/2021] amLODIPine (NORVASC) tablet 5 mg  5 mg Oral DAILY    insulin lispro (HUMALOG) injection   SubCUTAneous TIDAC    lactated Ringers infusion  75 mL/hr IntraVENous CONTINUOUS    hydrALAZINE (APRESOLINE) 20 mg/mL injection 5 mg  5 mg IntraVENous Q4H PRN    cefTRIAXone (ROCEPHIN) 1 g in 0.9% sodium chloride (MBP/ADV) 50 mL MBP  1 g IntraVENous Q24H    ketorolac (TORADOL) injection 7.5 mg  7.5 mg IntraVENous Q6H PRN    magnesium sulfate 2 g/50 ml IVPB (premix or compounded)  2 g IntraVENous ONCE    phenol throat spray (CHLORASEPTIC) 1 Spray  1 Spray Oral PRN    pantoprazole (PROTONIX) 40 mg in 0.9% sodium chloride 10 mL injection  40 mg IntraVENous Q12H    [Held by provider] enoxaparin (LOVENOX) injection 30 mg  30 mg SubCUTAneous DAILY    diphenhydrAMINE (BENADRYL) injection 12.5 mg  12.5 mg IntraVENous Q6H PRN    [Held by provider] metFORMIN (GLUCOPHAGE) tablet 1,000 mg  1,000 mg Oral BID WITH MEALS    rosuvastatin (CRESTOR) tablet 20 mg  20 mg Oral QHS    sodium chloride (NS) flush 5-40 mL  5-40 mL IntraVENous Q8H    sodium chloride (NS) flush 5-40 mL  5-40 mL IntraVENous PRN    acetaminophen (TYLENOL) tablet 650 mg  650 mg Oral Q6H PRN    Or    acetaminophen (TYLENOL) suppository 650 mg  650 mg Rectal Q6H PRN    polyethylene glycol (MIRALAX) packet 17 g  17 g Oral DAILY PRN    promethazine (PHENERGAN) tablet 12.5 mg  12.5 mg Oral Q6H PRN    Or    ondansetron (ZOFRAN) injection 4 mg  4 mg IntraVENous Q6H PRN     Lortab [hydrocodone-acetaminophen] and Acetaminophen  Social History     Socioeconomic History    Marital status:      Spouse name: Not on file    Number of children: Not on file    Years of education: Not on file    Highest education level: Not on file   Tobacco Use    Smoking status: Never Smoker    Smokeless tobacco: Never Used   Substance and Sexual Activity    Alcohol use: No    Drug use: No    Sexual activity: Yes     Partners: Male     Birth control/protection: Surgical   Other Topics Concern     Social History     Tobacco Use   Smoking Status Never Smoker   Smokeless Tobacco Never Used     Family History   Problem Relation Age of Onset    Heart Disease Father 76    Kidney Disease Father         hepatitis, post-surgery (tranfusion)    Cancer Mother [de-identified]        Breast    Breast Cancer Mother 80    Alzheimer Sister [de-identified]    Arthritis-osteo Sister     Arthritis-osteo Brother         Back surgery    Diabetes Neg Hx      ROS: The patient has no difficulty with chest pain or shortness of breath. No fever or chills. Comprehensive review of systems was otherwise unremarkable except as noted above. Physical Exam:   Visit Vitals  BP (!) 162/74 (BP 1 Location: Left arm, BP Patient Position: At rest)   Pulse 88   Temp 98 °F (36.7 °C)   Resp 18   Ht 5' 4\" (1.626 m)   Wt 134 lb (60.8 kg)   SpO2 96%   BMI 23.00 kg/m²     Vitals:    05/01/21 1309 05/01/21 1406 05/01/21 1415 05/01/21 1435   BP: (!) 170/79 (!) 177/71 (!) 183/70 (!) 162/74   Pulse: (!) 59   88   Resp:    18   Temp:       SpO2:    96%   Weight:       Height:         No intake/output data recorded. 04/29 1901 - 05/01 0700  In: 2197 [P.O.:120; I.V.:2077]  Out: -     Constitutional: Alert, oriented, cooperative patient in no acute distress; appears stated age    Eyes:Sclera are clear. EOMs intact  ENMT: no external lesions gross hearing normal; no obvious neck masses, no ear or lip lesions, nares normal  CV: RRR. Normal perfusion  Resp: No JVD. Breathing is  non-labored; no audible wheezing. GI:  Distended and tympanetic; no peritoneal signs     Musculoskeletal: unremarkable with normal function. No embolic signs or cyanosis.    Neuro:  Oriented; moves all 4; no focal deficits  Psychiatric: normal affect and mood, no memory impairment    Recent vitals (if inpt):  Patient Vitals for the past 24 hrs:   BP Temp Pulse Resp SpO2   05/01/21 1435 (!) 162/74  88 18 96 %   05/01/21 1415 (!) 183/70       05/01/21 1406 (!) 177/71       05/01/21 1309 (!) 170/79  (!) 59     05/01/21 1053 (!) 187/85 98 °F (36.7 °C) 72 19 98 %   05/01/21 0850 (!) 173/77 97.8 °F (36.6 °C) 66 20 98 %   05/01/21 0310 (!) 174/79 98.5 °F (36.9 °C) 64 18 96 %   04/30/21 2225 (!) 179/79 98.3 °F (36.8 °C) 65 18 93 %   04/30/21 1925 (!) 157/71 98.4 °F (36.9 °C) 75 20 92 %       Amount and/or Complexity of Data Reviewed and Analyzed:  I reviewed and analyzed all of the unique labs and radiologic studies that are shown below as well as any that are in the HPI, and any that are in the expanded problem list below  *Each unique test, order, or document contributes to the combination of 2 or combination of 3 in Category 1 below. For this visit I also reviewed old records and prior notes. Recent Labs     05/01/21  0755   WBC 8.0   HGB 12.2      *   K 3.3*      CO2 26   BUN 13   CREA 0.49*   *   TBILI 0.4   ALT 11*   AP 41*     Review of most recent CBC  Lab Results   Component Value Date/Time    WBC 8.0 05/01/2021 07:55 AM    HGB 12.2 05/01/2021 07:55 AM    HCT 34.4 (L) 05/01/2021 07:55 AM    PLATELET 653 16/64/2612 07:55 AM    MCV 94.0 05/01/2021 07:55 AM       Review of most recent BMP  Lab Results   Component Value Date/Time    Sodium 135 (L) 05/01/2021 07:55 AM    Potassium 3.3 (L) 05/01/2021 07:55 AM    Chloride 102 05/01/2021 07:55 AM    CO2 26 05/01/2021 07:55 AM    Anion gap 7 05/01/2021 07:55 AM    Glucose 195 (H) 05/01/2021 07:55 AM    BUN 13 05/01/2021 07:55 AM    Creatinine 0.49 (L) 05/01/2021 07:55 AM    BUN/Creatinine ratio 29 (H) 04/21/2021 10:34 AM    GFR est AA >60 05/01/2021 07:55 AM    GFR est non-AA >60 05/01/2021 07:55 AM    Calcium 8.0 (L) 05/01/2021 07:55 AM       Review of most recent LFTs (and lipase if done)  Lab Results   Component Value Date/Time    ALT (SGPT) 11 (L) 05/01/2021 07:55 AM    AST (SGOT) 8 (L) 05/01/2021 07:55 AM    Alk.  phosphatase 41 (L) 05/01/2021 07:55 AM    Bilirubin, direct 0.11 04/21/2021 10:34 AM    Bilirubin, total 0.4 05/01/2021 07:55 AM     Lab Results   Component Value Date/Time    Lipase 48 (L) 04/27/2021 05:54 PM       Lab Results   Component Value Date/Time    Bilirubin, direct 0.11 04/21/2021 10:34 AM    Troponin-I <0.05 12/30/2010 05:07 AM       Review of most recent HgbA1c  Lab Results   Component Value Date/Time    Hemoglobin A1c 6.4 (H) 04/21/2021 10:34 AM       Nutritional assessment screen for wound healing issues:  Lab Results   Component Value Date/Time    Protein, total 5.2 (L) 05/01/2021 07:55 AM    Albumin 2.6 (L) 05/01/2021 07:55 AM       @lastcovr@  XR Results (most recent):  Results from [place] encounter on 04/27/21   XR ABD (AP AND ERECT OR DECUB)    Narrative Two view abdomen    INDICATION:  Abdominal pain and bloating    Flat and upright views of the abdomen were obtained. FINDINGS: There is increased small bowel distention. Multiple air-fluid levels  are now present. Contrast is present in the colon from prior CT scan. There is  no free air. There are no renal calculi. The lung bases are clear. Impression Increased small bowel distention, likely high-grade obstruction         CT Results (most recent):  Results from Hospital Encounter encounter on 04/27/21   CT ABD PELV W CONT    Narrative CT of the Abdomen and Pelvis with contrast    CLINICAL INDICATION:  Follow-up of abnormal small bowel with subacute  progressive worsening generalized abdominal distention and pain. Spontaneous  bacterial peritonitis. COMPARISON: 4/26/2021    TECHNIQUE: Automated exposure Control was used. Multiple axial images were  obtained through the abdomen and pelvis after intravenous injection of 125cc of  isovue 370 IV contrast to further evaluate vessels and organs. Coronal  reformatted images were done for further evaluation of bones and organs. Oral  contrast was given for further evaluation of GI tract and adjacent organs.     FINDINGS: Partially included lung bases demonstrate a new small posterior  layering pleural effusion on the right and mild infiltrates/atelectasis in both  posterior lower lungs. No dense consolidation. Nonspecific mild wall thickening  of distal esophagus and probable tiny hiatal hernia. Abdomen: Unchanged indeterminate right adrenal nodule, most likely incidental  adenoma in the absence of clinical suspicion. The kidneys, adrenals, spleen,  liver, pancreas, gallbladder have not changed in 2 days. Right renal cyst again  noted. Diffuse atherosclerotic changes again noted of aorta. Contrast does  opacify major vessels. No evidence of free air, large volume ascites, or focal  fluid collection in the abdomen. There is increased tracer small volume of  abdominopelvic ascites. GI tract demonstrates contrast from level of stomach to rectum. Stomach,  duodenum, large bowel, distal ileum are not distended. There is a focal right  lower quadrant small bowel loop with wall thickening and surrounding  inflammatory stranding. Proximal to this there are multiple mildly dilated small  bowel loops which have slightly increased since prior. Nonspecific wall  thickening is also noted scattered and several other small bowel loops. Pelvis: No developing abscess, lymphadenopathy or mass. Small volume ascites. Bones: No acute osseous lesions. Impression 1. Abnormal small bowel, most prominently involving focal wall thickening and  inflammation in a right lower quadrant loop. 2. Dilatation of bowel loops proximal to this, likely partial small bowel  obstruction. Contrast does progress to the level of the rectum. 3. Increased ascites, small volume. 4. Right small pleural effusion. Mild bibasilar lung infiltrates versus  atelectasis. US Results (most recent):  No results found for this or any previous visit.       Admission date (for inpatients): 4/27/2021   * No surgery found *  * No surgery found *        ASSESSMENT/PLAN:  Problem List  Date Reviewed: 4/21/2021          Codes Class Noted    Hypomagnesemia ICD-10-CM: E83.42  ICD-9-CM: 275.2  5/1/2021 * (Principal) SBO (small bowel obstruction) ([facility] ICD-10-CM: M15.371  ICD-9-CM: 560.9  4/27/2021        Leukocytosis ICD-10-CM: J61.168  ICD-9-CM: 288.60  4/27/2021        UTI (urinary tract infection) ICD-10-CM: N39.0  ICD-9-CM: 599.0  4/27/2021        Lumbar radiculopathy ICD-10-CM: M54.16  ICD-9-CM: 724.4  2/19/2020        DDD (degenerative disc disease), lumbar ICD-10-CM: M51.36  ICD-9-CM: 722.52  2/19/2020        Gastroesophageal reflux disease without esophagitis ICD-10-CM: K21.9  ICD-9-CM: 530.81  6/7/2017        Type 2 diabetes mellitus without complication, without long-term current use of insulin ([facility] ICD-10-CM: E11.9  ICD-9-CM: 250.00  11/29/2016        Mixed hyperlipidemia ICD-10-CM: E78.2  ICD-9-CM: 272.2  11/29/2016        Osteopenia of multiple sites ICD-10-CM: M85.89  ICD-9-CM: 733.90  11/29/2016    Overview Signed 4/21/2021  8:19 AM by Carolyn Jimenez MD     Not compliant with multiple BMD orders.               Essential hypertension ICD-10-CM: I10  ICD-9-CM: 401.9  2/23/2016            Principal Problem:    SBO (small bowel obstruction) ([facility] (4/27/2021)    Active Problems:    Essential hypertension (2/23/2016)      Type 2 diabetes mellitus without complication, without long-term current use of insulin ([facility] [address] (11/29/2016)      Mixed hyperlipidemia (11/29/2016)      Gastroesophageal reflux disease without esophagitis (6/7/2017)      Leukocytosis (4/27/2021)      UTI (urinary tract infection) (4/27/2021)      Hypomagnesemia (5/1/2021)           Number and Complexity of Problems addressed and   Risks of complications and/or morbidity of management      Partial SBO  Continue inpt admission  NPO  NGT  IVF  Serial exam    Case discussed with Hospitalist on call  I will inform Dr Mikayla Almaraz Monday or operate sooner if peritonitis  Watch renal function daily with IV meds and hydration    Hypomagnesemia  Replace Mg++      Level of MDM (2/3 elements below)  Number and Complexity of Problems Addressed Amount and/or Complexity of Data to be Reviewed and Analyzed  *Each unique test, order, or document contributes to the combination of 2 or combination of 3 in Category 1 below.  Risk of Complications and/or Morbidity or Mortality of pt Management     76556  53732 SF Minimal  1self-limited or minor problem Minimal or none Minimal risk of morbidity from additional diagnostic testing or Rx   45454  67127 Low Low  2or more self-limited or minor problems;    or  1stable chronic illness;    or  5AZCCW, uncomplicated illness or injury   Limited  (Must meet the requirements of at least 1 of the 2 categories)  Category 1: Tests and documents   Any combination of 2 from the following:  Review of prior external note(s) from each unique source*;  review of the result(s) of each unique test*;   ordering of each unique test*    or   Category 2: Assessment requiring an independent historian(s)  (For the categories of independent interpretation of tests and discussion of management or test interpretation, see moderate or high) Low risk of morbidity from additional diagnostic testing or treatment     18447  80273 Mod Moderate  1or more chronic illnesses with exacerbation, progression, or side effects of treatment;    or  2or more stable chronic illnesses;    or  1undiagnosed new problem with uncertain prognosis;    or  1acute illness with systemic symptoms;    or  1NVXBD complicated injury   Moderate  (Must meet the requirements of at least 1 out of 3 categories)  Category 1: Tests, documents, or independent historian(s)  Any combination of 3 from the following:   Review of prior external note(s) from each unique source*;  Review of the result(s) of each unique test*;  Ordering of each unique test*;  Assessment requiring an independent historian(s)    or  Category 2: Independent interpretation of tests   Independent interpretation of a test performed by another physician/other qualified health care professional (not separately reported);     or  Category 3: Discussion of management or test interpretation  Discussion of management or test interpretation with external physician/other qualified health care professional/appropriate source (not separately reported)   Moderate risk of morbidity from additional diagnostic testing or treatment  Examples only:  Prescription drug management   Decision regarding minor surgery with identified patient or procedure risk factors  Decision regarding elective major surgery without identified patient or procedure risk factors   Diagnosis or treatment significantly limited by social determinants of health       26612  74533 High High  1or more chronic illnesses with severe exacerbation, progression, or side effects of treatment;    or  1 acute or chronic illness or injury that poses a threat to life or bodily function   Extensive  (Must meet the requirements of at least 2 out of 3 categories)  Category 1: Tests, documents, or independent historian(s)  Any combination of 3 from the following:   Review of prior external note(s) from each unique source*;  Review of the result(s) of each unique test*;   Ordering of each unique test*;   Assessment requiring an independent historian(s)    or   Category 2: Independent interpretation of tests   Independent interpretation of a test performed by another physician/other qualified health care professional (not separately reported);     or  Category 3: Discussion of management or test interpretation  Discussion of management or test interpretation with external physician/other qualified health care professional/appropriate source (not separately reported)   High risk of morbidity from additional diagnostic testing or treatment  Examples only:  Drug therapy requiring intensive monitoring for toxicity  Decision regarding elective major surgery with identified patient or procedure risk factors  Decision regarding emergency major surgery  Decision regarding hospitalization  Decision not to resuscitate or to de-escalate care because of poor prognosis             I have personally performed a face-to-face diagnostic evaluation and management  service on this patient. I have independently seen the patient. I have independently obtained the above history from the patient/family. I have independently examined the patient with above findings. I have independently reviewed data/labs for this patient and developed the above plan of care (MDM). Signed: Particia Raw.  Vikki Nguyen MD, FACS

## 2021-05-01 NOTE — PROGRESS NOTES
RN requested to hold PT today due to new NG tube placement & Pt needing to go to X-ray 1st to see if tube is placed correctly & secure. PT will follow up 5/2/21.  Viktoriya Mills, PT,HEATH

## 2021-05-01 NOTE — PROGRESS NOTES
Problem: Falls - Risk of  Goal: *Absence of Falls  Description: Document [place] Fall Risk and appropriate interventions in the flowsheet.   Outcome: Progressing Towards Goal  Note: Fall Risk Interventions:  Mobility Interventions: Bed/chair exit alarm         Medication Interventions: Patient to call before getting OOB    Elimination Interventions: Call light in reach              Problem: Patient Education: Go to Patient Education Activity  Goal: Patient/Family Education  Outcome: Progressing Towards Goal     Problem: Patient Education: Go to Patient Education Activity  Goal: Patient/Family Education  Outcome: Progressing Towards Goal

## 2021-05-02 ENCOUNTER — APPOINTMENT (OUTPATIENT)
Dept: GENERAL RADIOLOGY | Age: 81
DRG: 389 | End: 2021-05-02
Attending: INTERNAL MEDICINE
Payer: MEDICARE

## 2021-05-02 LAB
ALBUMIN SERPL-MCNC: 2.9 G/DL (ref 3.2–4.6)
ALBUMIN/GLOB SERPL: 1.2 {RATIO} (ref 1.2–3.5)
ALP SERPL-CCNC: 46 U/L (ref 50–130)
ALT SERPL-CCNC: 19 U/L (ref 12–65)
ANION GAP SERPL CALC-SCNC: 10 MMOL/L (ref 7–16)
AST SERPL-CCNC: 18 U/L (ref 15–37)
BACTERIA SPEC CULT: NORMAL
BACTERIA SPEC CULT: NORMAL
BASOPHILS # BLD: 0 K/UL (ref 0–0.2)
BASOPHILS NFR BLD: 0 % (ref 0–2)
BILIRUB SERPL-MCNC: 0.5 MG/DL (ref 0.2–1.1)
BUN SERPL-MCNC: 12 MG/DL (ref 8–23)
CALCIUM SERPL-MCNC: 8.1 MG/DL (ref 8.3–10.4)
CHLORIDE SERPL-SCNC: 104 MMOL/L (ref 98–107)
CO2 SERPL-SCNC: 22 MMOL/L (ref 21–32)
CREAT SERPL-MCNC: 0.45 MG/DL (ref 0.6–1)
DIFFERENTIAL METHOD BLD: ABNORMAL
EOSINOPHIL # BLD: 0.1 K/UL (ref 0–0.8)
EOSINOPHIL NFR BLD: 1 % (ref 0.5–7.8)
ERYTHROCYTE [DISTWIDTH] IN BLOOD BY AUTOMATED COUNT: 12.1 % (ref 11.9–14.6)
GLOBULIN SER CALC-MCNC: 2.5 G/DL (ref 2.3–3.5)
GLUCOSE BLD STRIP.AUTO-MCNC: 110 MG/DL (ref 65–100)
GLUCOSE BLD STRIP.AUTO-MCNC: 117 MG/DL (ref 65–100)
GLUCOSE BLD STRIP.AUTO-MCNC: 126 MG/DL (ref 65–100)
GLUCOSE BLD STRIP.AUTO-MCNC: 130 MG/DL (ref 65–100)
GLUCOSE SERPL-MCNC: 135 MG/DL (ref 65–100)
HCT VFR BLD AUTO: 38 % (ref 35.8–46.3)
HGB BLD-MCNC: 13.2 G/DL (ref 11.7–15.4)
IMM GRANULOCYTES # BLD AUTO: 0 K/UL (ref 0–0.5)
IMM GRANULOCYTES NFR BLD AUTO: 0 % (ref 0–5)
LYMPHOCYTES # BLD: 0.9 K/UL (ref 0.5–4.6)
LYMPHOCYTES NFR BLD: 7 % (ref 13–44)
MAGNESIUM SERPL-MCNC: 2.1 MG/DL (ref 1.8–2.4)
MCH RBC QN AUTO: 33.1 PG (ref 26.1–32.9)
MCHC RBC AUTO-ENTMCNC: 34.7 G/DL (ref 31.4–35)
MCV RBC AUTO: 95.2 FL (ref 79.6–97.8)
MONOCYTES # BLD: 1.2 K/UL (ref 0.1–1.3)
MONOCYTES NFR BLD: 9 % (ref 4–12)
NEUTS SEG # BLD: 10.8 K/UL (ref 1.7–8.2)
NEUTS SEG NFR BLD: 83 % (ref 43–78)
NRBC # BLD: 0 K/UL (ref 0–0.2)
PLATELET # BLD AUTO: 238 K/UL (ref 150–450)
PMV BLD AUTO: 9.5 FL (ref 9.4–12.3)
POTASSIUM SERPL-SCNC: 4 MMOL/L (ref 3.5–5.1)
PROT SERPL-MCNC: 5.4 G/DL (ref 6.3–8.2)
RBC # BLD AUTO: 3.99 M/UL (ref 4.05–5.2)
SERVICE CMNT-IMP: ABNORMAL
SERVICE CMNT-IMP: NORMAL
SERVICE CMNT-IMP: NORMAL
SODIUM SERPL-SCNC: 136 MMOL/L (ref 136–145)
WBC # BLD AUTO: 13 K/UL (ref 4.3–11.1)

## 2021-05-02 PROCEDURE — 36415 COLL VENOUS BLD VENIPUNCTURE: CPT

## 2021-05-02 PROCEDURE — 97530 THERAPEUTIC ACTIVITIES: CPT

## 2021-05-02 PROCEDURE — 82962 GLUCOSE BLOOD TEST: CPT

## 2021-05-02 PROCEDURE — 74011250636 HC RX REV CODE- 250/636: Performed by: INTERNAL MEDICINE

## 2021-05-02 PROCEDURE — C9113 INJ PANTOPRAZOLE SODIUM, VIA: HCPCS | Performed by: HOSPITALIST

## 2021-05-02 PROCEDURE — 85025 COMPLETE CBC W/AUTO DIFF WBC: CPT

## 2021-05-02 PROCEDURE — 83735 ASSAY OF MAGNESIUM: CPT

## 2021-05-02 PROCEDURE — 99233 SBSQ HOSP IP/OBS HIGH 50: CPT | Performed by: SURGERY

## 2021-05-02 PROCEDURE — 2709999900 HC NON-CHARGEABLE SUPPLY

## 2021-05-02 PROCEDURE — 65270000029 HC RM PRIVATE

## 2021-05-02 PROCEDURE — 74011250636 HC RX REV CODE- 250/636: Performed by: HOSPITALIST

## 2021-05-02 PROCEDURE — 74018 RADEX ABDOMEN 1 VIEW: CPT

## 2021-05-02 PROCEDURE — 74011000258 HC RX REV CODE- 258: Performed by: INTERNAL MEDICINE

## 2021-05-02 PROCEDURE — 74011000250 HC RX REV CODE- 250: Performed by: INTERNAL MEDICINE

## 2021-05-02 PROCEDURE — 80053 COMPREHEN METABOLIC PANEL: CPT

## 2021-05-02 PROCEDURE — 74011000250 HC RX REV CODE- 250: Performed by: HOSPITALIST

## 2021-05-02 PROCEDURE — 74011250637 HC RX REV CODE- 250/637: Performed by: INTERNAL MEDICINE

## 2021-05-02 RX ORDER — LABETALOL HYDROCHLORIDE 5 MG/ML
10 INJECTION, SOLUTION INTRAVENOUS
Status: DISCONTINUED | OUTPATIENT
Start: 2021-05-02 | End: 2021-05-04

## 2021-05-02 RX ORDER — CLONIDINE 0.1 MG/24H
1 PATCH, EXTENDED RELEASE TRANSDERMAL
Status: DISCONTINUED | OUTPATIENT
Start: 2021-05-02 | End: 2021-05-06

## 2021-05-02 RX ADMIN — SODIUM CHLORIDE, SODIUM LACTATE, POTASSIUM CHLORIDE, AND CALCIUM CHLORIDE 75 ML/HR: 600; 310; 30; 20 INJECTION, SOLUTION INTRAVENOUS at 05:19

## 2021-05-02 RX ADMIN — Medication 10 ML: at 14:03

## 2021-05-02 RX ADMIN — LABETALOL HYDROCHLORIDE 10 MG: 5 INJECTION INTRAVENOUS at 22:09

## 2021-05-02 RX ADMIN — SODIUM CHLORIDE, SODIUM LACTATE, POTASSIUM CHLORIDE, AND CALCIUM CHLORIDE 75 ML/HR: 600; 310; 30; 20 INJECTION, SOLUTION INTRAVENOUS at 20:58

## 2021-05-02 RX ADMIN — CEFTRIAXONE 1 G: 1 INJECTION, POWDER, FOR SOLUTION INTRAMUSCULAR; INTRAVENOUS at 16:32

## 2021-05-02 RX ADMIN — HYDRALAZINE HYDROCHLORIDE 5 MG: 20 INJECTION INTRAMUSCULAR; INTRAVENOUS at 09:42

## 2021-05-02 RX ADMIN — Medication 10 ML: at 22:00

## 2021-05-02 RX ADMIN — Medication 10 ML: at 05:04

## 2021-05-02 RX ADMIN — SODIUM CHLORIDE 40 MG: 9 INJECTION, SOLUTION INTRAMUSCULAR; INTRAVENOUS; SUBCUTANEOUS at 05:00

## 2021-05-02 RX ADMIN — KETOROLAC TROMETHAMINE 7.5 MG: 15 INJECTION, SOLUTION INTRAMUSCULAR; INTRAVENOUS at 03:05

## 2021-05-02 RX ADMIN — PHENOL 1 SPRAY: 1.5 LIQUID ORAL at 03:07

## 2021-05-02 RX ADMIN — HYDRALAZINE HYDROCHLORIDE 5 MG: 20 INJECTION INTRAMUSCULAR; INTRAVENOUS at 03:00

## 2021-05-02 NOTE — PROGRESS NOTES
Problem: Mobility Impaired (Adult and Pediatric)  Goal: *Acute Goals and Plan of Care (Insert Text)  Description: STG:  (1.)Ms. Cristóbal Hinton will move from supine to sit and sit to supine  with STAND BY ASSIST within 4-7 treatment day(s). Progressing 5/2  (2.)Ms. Cristóbal Hinton will transfer from bed to chair and chair to bed with STAND BY ASSIST using the least restrictive device within 4-7 treatment day(s). (3.)Ms. Cristóbal Hinton will ambulate with STAND BY ASSIST for 400 feet with the least restrictive device within 4-7 treatment day(s). ________________________________________________________________________________________________      Outcome: Progressing Towards Goal     PHYSICAL THERAPY: Daily Note and PM 5/2/2021  INPATIENT: PT Visit Days : 3  [place] / [place] / Product Type: Medicare /       NAME/AGE/GENDER: Flores Forrest is a 68 y.o. female   PRIMARY DIAGNOSIS: Acute gastroenteritis [K52.9] SBO (small bowel obstruction) ([facility]) [facility]       ICD-10: Treatment Diagnosis:    · Generalized Muscle Weakness (M62.81)  · Difficulty in walking, Not elsewhere classified (R26.2)   Precaution/Allergies:  Lortab [hydrocodone-acetaminophen] and Acetaminophen      ASSESSMENT:     Ms. Cristóbal Hinton presents with generalized weakness and decreased independence with functional mobility. She was admitted with above diagnosis and is supine on contact with spouse at bedside. She is normally independent with mobility without assistive devices and had been walking around 3 miles every day. On assessment she walked into bathroom, she is unsteady on her feet and would be unsafe to be up without assist. Worked on walking in the hallway with hand held assist and pt walked 200 feet with hand held assist. On return to room she returned supine with needs in reach and family remains at bedside.  She may benefit from a walker to use for stability and would benefit from HHPT for follow up when she is discharge from the hospital.  5/1 - supine on contact. Pt fatigued but willing to get up. Worked on standing balance including marching, sidestepping at EOB, and seated LE exercise to improve mobility and coordination. Limited mobility due to NG tube. Pt fatigued but able to stand for majority of treatment time. Left pt up in recliner with RN present. This section established at most recent assessment   PROBLEM LIST (Impairments causing functional limitations):  1. Decreased Strength  2. Decreased ADL/Functional Activities  3. Decreased Transfer Abilities  4. Decreased Ambulation Ability/Technique  5. Decreased Balance  6. Decreased Activity Tolerance   INTERVENTIONS PLANNED: (Benefits and precautions of physical therapy have been discussed with the patient.)  1. Balance Exercise  2. Bed Mobility  3. Gait Training  4. Therapeutic Activites  5. Therapeutic Exercise/Strengthening  6. Transfer Training     TREATMENT PLAN: Frequency/Duration: daily for duration of hospital stay  Rehabilitation Potential For Stated Goals: Good     REHAB RECOMMENDATIONS (at time of discharge pending progress):    Placement: It is my opinion, based on this patient's performance to date, that Ms. Laura Dixon may benefit from [address] after discharge due to the functional deficits listed above that are likely to improve with skilled rehabilitation because he/she has multiple medical issues that affect his/her functional mobility in the community.   Equipment:    To be determined, may need a rolling walker              HISTORY:   History of Present Injury/Illness (Reason for Referral):  PER MD NOTE: Charlette Agosto is a 68 y.o. female, with a history of  has a past medical history of DDD (degenerative disc disease), lumbar (2/19/2020), DM2 (diabetes mellitus, type 2) [facility] (3/4/2014), Gastroesophageal reflux disease without esophagitis (5/22/2015), HLD (hyperlipidemia), HTN (hypertension), Menopause, Sciatica of right side (2/19/2020), and UTI (urinary tract infection) (4/27/2021). She also has no past medical history of Asthma, Autoimmune disease ([address] CAD (coronary artery disease), Cancer ([address] Chronic kidney disease, COPD, Dementia, Heart failure ([address] Liver disease, Other ill-defined conditions(799.89), Psychiatric disorder, PUD (peptic ulcer disease), Seizures ([address] Sleep disorder, Stroke ([address] or Thromboembolus ([address] ,  has a past surgical history that includes hx hysterectomy; hx partial thyroidectomy; hx other surgical; hx colonoscopy (01/25/2010); and neurological procedure unlisted (2014). who presents to the ER with report of two days of persistent lower abdominal pain, nausea, and vomiting. She was seen in the ER yesterday and was discharged home with diagnosis of UTI after labs and CT abd/pelvis were unremarkable for admitting diagnosis. Pt reports persistent nausea and vomiting since that time and was unable to keep down Keflex that was prescribed for the UTI. Denies any fevers, chills, diarrhea. Past Medical History/Comorbidities:   Ms. Fallon Delgado  has a past medical history of DDD (degenerative disc disease), lumbar (2/19/2020), DM2 (diabetes mellitus, type 2) ([address] (3/4/2014), Gastroesophageal reflux disease without esophagitis (5/22/2015), HLD (hyperlipidemia), HTN (hypertension), Menopause, Sciatica of right side (2/19/2020), and UTI (urinary tract infection) (4/27/2021). She also has no past medical history of Asthma, Autoimmune disease ([address] CAD (coronary artery disease), Cancer ([address] Chronic kidney disease, COPD, Dementia, Heart failure ([address] Liver disease, Other ill-defined conditions(799.89), Psychiatric disorder, PUD (peptic ulcer disease), Seizures ([address] Sleep disorder, Stroke ([address] or Thromboembolus ([address] Ms. Fallon Delgado  has a past surgical history that includes hx hysterectomy; hx partial thyroidectomy; hx other surgical; hx colonoscopy (01/25/2010); and neurological procedure unlisted (2014).   Social History/Living Environment:   Home Environment: Private residence  One/Two Story Residence: Two story  Living Alone: No  Support Systems: Spouse/Significant Other/Partner  Patient Expects to be Discharged to[de-identified] Private residence  Current DME Used/Available at Home: None  Prior Level of Function/Work/Activity:  Pt lives at home with spouse, independent with mobility without assistive devices     Number of Personal Factors/Comorbidities that affect the Plan of Care: 0: LOW COMPLEXITY   EXAMINATION:   Most Recent Physical Functioning:   Gross Assessment:  AROM: Within functional limits  Strength: Generally decreased, functional               Posture:     Balance:  Sitting: Intact  Standing: With support; Intact Bed Mobility:  Supine to Sit: Stand-by assistance  Scooting: Supervision  Wheelchair Mobility:     Transfers:  Sit to Stand: Contact guard assistance  Stand to Sit: Contact guard assistance  Bed to Chair: Contact guard assistance  Gait:     Speed/Sunshine: Slow  Step Length: Right shortened;Left shortened  Gait Abnormalities: Decreased step clearance  Distance (ft): 10 Feet (ft)(beside due to NG tube)  Assistive Device: (HHA)  Ambulation - Level of Assistance: Contact guard assistance  Interventions: Safety awareness training;Verbal cues; Visual/Demos      Body Structures Involved:  1. Muscles Body Functions Affected:  1. Movement Related Activities and Participation Affected:  1. Mobility  2. Self Care   Number of elements that affect the Plan of Care: 4+: HIGH COMPLEXITY   CLINICAL PRESENTATION:   Presentation: Stable and uncomplicated: LOW COMPLEXITY   CLINICAL DECISION MAKING:   [employer] 6 Clicks   Basic Mobility Inpatient Short Form  How much difficulty does the patient currently have. .. Unable A Lot A Little None   1. Turning over in bed (including adjusting bedclothes, sheets and blankets)? [] 1   [] 2   [x] 3   [] 4   2.   Sitting down on and standing up from a chair with arms ( e.g., wheelchair, bedside commode, etc.)   [] 1   [] 2   [x] 3   [] 4   3. Moving from lying on back to sitting on the side of the bed? [] 1   [] 2   [x] 3   [] 4   How much help from another person does the patient currently need. .. Total A Lot A Little None   4. Moving to and from a bed to a chair (including a wheelchair)? [] 1   [] 2   [x] 3   [] 4   5. Need to walk in hospital room? [] 1   [] 2   [x] 3   [] 4   6. Climbing 3-5 steps with a railing? [] 1   [] 2   [x] 3   [] 4   © 2007, Trustees of [facility] [employer], under license to [employer]. All rights reserved      Score:  Initial: 18 Most Recent: X (Date: -- )    Interpretation of Tool:  Represents activities that are increasingly more difficult (i.e. Bed mobility, Transfers, Gait). Medical Necessity:     · Patient is expected to demonstrate progress in   · strength and functional technique  ·  to   · decrease assistance required with functional mobility  · . Reason for Services/Other Comments:  · Patient continues to require skilled intervention due to   · Inability to complete functional mobility independently  · . Use of outcome tool(s) and clinical judgement create a POC that gives a: Clear prediction of patient's progress: LOW COMPLEXITY            TREATMENT:   (In addition to Assessment/Re-Assessment sessions the following treatments were rendered)   Pre-treatment Symptoms/Complaints:  Tired but agreeable  Pain: Initial:   Pain Intensity 1: 0  Post Session:  0/10   Therapeutic Activity (15): Therapeutic activities including bed mobility, chair mobility, sidestepping, dynamic balance in standing, and seated LQ exercise to improve mobility, strength and balance. Required minimal visual and verbal cues to promote dynamic balance in standing.      Date:  4/30 Date:  5/2 Date:     Activity/Exercise Parameters Parameters Parameters   Ankle puumps 12     Quad ssets 12     heelslides 12     Hip abd/add 12     SAQ 12     LAQ  20 B    Seated march/standing march  x20 ea        Braces/Orthotics/Lines/Etc:   · IV  · nasogastric tube  · O2 Device: None (Room air)  Treatment/Session Assessment:    · Response to Treatment: Good standing stability, limited by NG tube  · Interdisciplinary Collaboration:   o Physical Therapist  o Registered Nurse  · After treatment position/precautions:   o Up in chair  o Bed/Chair-wheels locked  o Bed in low position  o Call light within reach  o Nurse at bedside   · Compliance with Program/Exercises: Compliant all of the time, Will assess as treatment progresses  · Recommendations/Intent for next treatment session: \"Next visit will focus on advancements to more challenging activities and reduction in assistance provided\".   Total Treatment Duration:  PT Patient Time In/Time Out  Time In: 1225  Time Out: [address], PT

## 2021-05-02 NOTE — PROGRESS NOTES
H&P/Consult Note/Progress Note/Office Note:   Kaya Brito  MRN: [id number]  [id number]  Age:80 y.o.    HPI: Kaya Brito is a 68 y.o. female who was admitted on 20 by the Hospitalists   after she presented to the ER a 2nd time in 2 days for with constant and progressive N/V/lower abd pain for 2 days. Nothing made symptoms better or worse. No associated fever  She is s/p hysterectomy approx     CT on 21 showed thickening of focal small bowel wall with proximal dilation and likely partial SBO  Dr Anais Wang was consulted for SBO on 21. Her symptoms worsened today and she had the below abd Xray with AF levels and signs of persistent SBO       21 CT abd/pelvis with oral and IV contrast  Hx:  Follow-up of abnormal small bowel with subacute progressive worsening generalized abdominal distention and pain. Spontaneous bacterial peritonitis.      FINDINGS: Partially included lung bases demonstrate a new small posterior  layering pleural effusion on the right and mild infiltrates/atelectasis in both  posterior lower lungs. No dense consolidation. Nonspecific mild wall thickening  of distal esophagus and probable tiny hiatal hernia.     Abdomen: Unchanged indeterminate right adrenal nodule, most likely incidental  adenoma in the absence of clinical suspicion. The kidneys, adrenals, spleen,  liver, pancreas, gallbladder have not changed in 2 days. Right renal cyst again  noted. Diffuse atherosclerotic changes again noted of aorta. Contrast does  opacify major vessels. No evidence of free air, large volume ascites, or focal  fluid collection in the abdomen. There is increased tracer small volume of  abdominopelvic ascites.     GI tract demonstrates contrast from level of stomach to rectum. Stomach,  duodenum, large bowel, distal ileum are not distended. There is a focal right  lower quadrant small bowel loop with wall thickening and surrounding  inflammatory stranding.  Proximal to this there are multiple mildly dilated small  bowel loops which have slightly increased since prior. Nonspecific wall thickening is also noted scattered and several other small bowel loops. nura: No developing abscess, lymphadenopathy or mass. Small volume ascites.     Bones: No acute osseous lesions. IMPRESSION  1. Abnormal small bowel, most prominently involving focal wall thickening and inflammation in a right lower quadrant loop. 2. Dilatation of bowel loops proximal to this, likely partial small bowel obstruction. Contrast does progress to the level of the rectum. 3. Increased ascites, small volume. 4. Right small pleural effusion. Mild bibasilar lung infiltrates versus atelectasis        5/1/21 abd Xray, 2views  Hx:  Abdominal pain and bloating    Increased small bowel distention. Multiple air-fluid levels are now present. Contrast is present in the colon from prior CT scan. There is no free air. There are no renal calculi. The lung bases are clear.     IMPRESSION  Increased small bowel distention, likely high-grade obstruction           Past Medical History:   Diagnosis Date    DDD (degenerative disc disease), lumbar 2/19/2020    DM2 (diabetes mellitus, type 2) ([facility] [address] 3/4/2014    Gastroesophageal reflux disease without esophagitis 5/22/2015    HLD (hyperlipidemia)     HTN (hypertension)     Menopause     Sciatica of right side 2/19/2020    UTI (urinary tract infection) 4/27/2021     Past Surgical History:   Procedure Laterality Date    HX COLONOSCOPY  01/25/2010    HX HYSTERECTOMY      10 years ago    HX OTHER SURGICAL      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  HX PARTIAL THYROIDECTOMY      Lobectomy for benign mass.     NEUROLOGICAL PROCEDURE UNLISTED  [place] [place]-Administered Medications   Medication Dose Route Frequency    labetaloL (NORMODYNE;TRANDATE) injection 10 mg  10 mg IntraVENous Q4H PRN    [START ON 5/3/2021] pantoprazole (PROTONIX) 40 mg in 0.9% sodium chloride 10 mL injection  40 mg IntraVENous DAILY    cloNIDine (CATAPRES) 0.1 mg/24 hr patch 1 Patch  1 Patch TransDERmal Q7D    amLODIPine (NORVASC) tablet 5 mg  5 mg Oral DAILY    insulin lispro (HUMALOG) injection   SubCUTAneous TIDAC    lactated Ringers infusion  75 mL/hr IntraVENous CONTINUOUS    cefTRIAXone (ROCEPHIN) 1 g in 0.9% sodium chloride (MBP/ADV) 50 mL MBP  1 g IntraVENous Q24H    ketorolac (TORADOL) injection 7.5 mg  7.5 mg IntraVENous Q6H PRN    phenol throat spray (CHLORASEPTIC) 1 Spray  1 Spray Oral PRN    [Held by provider] enoxaparin (LOVENOX) injection 30 mg  30 mg SubCUTAneous DAILY    diphenhydrAMINE (BENADRYL) injection 12.5 mg  12.5 mg IntraVENous Q6H PRN    [Held by provider] metFORMIN (GLUCOPHAGE) tablet 1,000 mg  1,000 mg Oral BID WITH MEALS    rosuvastatin (CRESTOR) tablet 20 mg  20 mg Oral QHS    sodium chloride (NS) flush 5-40 mL  5-40 mL IntraVENous Q8H    sodium chloride (NS) flush 5-40 mL  5-40 mL IntraVENous PRN    acetaminophen (TYLENOL) tablet 650 mg  650 mg Oral Q6H PRN    Or    acetaminophen (TYLENOL) suppository 650 mg  650 mg Rectal Q6H PRN    polyethylene glycol (MIRALAX) packet 17 g  17 g Oral DAILY PRN    promethazine (PHENERGAN) tablet 12.5 mg  12.5 mg Oral Q6H PRN    Or    ondansetron (ZOFRAN) injection 4 mg  4 mg IntraVENous Q6H PRN     Lortab [hydrocodone-acetaminophen] and Acetaminophen  Social History     Socioeconomic History    Marital status:      Spouse name: Not on file    Number of children: Not on file    Years of education: Not on file    Highest education level: Not on file   Tobacco Use    Smoking status: Never Smoker    Smokeless tobacco: Never Used   Substance and Sexual Activity    Alcohol use: No    Drug use: No    Sexual activity: Yes     Partners: Male     Birth control/protection: Surgical   Other Topics Concern     Social History     Tobacco Use   Smoking Status Never Smoker   Smokeless Tobacco Never Used Family History   Problem Relation Age of Onset    Heart Disease Father 76    Kidney Disease Father         hepatitis, post-surgery (tranfusion)    Cancer Mother [de-identified]        Breast    Breast Cancer Mother 80    Alzheimer Sister [de-identified]    Arthritis-osteo Sister     Arthritis-osteo Brother         Back surgery    Diabetes Neg Hx      ROS: The patient has no difficulty with chest pain or shortness of breath. No fever or chills. Comprehensive review of systems was otherwise unremarkable except as noted above. Physical Exam:   Visit Vitals  BP (!) 155/84 (BP 1 Location: Right upper arm, BP Patient Position: At rest;Reclining)   Pulse 100   Temp 98.1 °F (36.7 °C)   Resp 20   Ht 5' 4\" (1.626 m)   Wt 134 lb (60.8 kg)   SpO2 95%   BMI 23.00 kg/m²     Vitals:    05/02/21 0808 05/02/21 1041 05/02/21 1139 05/02/21 1436   BP: (!) 179/73 (!) 159/66 (!) 174/73 (!) 155/84   Pulse: 98 (!) 114 (!) 107 100   Resp: 16 18 18 20   Temp: 98.1 °F (36.7 °C)  98.4 °F (36.9 °C) 98.1 °F (36.7 °C)   SpO2: 92%  94% 95%   Weight:       Height:         05/02 0701 - 05/02 1900  In: -   Out: 200   04/30 1901 - 05/02 0700  In: 2120 [I.V.:2120]  Out: -     Constitutional: Alert, oriented, cooperative patient in no acute distress; appears stated age    Eyes:Sclera are clear. EOMs intact  ENMT: no external lesions gross hearing normal; no obvious neck masses, no ear or lip lesions, nares normal  CV: RRR. Normal perfusion  Resp: No JVD. Breathing is  non-labored; no audible wheezing. GI:  Distended and tympanetic; no peritoneal signs     Musculoskeletal: unremarkable with normal function. No embolic signs or cyanosis.    Neuro:  Oriented; moves all 4; no focal deficits  Psychiatric: normal affect and mood, no memory impairment    Recent vitals (if inpt):  Patient Vitals for the past 24 hrs:   BP Temp Pulse Resp SpO2   05/02/21 1436 (!) 155/84 98.1 °F (36.7 °C) 100 20 95 %   05/02/21 1139 (!) 174/73 98.4 °F (36.9 °C) (!) 107 18 94 % 05/02/21 1041 (!) 159/66  (!) 114 18    05/02/21 0808 (!) 179/73 98.1 °F (36.7 °C) 98 16 92 %   05/02/21 0245 (!) 179/83 98.4 °F (36.9 °C) 97 18 98 %   05/01/21 2338 (!) 173/74 98.4 °F (36.9 °C) 92 18 98 %   05/01/21 1940 (!) 172/75 98.2 °F (36.8 °C) 91 18 98 %       Amount and/or Complexity of Data Reviewed and Analyzed:  I reviewed and analyzed all of the unique labs and radiologic studies that are shown below as well as any that are in the HPI, and any that are in the expanded problem list below  *Each unique test, order, or document contributes to the combination of 2 or combination of 3 in Category 1 below. For this visit I also reviewed old records and prior notes. Recent Labs     05/02/21  0828   WBC 13.0*   HGB 13.2         K 4.0      CO2 22   BUN 12   CREA 0.45*   *   TBILI 0.5   ALT 19   AP 46*     Review of most recent CBC  Lab Results   Component Value Date/Time    WBC 13.0 (H) 05/02/2021 08:28 AM    HGB 13.2 05/02/2021 08:28 AM    HCT 38.0 05/02/2021 08:28 AM    PLATELET 381 68/45/1308 08:28 AM    MCV 95.2 05/02/2021 08:28 AM       Review of most recent BMP  Lab Results   Component Value Date/Time    Sodium 136 05/02/2021 08:28 AM    Potassium 4.0 05/02/2021 08:28 AM    Chloride 104 05/02/2021 08:28 AM    CO2 22 05/02/2021 08:28 AM    Anion gap 10 05/02/2021 08:28 AM    Glucose 135 (H) 05/02/2021 08:28 AM    BUN 12 05/02/2021 08:28 AM    Creatinine 0.45 (L) 05/02/2021 08:28 AM    BUN/Creatinine ratio 29 (H) 04/21/2021 10:34 AM    GFR est AA >60 05/02/2021 08:28 AM    GFR est non-AA >60 05/02/2021 08:28 AM    Calcium 8.1 (L) 05/02/2021 08:28 AM       Review of most recent LFTs (and lipase if done)  Lab Results   Component Value Date/Time    ALT (SGPT) 19 05/02/2021 08:28 AM    AST (SGOT) 18 05/02/2021 08:28 AM    Alk.  phosphatase 46 (L) 05/02/2021 08:28 AM    Bilirubin, direct 0.11 04/21/2021 10:34 AM    Bilirubin, total 0.5 05/02/2021 08:28 AM     Lab Results Component Value Date/Time    Lipase 48 (L) 04/27/2021 05:54 PM       Lab Results   Component Value Date/Time    Bilirubin, direct 0.11 04/21/2021 10:34 AM    Troponin-I <0.05 12/30/2010 05:07 AM       Review of most recent HgbA1c  Lab Results   Component Value Date/Time    Hemoglobin A1c 6.4 (H) 04/21/2021 10:34 AM       Nutritional assessment screen for wound healing issues:  Lab Results   Component Value Date/Time    Protein, total 5.4 (L) 05/02/2021 08:28 AM    Albumin 2.9 (L) 05/02/2021 08:28 AM       @lastcovr@  XR Results (most recent):  Results from [place] encounter on 04/27/21   XR ABD (KUB)    Narrative Abdominal film for feeding tube placement, 5/2/2021    History: Feeding tube placement. Comparison: KUB 5/1/2021. Findings: 2 5 user committed for evaluation. Feeding tube position is not  significant changed with the tip and side port below the gastroesophageal  junction grossly good position. Gas dilation of small bowel is once again seen. This is not significant change from the more recent study although does appear  improved from a preceding study. Assessment for free air is limited although no  obvious free air is seen. Oral contrast is seen in the right colon and rectum. Impression 1. Good position of feeding tube. 2. Grossly stable small bowel dilation with oral contrast identified within the  right colon and rectum. CT Results (most recent):  Results from Hospital Encounter encounter on 04/27/21   CT ABD PELV W CONT    Narrative CT of the Abdomen and Pelvis with contrast    CLINICAL INDICATION:  Follow-up of abnormal small bowel with subacute  progressive worsening generalized abdominal distention and pain. Spontaneous  bacterial peritonitis. COMPARISON: 4/26/2021    TECHNIQUE: Automated exposure Control was used.  Multiple axial images were  obtained through the abdomen and pelvis after intravenous injection of 125cc of  isovue 370 IV contrast to further evaluate vessels and organs. Coronal  reformatted images were done for further evaluation of bones and organs. Oral  contrast was given for further evaluation of GI tract and adjacent organs. FINDINGS: Partially included lung bases demonstrate a new small posterior  layering pleural effusion on the right and mild infiltrates/atelectasis in both  posterior lower lungs. No dense consolidation. Nonspecific mild wall thickening  of distal esophagus and probable tiny hiatal hernia. Abdomen: Unchanged indeterminate right adrenal nodule, most likely incidental  adenoma in the absence of clinical suspicion. The kidneys, adrenals, spleen,  liver, pancreas, gallbladder have not changed in 2 days. Right renal cyst again  noted. Diffuse atherosclerotic changes again noted of aorta. Contrast does  opacify major vessels. No evidence of free air, large volume ascites, or focal  fluid collection in the abdomen. There is increased tracer small volume of  abdominopelvic ascites. GI tract demonstrates contrast from level of stomach to rectum. Stomach,  duodenum, large bowel, distal ileum are not distended. There is a focal right  lower quadrant small bowel loop with wall thickening and surrounding  inflammatory stranding. Proximal to this there are multiple mildly dilated small  bowel loops which have slightly increased since prior. Nonspecific wall  thickening is also noted scattered and several other small bowel loops. Pelvis: No developing abscess, lymphadenopathy or mass. Small volume ascites. Bones: No acute osseous lesions. Impression 1. Abnormal small bowel, most prominently involving focal wall thickening and  inflammation in a right lower quadrant loop. 2. Dilatation of bowel loops proximal to this, likely partial small bowel  obstruction. Contrast does progress to the level of the rectum. 3. Increased ascites, small volume. 4. Right small pleural effusion.  Mild bibasilar lung infiltrates versus  atelectasis. US Results (most recent):  No results found for this or any previous visit. Admission date (for inpatients): 4/27/2021   * No surgery found *  * No surgery found *        ASSESSMENT/PLAN:  Problem List  Date Reviewed: 4/21/2021          Codes Class Noted    Hypomagnesemia ICD-10-CM: E83.42  ICD-9-CM: 275.2  5/1/2021        * (Principal) SBO (small bowel obstruction) ([facility] ICD-10-CM: H27.707  ICD-9-CM: 560.9  4/27/2021        Leukocytosis ICD-10-CM: H31.725  ICD-9-CM: 288.60  4/27/2021        UTI (urinary tract infection) ICD-10-CM: N39.0  ICD-9-CM: 599.0  4/27/2021        Lumbar radiculopathy ICD-10-CM: M54.16  ICD-9-CM: 724.4  2/19/2020        DDD (degenerative disc disease), lumbar ICD-10-CM: M51.36  ICD-9-CM: 722.52  2/19/2020        Gastroesophageal reflux disease without esophagitis ICD-10-CM: K21.9  ICD-9-CM: 530.81  6/7/2017        Type 2 diabetes mellitus without complication, without long-term current use of insulin ([facility] ICD-10-CM: E11.9  ICD-9-CM: 250.00  11/29/2016        Mixed hyperlipidemia ICD-10-CM: E78.2  ICD-9-CM: 272.2  11/29/2016        Osteopenia of multiple sites ICD-10-CM: M85.89  ICD-9-CM: 733.90  11/29/2016    Overview Signed 4/21/2021  8:19 AM by Carolyn Jimenez MD     Not compliant with multiple BMD orders.               Essential hypertension ICD-10-CM: I10  ICD-9-CM: 401.9  2/23/2016            Principal Problem:    SBO (small bowel obstruction) ([facility] [address] (4/27/2021)    Active Problems:    Essential hypertension (2/23/2016)      Type 2 diabetes mellitus without complication, without long-term current use of insulin ([facility] (11/29/2016)      Mixed hyperlipidemia (11/29/2016)      Gastroesophageal reflux disease without esophagitis (6/7/2017)      Leukocytosis (4/27/2021)      UTI (urinary tract infection) (4/27/2021)      Hypomagnesemia (5/1/2021)           Number and Complexity of Problems addressed and   Risks of complications and/or morbidity of management      Partial SBO  Continue inpt admission  NPO  NGT  IVF  Serial exam    Case discussed with Hospitalist on call  I will inform Dr Nahid Jones of her status tomorrow morning   Watch renal function daily with IV meds and hydration  Lengthy discussion with pt's family     Likely repeat CT without contrast in 2-4 days      Hypomagnesemia  Replace Mg++      Level of MDM (2/3 elements below)  Number and Complexity of Problems Addressed Amount and/or Complexity of Data to be Reviewed and Analyzed  *Each unique test, order, or document contributes to the combination of 2 or combination of 3 in Category 1 below.  Risk of Complications and/or Morbidity or Mortality of pt Management     47712  18229 SF Minimal  1self-limited or minor problem Minimal or none Minimal risk of morbidity from additional diagnostic testing or Rx   97985  14979 Low Low  2or more self-limited or minor problems;    or  1stable chronic illness;    or  5GPEEM, uncomplicated illness or injury   Limited  (Must meet the requirements of at least 1 of the 2 categories)  Category 1: Tests and documents   Any combination of 2 from the following:  Review of prior external note(s) from each unique source*;  review of the result(s) of each unique test*;   ordering of each unique test*    or   Category 2: Assessment requiring an independent historian(s)  (For the categories of independent interpretation of tests and discussion of management or test interpretation, see moderate or high) Low risk of morbidity from additional diagnostic testing or treatment     20752  01827 Mod Moderate  1or more chronic illnesses with exacerbation, progression, or side effects of treatment;    or  2or more stable chronic illnesses;    or  1undiagnosed new problem with uncertain prognosis;    or  1acute illness with systemic symptoms;    or  [id number] complicated injury   Moderate  (Must meet the requirements of at least 1 out of 3 categories)  Category 1: Tests, documents, or independent historian(s)  Any combination of 3 from the following:   Review of prior external note(s) from each unique source*;  Review of the result(s) of each unique test*;  Ordering of each unique test*;  Assessment requiring an independent historian(s)    or  Category 2: Independent interpretation of tests   Independent interpretation of a test performed by another physician/other qualified health care professional (not separately reported);     or  Category 3: Discussion of management or test interpretation  Discussion of management or test interpretation with external physician/other qualified health care professional/appropriate source (not separately reported)   Moderate risk of morbidity from additional diagnostic testing or treatment  Examples only:  Prescription drug management   Decision regarding minor surgery with identified patient or procedure risk factors  Decision regarding elective major surgery without identified patient or procedure risk factors   Diagnosis or treatment significantly limited by social determinants of health       56717 14053 High High  1or more chronic illnesses with severe exacerbation, progression, or side effects of treatment;    or  1 acute or chronic illness or injury that poses a threat to life or bodily function   Extensive  (Must meet the requirements of at least 2 out of 3 categories)  Category 1: Tests, documents, or independent historian(s)  Any combination of 3 from the following:   Review of prior external note(s) from each unique source*;  Review of the result(s) of each unique test*;   Ordering of each unique test*;   Assessment requiring an independent historian(s)    or   Category 2: Independent interpretation of tests   Independent interpretation of a test performed by another physician/other qualified health care professional (not separately reported);     or  Category 3: Discussion of management or test interpretation  Discussion of management or test interpretation with external physician/other qualified health care professional/appropriate source (not separately reported)   High risk of morbidity from additional diagnostic testing or treatment  Examples only:  Drug therapy requiring intensive monitoring for toxicity  Decision regarding elective major surgery with identified patient or procedure risk factors  Decision regarding emergency major surgery  Decision regarding hospitalization  Decision not to resuscitate or to de-escalate care because of poor prognosis             I have personally performed a face-to-face diagnostic evaluation and management  service on this patient. I have independently seen the patient. I have independently obtained the above history from the patient/family. I have independently examined the patient with above findings. I have independently reviewed data/labs for this patient and developed the above plan of care (MDM). Signed: Angie Fowler.  Dawn Smart MD, FACS

## 2021-05-02 NOTE — PROGRESS NOTES
Resting in bed, NGT placed as ordered, by myself and Amee Auguste, at 6385. Pt tolerated without difficulty. KUB ordered per protocol.

## 2021-05-02 NOTE — PROGRESS NOTES
Bedside report given to [employer]. Spouse at bedside. Pt resting in bed. Not feeling well today, when asked to clarify, pt reports the NG tube isn't very comfortable. Pt using throat spray. Pt asked to brush teeth. Later today.

## 2021-05-02 NOTE — PROGRESS NOTES
OT Note:    At time of OT attempt, pt politely refused stating that she had just gotten to the chair with PT and had done exercises this AM. Pt wanting to rest. Will check back at later date.     Thank you,  Enoch Reynoso, OTR/L

## 2021-05-03 ENCOUNTER — APPOINTMENT (OUTPATIENT)
Dept: GENERAL RADIOLOGY | Age: 81
DRG: 389 | End: 2021-05-03
Attending: INTERNAL MEDICINE
Payer: MEDICARE

## 2021-05-03 LAB
ANION GAP SERPL CALC-SCNC: 11 MMOL/L (ref 7–16)
BASOPHILS # BLD: 0 K/UL (ref 0–0.2)
BASOPHILS NFR BLD: 0 % (ref 0–2)
BUN SERPL-MCNC: 15 MG/DL (ref 8–23)
CALCIUM SERPL-MCNC: 8 MG/DL (ref 8.3–10.4)
CHLORIDE SERPL-SCNC: 104 MMOL/L (ref 98–107)
CO2 SERPL-SCNC: 22 MMOL/L (ref 21–32)
CREAT SERPL-MCNC: 0.36 MG/DL (ref 0.6–1)
DIFFERENTIAL METHOD BLD: ABNORMAL
EOSINOPHIL # BLD: 0.2 K/UL (ref 0–0.8)
EOSINOPHIL NFR BLD: 2 % (ref 0.5–7.8)
ERYTHROCYTE [DISTWIDTH] IN BLOOD BY AUTOMATED COUNT: 12.3 % (ref 11.9–14.6)
GLUCOSE BLD STRIP.AUTO-MCNC: 110 MG/DL (ref 65–100)
GLUCOSE BLD STRIP.AUTO-MCNC: 119 MG/DL (ref 65–100)
GLUCOSE BLD STRIP.AUTO-MCNC: 98 MG/DL (ref 65–100)
GLUCOSE BLD STRIP.AUTO-MCNC: 99 MG/DL (ref 65–100)
GLUCOSE SERPL-MCNC: 113 MG/DL (ref 65–100)
HCT VFR BLD AUTO: 33.1 % (ref 35.8–46.3)
HGB BLD-MCNC: 11.9 G/DL (ref 11.7–15.4)
IMM GRANULOCYTES # BLD AUTO: 0.1 K/UL (ref 0–0.5)
IMM GRANULOCYTES NFR BLD AUTO: 1 % (ref 0–5)
LYMPHOCYTES # BLD: 1.4 K/UL (ref 0.5–4.6)
LYMPHOCYTES NFR BLD: 12 % (ref 13–44)
MAGNESIUM SERPL-MCNC: 2 MG/DL (ref 1.8–2.4)
MCH RBC QN AUTO: 33.6 PG (ref 26.1–32.9)
MCHC RBC AUTO-ENTMCNC: 36 G/DL (ref 31.4–35)
MCV RBC AUTO: 93.5 FL (ref 79.6–97.8)
MONOCYTES # BLD: 1.2 K/UL (ref 0.1–1.3)
MONOCYTES NFR BLD: 10 % (ref 4–12)
NEUTS SEG # BLD: 8.3 K/UL (ref 1.7–8.2)
NEUTS SEG NFR BLD: 75 % (ref 43–78)
NRBC # BLD: 0 K/UL (ref 0–0.2)
PHOSPHATE SERPL-MCNC: 3.1 MG/DL (ref 2.3–3.7)
PLATELET # BLD AUTO: 256 K/UL (ref 150–450)
PMV BLD AUTO: 9.7 FL (ref 9.4–12.3)
POTASSIUM SERPL-SCNC: 3.7 MMOL/L (ref 3.5–5.1)
RBC # BLD AUTO: 3.54 M/UL (ref 4.05–5.2)
SERVICE CMNT-IMP: ABNORMAL
SERVICE CMNT-IMP: ABNORMAL
SERVICE CMNT-IMP: NORMAL
SERVICE CMNT-IMP: NORMAL
SODIUM SERPL-SCNC: 137 MMOL/L (ref 136–145)
WBC # BLD AUTO: 11.1 K/UL (ref 4.3–11.1)

## 2021-05-03 PROCEDURE — 65270000029 HC RM PRIVATE

## 2021-05-03 PROCEDURE — 97530 THERAPEUTIC ACTIVITIES: CPT

## 2021-05-03 PROCEDURE — 36415 COLL VENOUS BLD VENIPUNCTURE: CPT

## 2021-05-03 PROCEDURE — 97535 SELF CARE MNGMENT TRAINING: CPT

## 2021-05-03 PROCEDURE — 84100 ASSAY OF PHOSPHORUS: CPT

## 2021-05-03 PROCEDURE — 99233 SBSQ HOSP IP/OBS HIGH 50: CPT | Performed by: SURGERY

## 2021-05-03 PROCEDURE — 80048 BASIC METABOLIC PNL TOTAL CA: CPT

## 2021-05-03 PROCEDURE — 74011250637 HC RX REV CODE- 250/637: Performed by: FAMILY MEDICINE

## 2021-05-03 PROCEDURE — 74018 RADEX ABDOMEN 1 VIEW: CPT

## 2021-05-03 PROCEDURE — 85025 COMPLETE CBC W/AUTO DIFF WBC: CPT

## 2021-05-03 PROCEDURE — 74011250636 HC RX REV CODE- 250/636: Performed by: INTERNAL MEDICINE

## 2021-05-03 PROCEDURE — 74011000258 HC RX REV CODE- 258: Performed by: INTERNAL MEDICINE

## 2021-05-03 PROCEDURE — 74011250636 HC RX REV CODE- 250/636: Performed by: HOSPITALIST

## 2021-05-03 PROCEDURE — 2709999900 HC NON-CHARGEABLE SUPPLY

## 2021-05-03 PROCEDURE — 82962 GLUCOSE BLOOD TEST: CPT

## 2021-05-03 PROCEDURE — 74011250637 HC RX REV CODE- 250/637: Performed by: INTERNAL MEDICINE

## 2021-05-03 PROCEDURE — 74011000250 HC RX REV CODE- 250: Performed by: INTERNAL MEDICINE

## 2021-05-03 PROCEDURE — 83735 ASSAY OF MAGNESIUM: CPT

## 2021-05-03 PROCEDURE — C9113 INJ PANTOPRAZOLE SODIUM, VIA: HCPCS | Performed by: INTERNAL MEDICINE

## 2021-05-03 RX ORDER — POTASSIUM CHLORIDE 14.9 MG/ML
20 INJECTION INTRAVENOUS ONCE
Status: COMPLETED | OUTPATIENT
Start: 2021-05-03 | End: 2021-05-03

## 2021-05-03 RX ADMIN — Medication 10 ML: at 05:48

## 2021-05-03 RX ADMIN — SODIUM CHLORIDE, SODIUM LACTATE, POTASSIUM CHLORIDE, AND CALCIUM CHLORIDE 75 ML/HR: 600; 310; 30; 20 INJECTION, SOLUTION INTRAVENOUS at 08:51

## 2021-05-03 RX ADMIN — CEFTRIAXONE 1 G: 1 INJECTION, POWDER, FOR SOLUTION INTRAMUSCULAR; INTRAVENOUS at 16:40

## 2021-05-03 RX ADMIN — DIPHENHYDRAMINE HYDROCHLORIDE 12.5 MG: 50 INJECTION, SOLUTION INTRAMUSCULAR; INTRAVENOUS at 21:20

## 2021-05-03 RX ADMIN — POTASSIUM CHLORIDE 20 MEQ: 14.9 INJECTION, SOLUTION INTRAVENOUS at 08:52

## 2021-05-03 RX ADMIN — Medication 10 ML: at 21:20

## 2021-05-03 RX ADMIN — PANTOPRAZOLE SODIUM 40 MG: 40 INJECTION, POWDER, FOR SOLUTION INTRAVENOUS at 08:44

## 2021-05-03 RX ADMIN — KETOROLAC TROMETHAMINE 7.5 MG: 15 INJECTION, SOLUTION INTRAMUSCULAR; INTRAVENOUS at 12:45

## 2021-05-03 RX ADMIN — AMLODIPINE BESYLATE 5 MG: 5 TABLET ORAL at 08:44

## 2021-05-03 RX ADMIN — Medication 10 ML: at 16:44

## 2021-05-03 NOTE — PROGRESS NOTES
Problem: Mobility Impaired (Adult and Pediatric)  Goal: *Acute Goals and Plan of Care (Insert Text)  Description: STG:  (1.)Ms. Karen Walter will move from supine to sit and sit to supine  with STAND BY ASSIST within 4-7 treatment day(s). Progressing 5/2  (2.)Ms. Karen Walter will transfer from bed to chair and chair to bed with STAND BY ASSIST using the least restrictive device within 4-7 treatment day(s). (3.)Ms. Karen Walter will ambulate with STAND BY ASSIST for 400 feet with the least restrictive device within 4-7 treatment day(s). ________________________________________________________________________________________________      Outcome: Progressing Towards Goal     PHYSICAL THERAPY: Daily Note and AM 5/3/2021  INPATIENT: PT Visit Days : 4  [place] / Product Type: Medicare /       NAME/AGE/GENDER: Daniel Vital is a 68 y.o. female   PRIMARY DIAGNOSIS: Acute gastroenteritis [K52.9] SBO (small bowel obstruction) ([facility]) [facility] obstruction[facility]       ICD-10: Treatment Diagnosis:    · Generalized Muscle Weakness (M62.81)  · Difficulty in walking, Not elsewhere classified (R26.2)   Precaution/Allergies:  Lortab [hydrocodone-acetaminophen] and Acetaminophen      ASSESSMENT:     Ms. Karen Walter showed increased gait distance & improved tolerance to progressive activity. This pt is still very weak & unsteady. She will need continued aggressive efforts to work toward getting pt to return to baseline. HHPT follow up is advised. This section established at most recent assessment   PROBLEM LIST (Impairments causing functional limitations):  1. Decreased Strength  2. Decreased ADL/Functional Activities  3. Decreased Transfer Abilities  4. Decreased Ambulation Ability/Technique  5. Decreased Balance  6. Decreased Activity Tolerance   INTERVENTIONS PLANNED: (Benefits and precautions of physical therapy have been discussed with the patient.)  1. Balance Exercise  2.  Bed Mobility  3. Gait Training  4. Therapeutic Activites  5. Therapeutic Exercise/Strengthening  6. Transfer Training     TREATMENT PLAN: Frequency/Duration: daily for duration of hospital stay  Rehabilitation Potential For Stated Goals: Good     REHAB RECOMMENDATIONS (at time of discharge pending progress):    Placement: It is my opinion, based on this patient's performance to date, that Ms. Kamala Kaur may benefit from [address] after discharge due to the functional deficits listed above that are likely to improve with skilled rehabilitation because he/she has multiple medical issues that affect his/her functional mobility in the community. Equipment:    To be determined, may need a rolling walker              HISTORY:   History of Present Injury/Illness (Reason for Referral):  PER MD NOTE: Nita Ding is a 68 y.o. female, with a history of  has a past medical history of DDD (degenerative disc disease), lumbar (2/19/2020), DM2 (diabetes mellitus, type 2) ([address] (3/4/2014), Gastroesophageal reflux disease without esophagitis (5/22/2015), HLD (hyperlipidemia), HTN (hypertension), Menopause, Sciatica of right side (2/19/2020), and UTI (urinary tract infection) (4/27/2021). She also has no past medical history of Asthma, Autoimmune disease ([address] CAD (coronary artery disease), Cancer ([address] Chronic kidney disease, COPD, Dementia, Heart failure ([address] Liver disease, Other ill-defined conditions(799.89), Psychiatric disorder, PUD (peptic ulcer disease), Seizures ([address] Sleep disorder, Stroke ([address] or Thromboembolus ([address] ,  has a past surgical history that includes hx hysterectomy; hx partial thyroidectomy; hx other surgical; hx colonoscopy (01/25/2010); and neurological procedure unlisted (2014). who presents to the ER with report of two days of persistent lower abdominal pain, nausea, and vomiting.  She was seen in the ER yesterday and was discharged home with diagnosis of UTI after labs and CT abd/pelvis were unremarkable for admitting diagnosis. Pt reports persistent nausea and vomiting since that time and was unable to keep down Keflex that was prescribed for the UTI. Denies any fevers, chills, diarrhea. Past Medical History/Comorbidities:   Ms. Hector Cardenas  has a past medical history of DDD (degenerative disc disease), lumbar (2/19/2020), DM2 (diabetes mellitus, type 2) ([address] (3/4/2014), Gastroesophageal reflux disease without esophagitis (5/22/2015), HLD (hyperlipidemia), HTN (hypertension), Menopause, Sciatica of right side (2/19/2020), and UTI (urinary tract infection) (4/27/2021). She also has no past medical history of Asthma, Autoimmune disease ([address] CAD (coronary artery disease), Cancer ([address] Chronic kidney disease, COPD, Dementia, Heart failure ([address] Liver disease, Other ill-defined conditions(799.89), Psychiatric disorder, PUD (peptic ulcer disease), Seizures ([address] Sleep disorder, Stroke ([address] or Thromboembolus ([address] Ms. Hector Cardenas  has a past surgical history that includes hx hysterectomy; hx partial thyroidectomy; hx other surgical; hx colonoscopy (01/25/2010); and neurological procedure unlisted (2014). Social History/Living Environment:   Home Environment: Private residence  One/Two Story Residence: Two story  Living Alone: No  Support Systems: Spouse/Significant Other/Partner  Patient Expects to be Discharged to[de-identified] Private residence  Current DME Used/Available at Home: None  Prior Level of Function/Work/Activity:  Pt lives at home with spouse, independent with mobility without assistive devices     Number of Personal Factors/Comorbidities that affect the Plan of Care: 0: LOW COMPLEXITY   EXAMINATION:   Most Recent Physical Functioning:   Gross Assessment: 3 to 3-/5 throughout                       Balance:  Sitting: Intact; Without support  Standing: Impaired; With support(IV pole) Bed Mobility:  Supine to Sit: Stand-by assistance  Sit to Supine: (NT)  Scooting: Stand-by assistance       Transfers:  Sit to Stand: Minimum assistance  Stand to Sit: Minimum assistance  Bed to Chair: Minimum assistance(with IV pole)  Duration: 26 Minutes(extra time to work through activity noted)  Gait:     Speed/Sunshine: Delayed  Step Length: Left shortened;Right shortened  Gait Abnormalities: Decreased step clearance;Trunk sway increased  Distance (ft): 120 Feet (ft)  Assistive Device: (IV pole)  Ambulation - Level of Assistance: Minimal assistance  Interventions: Safety awareness training;Verbal cues      Body Structures Involved:  1. Muscles Body Functions Affected:  1. Movement Related Activities and Participation Affected:  1. Mobility  2. Self Care   Number of elements that affect the Plan of Care: 4+: HIGH COMPLEXITY   CLINICAL PRESENTATION:   Presentation: Stable and uncomplicated: LOW COMPLEXITY   CLINICAL DECISION MAKING:   [facility] [place] 6 Clicks   Basic Mobility Inpatient Short Form  How much difficulty does the patient currently have. .. Unable A Lot A Little None   1. Turning over in bed (including adjusting bedclothes, sheets and blankets)? [] 1   [] 2   [x] 3   [] 4   2. Sitting down on and standing up from a chair with arms ( e.g., wheelchair, bedside commode, etc.)   [] 1   [] 2   [x] 3   [] 4   3. Moving from lying on back to sitting on the side of the bed? [] 1   [] 2   [x] 3   [] 4   How much help from another person does the patient currently need. .. Total A Lot A Little None   4. Moving to and from a bed to a chair (including a wheelchair)? [] 1   [] 2   [x] 3   [] 4   5. Need to walk in hospital room? [] 1   [] 2   [x] 3   [] 4   6. Climbing 3-5 steps with a railing? [] 1   [] 2   [x] 3   [] 4   © 2007, Trustees of [employer], under license to [employer]. All rights reserved      Score:  Initial: 18 Most Recent: X (Date: -- )    Interpretation of Tool:  Represents activities that are increasingly more difficult (i.e. Bed mobility, Transfers, Gait).     Medical Necessity: · Patient is expected to demonstrate progress in   · strength and functional technique  ·  to   · decrease assistance required with functional mobility  · . Reason for Services/Other Comments:  · Patient continues to require skilled intervention due to   · Inability to complete functional mobility independently  · . Use of outcome tool(s) and clinical judgement create a POC that gives a: Clear prediction of patient's progress: LOW COMPLEXITY            TREATMENT:   (In addition to Assessment/Re-Assessment sessions the following treatments were rendered)   Pre-treatment Symptoms/Complaints: general weakness  Pain: Initial: numeric scale  Pain Intensity 1: 3  Pain Location 1: Throat  Pain Orientation 1: Mid  Pain Intervention(s) 1: Repositioned  Post Session:  3/10   Therapeutic Activity: (  26 Minutes(extra time to work through activity noted) ):  Therapeutic activities including bed mobility, transfers, progressive gait & cool down LE & UE exercises to improve mobility, strength, balance, coordination and dynamic movement of arm - bilateral, leg - bilateral and core to improve functional endurance & stability. Braces/Orthotics/Lines/Etc:   · IV  · nasogastric tube  Treatment/Session Assessment:    · Response to Treatment:  Slow but steady progress  · Interdisciplinary Collaboration:   o Physical Therapist  o Registered Nurse  · After treatment position/precautions:   o Up in chair  o Bed/Chair-wheels locked  o Bed in low position  o Caregiver at bedside  o Call light within reach  o RN notified  o Family at bedside   · Compliance with Program/Exercises: Will assess as treatment progresses  · Recommendations/Intent for next treatment session: \"Next visit will focus on advancements to more challenging activities and reduction in assistance provided\".   Total Treatment Duration:  PT Patient Time In/Time Out  Time In: 1229  Time Out: Lorin Grant6 Eri Nuñez PT

## 2021-05-03 NOTE — PROGRESS NOTES
Care Management Interventions  PCP Verified by CM: Yes  Palliative Care Criteria Met (RRAT>21 & CHF Dx)?: No(Dx Gastrenteritis Risk 9%)  Mode of Transport at Discharge: Self  Transition of Care Consult (CM Consult): Discharge Planning  Discharge Durable Medical Equipment: No  Physical Therapy Consult: Yes  Occupational Therapy Consult: Yes  Speech Therapy Consult: No  Current Support Network: Lives with Spouse  Confirm Follow Up Transport: Family  The Plan for Transition of Care is Related to the Following Treatment Goals : debility  The Patient and/or Patient Representative was Provided with a Choice of Provider and Agrees with the Discharge Plan?: Yes  Name of the Patient Representative Who was Provided with a Choice of Provider and Agrees with the Discharge Plan: Patient   Freedom of Choice List was Provided with Basic Dialogue that Supports the Patient's Individualized Plan of Care/Goals, Treatment Preferences and Shares the Quality Data Associated with the Providers?: Yes  Discharge Location  Discharge Placement: Home with home health  Interdisciplinary rounds with Dr. Breonna Zambrano, nursing, PT and case management for plan of care. Patient still with intermittent abdominal pain NG tube to suction and PT recommends home health at d/c. Patient wants to d/c home with [facility] when medically stable. CM following.

## 2021-05-03 NOTE — PROGRESS NOTES
Problem: Self Care Deficits Care Plan (Adult)  Goal: *Acute Goals and Plan of Care (Insert Text)  Description: 1. Patient will perform grooming with supervision standing at sink. 2. Patient will perform Upper body dressing with supervision  3. Patient will perform lower body dressing with supervision PROGRESSING 5/3/21  4. Patient will perform upper and lower body bathing with supervision. 5. Patient will perform toilet transfers with CGA. 6. Patient will perform shower transfer with CGA. 7. Patient will participate in 30 + minutes of ADL/ therapeutic exercise/therapeutic activity with min rest breaks to increase activity tolerance for self care. PROGRESSING 5/3/21  8. Patient will perform ADL functional mobility in room with CGA. PROGRESSING 5/3/21    Goals to be achieved in 7 days. Outcome: Progressing Towards Goal     OCCUPATIONAL THERAPY: Daily Note and AM 5/3/2021  INPATIENT: OT Visit Days: 2  [place] / Product Type: Medicare /      NAME/AGE/GENDER: Meredith Quinones is a 68 y.o. female   PRIMARY DIAGNOSIS:  Acute gastroenteritis [K52.9] SBO (small bowel obstruction) ([facility]) SBO (small bowel obstruction) ([facility])     ICD-10: Treatment Diagnosis:    · Generalized Muscle Weakness (M62.81)  · Other lack of cordination (R27.8)   Precautions/Allergies:     Lortab [hydrocodone-acetaminophen] and Acetaminophen      ASSESSMENT:     Ms. Edna Owen presents supine with  present. Pt able to sup>sit with CGA and don underwear and socks at EOB with min A. Pt performed sit<>stand and functional mobility in room and hallway with CGA and HHA. Pt began to fatigue toward the end of session and is currently functioning below her baseline of being active and walking about 3 miles per day. Pt reports getting up and going to the bathroom earlier this AM. Pt left up in recliner with all needs met and in reach. Continue per OT POC.     This section established at most recent assessment PROBLEM LIST (Impairments causing functional limitations):  1. Decreased Strength  2. Decreased ADL/Functional Activities  3. Decreased Ambulation Ability/Technique  4. Decreased Balance  5. Increased Pain  6. Decreased Activity Tolerance   INTERVENTIONS PLANNED: (Benefits and precautions of occupational therapy have been discussed with the patient.)  1. Activities of daily living training  2. Adaptive equipment training  3. Balance training  4. Therapeutic activity  5. Therapeutic exercise     TREATMENT PLAN: Frequency/Duration: Follow patient 3 times per week to address above goals. Rehabilitation Potential For Stated Goals: Good     REHAB RECOMMENDATIONS (at time of discharge pending progress):    Placement: It is my opinion, based on this patient's performance to date, that Ms. Priyanka Miles may benefit from participating in 1-2 additional therapy sessions in order to continue to assess for rehab potential and then make recommendation for disposition at discharge. Equipment:    None at this time              OCCUPATIONAL PROFILE AND HISTORY:   History of Present Injury/Illness (Reason for Referral):  See H&P  Past Medical History/Comorbidities:   Ms. Priyanka Miles  has a past medical history of DDD (degenerative disc disease), lumbar (2/19/2020), DM2 (diabetes mellitus, type 2) ([address] (3/4/2014), Gastroesophageal reflux disease without esophagitis (5/22/2015), HLD (hyperlipidemia), HTN (hypertension), Menopause, Sciatica of right side (2/19/2020), and UTI (urinary tract infection) (4/27/2021). She also has no past medical history of Asthma, Autoimmune disease ([address] CAD (coronary artery disease), Cancer ([address] Chronic kidney disease, COPD, Dementia, Heart failure ([address] Liver disease, Other ill-defined conditions(799.89), Psychiatric disorder, PUD (peptic ulcer disease), Seizures ([address] Sleep disorder, Stroke ([address] or Thromboembolus ([address]   Ms. Priyanka Miles  has a past surgical history that includes hx hysterectomy; hx partial thyroidectomy; hx other surgical; hx colonoscopy (01/25/2010); and neurological procedure unlisted (2014).   Social History/Living Environment:   Home Environment: Private residence  One/Two Story Residence: Two story  Living Alone: No  Support Systems: Spouse/Significant Other/Partner  Patient Expects to be Discharged to[de-identified] Private residence  Current DME Used/Available at Home: None  Prior Level of Function/Work/Activity:  Independent      Number of Personal Factors/Comorbidities that affect the Plan of Care: Brief history (0):  LOW COMPLEXITY   ASSESSMENT OF OCCUPATIONAL PERFORMANCE[de-identified]   Activities of Daily Living:   Basic ADLs (From Assessment) Complex ADLs (From Assessment)   Feeding: Independent  Oral Facial Hygiene/Grooming: Setup, Contact guard assistance  Bathing: Minimum assistance  Upper Body Dressing: Minimum assistance  Lower Body Dressing: Minimum assistance  Toileting: Minimum assistance     Grooming/Bathing/Dressing Activities of Daily Living     Cognitive Retraining  Safety/Judgement: Awareness of environment                       Bed/Mat Mobility  Supine to Sit: Stand-by assistance  Sit to Stand: Contact guard assistance  Stand to Sit: Contact guard assistance  Bed to Chair: Contact guard assistance     Most Recent Physical Functioning:   Gross Assessment:                  Posture:  Posture (WDL): Within defined limits  Balance:    Bed Mobility:  Supine to Sit: Stand-by assistance  Wheelchair Mobility:     Transfers:  Sit to Stand: Contact guard assistance  Stand to Sit: Contact guard assistance  Bed to Chair: Contact guard assistance            Patient Vitals for the past 6 hrs:   BP BP Patient Position SpO2 Pulse   05/03/21 0821 (!) 166/66 At rest 93 % 77       Mental Status  Neurologic State: Alert  Orientation Level: Oriented X4  Cognition: Appropriate decision making  Perception: Appears intact  Perseveration: No perseveration noted  Safety/Judgement: Awareness of environment Physical Skills Involved:  1. Range of Motion  2. Balance  3. Strength Cognitive Skills Affected (resulting in the inability to perform in a timely and safe manner): 1. none Psychosocial Skills Affected:  1. Environmental Adaptation   Number of elements that affect the Plan of Care: 3-5:  MODERATE COMPLEXITY   CLINICAL DECISION MAKIN[address] [place] [address] AM-PAC 6 Clicks   Daily Activity Inpatient Short Form  How much help from another person does the patient currently need. .. Total A Lot A Little None   1. Putting on and taking off regular lower body clothing? [] 1   [] 2   [x] 3   [] 4   2. Bathing (including washing, rinsing, drying)? [] 1   [] 2   [x] 3   [] 4   3. Toileting, which includes using toilet, bedpan or urinal?   [] 1   [] 2   [x] 3   [] 4   4. Putting on and taking off regular upper body clothing? [] 1   [] 2   [x] 3   [] 4   5. Taking care of personal grooming such as brushing teeth? [] 1   [] 2   [x] 3   [] 4   6. Eating meals? [] 1   [] 2   [] 3   [x] 4   © , Trustees of [address] [place] [address], under license to [employer]. All rights reserved      Score:  Initial: 19 Most Recent: 19 (Date: 5/3/2021)    Interpretation of Tool:  Represents activities that are increasingly more difficult (i.e. Bed mobility, Transfers, Gait). Use of outcome tool(s) and clinical judgement create a POC that gives a: LOW COMPLEXITY         TREATMENT:   (In addition to Assessment/Re-Assessment sessions the following treatments were rendered)     Pre-treatment Symptoms/Complaints:    Pain: Initial:   Pain Intensity 1: 0 0 Post Session:  0     Self Care: (10 min): Procedure(s) (per grid) utilized to improve and/or restore self-care/home management as related to dressing. Required minimal verbal and manual cueing to facilitate activities of daily living skills and compensatory activities. Therapeutic Activity (  15 Minutes):   Therapeutic activities per grid below to improve mobility, strength and balance. Required minimal verbal and manual cues to promote functional mobility and safety. Braces/Orthotics/Lines/Etc:   · IV  · O2 Device: None (Room air)  Treatment/Session Assessment:    · Response to Treatment:  pt up in room and to bathroom tolerated well  · Interdisciplinary Collaboration:   o Physical Therapist  o Occupational Therapist  o Registered Nurse  · After treatment position/precautions:   o Up in chair  o Bed/Chair-wheels locked  o Bed in low position  o Call light within reach  o RN notified  o Family at bedside   · Compliance with Program/Exercises: Compliant all of the time, Will assess as treatment progresses. · Recommendations/Intent for next treatment session: \"Next visit will focus on advancements to more challenging activities and reduction in assistance provided\".   Total Treatment Duration:  OT Patient Time In/Time Out  Time In: 0910  Time Out: [address], OT

## 2021-05-03 NOTE — PROGRESS NOTES
Problem: Falls - Risk of  Goal: *Absence of Falls  Description: Document Burrell Canavan Fall Risk and appropriate interventions in the flowsheet.   Outcome: Progressing Towards Goal  Note: Fall Risk Interventions:  Mobility Interventions: Communicate number of staff needed for ambulation/transfer         Medication Interventions: Patient to call before getting OOB    Elimination Interventions: Bed/chair exit alarm              Problem: Patient Education: Go to Patient Education Activity  Goal: Patient/Family Education  Outcome: Progressing Towards Goal     Problem: Patient Education: Go to Patient Education Activity  Goal: Patient/Family Education  Outcome: Progressing Towards Goal     Problem: Patient Education: Go to Patient Education Activity  Goal: Patient/Family Education  Outcome: Progressing Towards Goal     Problem: Patient Education: Go to Patient Education Activity  Goal: Patient/Family Education  Outcome: Progressing Towards Goal

## 2021-05-03 NOTE — PROGRESS NOTES
H&P/Consult Note/Progress Note/Office Note:   Meredith Quinones  MRN: [id number]  PGT:7/31/4788  Age:80 y.o.    HPI: Meredith Quinones is a 68 y.o. female who was admitted on 4/27/20 by the Hospitalists   after she presented to the ER a 2nd time in 2 days for with constant and progressive N/V/lower abd pain for 2 days. Nothing made symptoms better or worse. No associated fever  She is s/p hysterectomy approx 2011    CT on 4/28/21 showed thickening of focal small bowel wall with proximal dilation and likely partial SBO  Dr Harshil Flores was consulted for SBO on 4/29/21. Her symptoms worsened today and she had the below abd Xray with AF levels and signs of persistent SBO       4/28/21 CT abd/pelvis with oral and IV contrast  Hx:  Follow-up of abnormal small bowel with subacute progressive worsening generalized abdominal distention and pain. Spontaneous bacterial peritonitis.      FINDINGS: Partially included lung bases demonstrate a new small posterior  layering pleural effusion on the right and mild infiltrates/atelectasis in both  posterior lower lungs. No dense consolidation. Nonspecific mild wall thickening  of distal esophagus and probable tiny hiatal hernia.     Abdomen: Unchanged indeterminate right adrenal nodule, most likely incidental  adenoma in the absence of clinical suspicion. The kidneys, adrenals, spleen,  liver, pancreas, gallbladder have not changed in 2 days. Right renal cyst again  noted. Diffuse atherosclerotic changes again noted of aorta. Contrast does  opacify major vessels. No evidence of free air, large volume ascites, or focal  fluid collection in the abdomen. There is increased tracer small volume of  abdominopelvic ascites.     GI tract demonstrates contrast from level of stomach to rectum. Stomach,  duodenum, large bowel, distal ileum are not distended. There is a focal right  lower quadrant small bowel loop with wall thickening and surrounding  inflammatory stranding.  Proximal to this there are multiple mildly dilated small  bowel loops which have slightly increased since prior. Nonspecific wall thickening is also noted scattered and several other small bowel loops. nura: No developing abscess, lymphadenopathy or mass. Small volume ascites.     Bones: No acute osseous lesions. IMPRESSION  1. Abnormal small bowel, most prominently involving focal wall thickening and inflammation in a right lower quadrant loop. 2. Dilatation of bowel loops proximal to this, likely partial small bowel obstruction. Contrast does progress to the level of the rectum. 3. Increased ascites, small volume. 4. Right small pleural effusion. Mild bibasilar lung infiltrates versus atelectasis        5/1/21 abd Xray, 2views  Hx:  Abdominal pain and bloating    Increased small bowel distention. Multiple air-fluid levels are now present. Contrast is present in the colon from prior CT scan. There is no free air. There are no renal calculi. The lung bases are clear.     IMPRESSION  Increased small bowel distention, likely high-grade obstruction           Past Medical History:   Diagnosis Date    DDD (degenerative disc disease), lumbar 2/19/2020    DM2 (diabetes mellitus, type 2) ([facility] 3/4/2014    Gastroesophageal reflux disease without esophagitis 5/22/2015    HLD (hyperlipidemia)     HTN (hypertension)     Menopause     Sciatica of right side 2/19/2020    UTI (urinary tract infection) 4/27/2021     Past Surgical History:   Procedure Laterality Date    HX COLONOSCOPY  01/25/2010    HX HYSTERECTOMY      10 years ago    HX OTHER SURGICAL      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  HX PARTIAL THYROIDECTOMY      Lobectomy for benign mass.     NEUROLOGICAL PROCEDURE UNLISTED  2014    [place] [place]     Current Facility-Administered Medications   Medication Dose Route Frequency    labetaloL (NORMODYNE;TRANDATE) injection 10 mg  10 mg IntraVENous Q4H PRN    pantoprazole (PROTONIX) 40 mg in 0.9% sodium chloride 10 mL injection  40 mg IntraVENous DAILY    cloNIDine (CATAPRES) 0.1 mg/24 hr patch 1 Patch  1 Patch TransDERmal Q7D    amLODIPine (NORVASC) tablet 5 mg  5 mg Oral DAILY    insulin lispro (HUMALOG) injection   SubCUTAneous TIDAC    lactated Ringers infusion  75 mL/hr IntraVENous CONTINUOUS    cefTRIAXone (ROCEPHIN) 1 g in 0.9% sodium chloride (MBP/ADV) 50 mL MBP  1 g IntraVENous Q24H    ketorolac (TORADOL) injection 7.5 mg  7.5 mg IntraVENous Q6H PRN    phenol throat spray (CHLORASEPTIC) 1 Spray  1 Spray Oral PRN    [Held by provider] enoxaparin (LOVENOX) injection 30 mg  30 mg SubCUTAneous DAILY    diphenhydrAMINE (BENADRYL) injection 12.5 mg  12.5 mg IntraVENous Q6H PRN    [Held by provider] metFORMIN (GLUCOPHAGE) tablet 1,000 mg  1,000 mg Oral BID WITH MEALS    rosuvastatin (CRESTOR) tablet 20 mg  20 mg Oral QHS    sodium chloride (NS) flush 5-40 mL  5-40 mL IntraVENous Q8H    sodium chloride (NS) flush 5-40 mL  5-40 mL IntraVENous PRN    acetaminophen (TYLENOL) tablet 650 mg  650 mg Oral Q6H PRN    Or    acetaminophen (TYLENOL) suppository 650 mg  650 mg Rectal Q6H PRN    polyethylene glycol (MIRALAX) packet 17 g  17 g Oral DAILY PRN    promethazine (PHENERGAN) tablet 12.5 mg  12.5 mg Oral Q6H PRN    Or    ondansetron (ZOFRAN) injection 4 mg  4 mg IntraVENous Q6H PRN     Lortab [hydrocodone-acetaminophen] and Acetaminophen  Social History     Socioeconomic History    Marital status:      Spouse name: Not on file    Number of children: Not on file    Years of education: Not on file    Highest education level: Not on file   Tobacco Use    Smoking status: Never Smoker    Smokeless tobacco: Never Used   Substance and Sexual Activity    Alcohol use: No    Drug use: No    Sexual activity: Yes     Partners: Male     Birth control/protection: Surgical   Other Topics Concern     Social History     Tobacco Use   Smoking Status Never Smoker   Smokeless Tobacco Never Used     Family History Problem Relation Age of Onset    Heart Disease Father 76    Kidney Disease Father         hepatitis, post-surgery (tranfusion)    Cancer Mother [de-identified]        Breast    Breast Cancer Mother 80    Alzheimer Sister [de-identified]    Arthritis-osteo Sister     Arthritis-osteo Brother         Back surgery    Diabetes Neg Hx      ROS: The patient has no difficulty with chest pain or shortness of breath. No fever or chills. Comprehensive review of systems was otherwise unremarkable except as noted above. Physical Exam:   Visit Vitals  BP (!) 151/74 (BP 1 Location: Right arm, BP Patient Position: At rest)   Pulse 81   Temp 98.6 °F (37 °C)   Resp 20   Ht 5' 4\" (1.626 m)   Wt 134 lb (60.8 kg)   SpO2 96%   BMI 23.00 kg/m²     Vitals:    05/02/21 1436 05/02/21 1900 05/02/21 2307 05/03/21 0238   BP: (!) 155/84 (!) 176/71 (!) 152/73 (!) 151/74   Pulse: 100 86 81 81   Resp: 20 20 20 20   Temp: 98.1 °F (36.7 °C) 98.3 °F (36.8 °C) 98.3 °F (36.8 °C) 98.6 °F (37 °C)   SpO2: 95% 96% 96% 96%   Weight:       Height:         05/02 1901 - 05/03 0700  In: 1590 [I.V.:1590]  Out: 800   05/01 0701 - 05/02 1900  In: 1098 [I.V.:1098]  Out: 200     Constitutional: Alert, oriented, cooperative patient in no acute distress; appears stated age    Eyes:Sclera are clear. EOMs intact  ENMT: no external lesions gross hearing normal; no obvious neck masses, no ear or lip lesions, nares normal; +NGT with bilious output  CV: RRR. Normal perfusion  Resp: No JVD. Breathing is  non-labored; no audible wheezing. GI:  Less distended but still tympanetic; no peritoneal signs     Musculoskeletal: unremarkable with normal function. No embolic signs or cyanosis.    Neuro:  Oriented; moves all 4; no focal deficits  Psychiatric: normal affect and mood, no memory impairment    Recent vitals (if inpt):  Patient Vitals for the past 24 hrs:   BP Temp Pulse Resp SpO2   05/03/21 0238 (!) 151/74 98.6 °F (37 °C) 81 20 96 %   05/02/21 2307 (!) 152/73 98.3 °F (36.8 °C) 81 20 96 %   05/02/21 1900 (!) 176/71 98.3 °F (36.8 °C) 86 20 96 %   05/02/21 1436 (!) 155/84 98.1 °F (36.7 °C) 100 20 95 %   05/02/21 1139 (!) 174/73 98.4 °F (36.9 °C) (!) 107 18 94 %   05/02/21 1041 (!) 159/66  (!) 114 18    05/02/21 0808 (!) 179/73 98.1 °F (36.7 °C) 98 16 92 %       Amount and/or Complexity of Data Reviewed and Analyzed:  I reviewed and analyzed all of the unique labs and radiologic studies that are shown below as well as any that are in the HPI, and any that are in the expanded problem list below  *Each unique test, order, or document contributes to the combination of 2 or combination of 3 in Category 1 below. For this visit I also reviewed old records and prior notes. Recent Labs     05/03/21  0510 05/02/21  0828   WBC 11.1 13.0*   HGB 11.9 13.2    238    136   K 3.7 4.0    104   CO2 22 22   BUN 15 12   CREA 0.36* 0.45*   * 135*   TBILI  --  0.5   ALT  --  19   AP  --  46*     Review of most recent CBC  Lab Results   Component Value Date/Time    WBC 11.1 05/03/2021 05:10 AM    HGB 11.9 05/03/2021 05:10 AM    HCT 33.1 (L) 05/03/2021 05:10 AM    PLATELET 414 59/72/5333 05:10 AM    MCV 93.5 05/03/2021 05:10 AM       Review of most recent BMP  Lab Results   Component Value Date/Time    Sodium 137 05/03/2021 05:10 AM    Potassium 3.7 05/03/2021 05:10 AM    Chloride 104 05/03/2021 05:10 AM    CO2 22 05/03/2021 05:10 AM    Anion gap 11 05/03/2021 05:10 AM    Glucose 113 (H) 05/03/2021 05:10 AM    BUN 15 05/03/2021 05:10 AM    Creatinine 0.36 (L) 05/03/2021 05:10 AM    BUN/Creatinine ratio 29 (H) 04/21/2021 10:34 AM    GFR est AA >60 05/03/2021 05:10 AM    GFR est non-AA >60 05/03/2021 05:10 AM    Calcium 8.0 (L) 05/03/2021 05:10 AM       Review of most recent LFTs (and lipase if done)  Lab Results   Component Value Date/Time    ALT (SGPT) 19 05/02/2021 08:28 AM    AST (SGOT) 18 05/02/2021 08:28 AM    Alk.  phosphatase 46 (L) 05/02/2021 08:28 AM    Bilirubin, direct 0.11 04/21/2021 10:34 AM    Bilirubin, total 0.5 05/02/2021 08:28 AM     Lab Results   Component Value Date/Time    Lipase 48 (L) 04/27/2021 05:54 PM       Lab Results   Component Value Date/Time    Bilirubin, direct 0.11 04/21/2021 10:34 AM    Troponin-I <0.05 12/30/2010 05:07 AM       Review of most recent HgbA1c  Lab Results   Component Value Date/Time    Hemoglobin A1c 6.4 (H) 04/21/2021 10:34 AM       Nutritional assessment screen for wound healing issues:  Lab Results   Component Value Date/Time    Protein, total 5.4 (L) 05/02/2021 08:28 AM    Albumin 2.9 (L) 05/02/2021 08:28 AM       @lastcovr@  XR Results (most recent):  Results from [place] encounter on 04/27/21   XR ABD (KUB)    Narrative Abdominal film for feeding tube placement, 5/2/2021    History: Feeding tube placement. Comparison: KUB 5/1/2021. Findings: 2 5 user committed for evaluation. Feeding tube position is not  significant changed with the tip and side port below the gastroesophageal  junction grossly good position. Gas dilation of small bowel is once again seen. This is not significant change from the more recent study although does appear  improved from a preceding study. Assessment for free air is limited although no  obvious free air is seen. Oral contrast is seen in the right colon and rectum. Impression 1. Good position of feeding tube. 2. Grossly stable small bowel dilation with oral contrast identified within the  right colon and rectum. CT Results (most recent):  Results from Hospital Encounter encounter on 04/27/21   CT ABD PELV W CONT    Narrative CT of the Abdomen and Pelvis with contrast    CLINICAL INDICATION:  Follow-up of abnormal small bowel with subacute  progressive worsening generalized abdominal distention and pain. Spontaneous  bacterial peritonitis. COMPARISON: 4/26/2021    TECHNIQUE: Automated exposure Control was used.  Multiple axial images were  obtained through the abdomen and pelvis after intravenous injection of 125cc of  isovue 370 IV contrast to further evaluate vessels and organs. Coronal  reformatted images were done for further evaluation of bones and organs. Oral  contrast was given for further evaluation of GI tract and adjacent organs. FINDINGS: Partially included lung bases demonstrate a new small posterior  layering pleural effusion on the right and mild infiltrates/atelectasis in both  posterior lower lungs. No dense consolidation. Nonspecific mild wall thickening  of distal esophagus and probable tiny hiatal hernia. Abdomen: Unchanged indeterminate right adrenal nodule, most likely incidental  adenoma in the absence of clinical suspicion. The kidneys, adrenals, spleen,  liver, pancreas, gallbladder have not changed in 2 days. Right renal cyst again  noted. Diffuse atherosclerotic changes again noted of aorta. Contrast does  opacify major vessels. No evidence of free air, large volume ascites, or focal  fluid collection in the abdomen. There is increased tracer small volume of  abdominopelvic ascites. GI tract demonstrates contrast from level of stomach to rectum. Stomach,  duodenum, large bowel, distal ileum are not distended. There is a focal right  lower quadrant small bowel loop with wall thickening and surrounding  inflammatory stranding. Proximal to this there are multiple mildly dilated small  bowel loops which have slightly increased since prior. Nonspecific wall  thickening is also noted scattered and several other small bowel loops. Pelvis: No developing abscess, lymphadenopathy or mass. Small volume ascites. Bones: No acute osseous lesions. Impression 1. Abnormal small bowel, most prominently involving focal wall thickening and  inflammation in a right lower quadrant loop. 2. Dilatation of bowel loops proximal to this, likely partial small bowel  obstruction. Contrast does progress to the level of the rectum.   3. Increased ascites, small volume. 4. Right small pleural effusion. Mild bibasilar lung infiltrates versus  atelectasis. US Results (most recent):  No results found for this or any previous visit. Admission date (for inpatients): 4/27/2021   * No surgery found *  * No surgery found *        ASSESSMENT/PLAN:  Problem List  Date Reviewed: 4/21/2021          Codes Class Noted    Hypomagnesemia ICD-10-CM: E83.42  ICD-9-CM: 275.2  5/1/2021        * (Principal) SBO (small bowel obstruction) ([facility] ICD-10-CM: V88.814  ICD-9-CM: 560.9  4/27/2021        Leukocytosis ICD-10-CM: O60.054  ICD-9-CM: 288.60  4/27/2021        UTI (urinary tract infection) ICD-10-CM: N39.0  ICD-9-CM: 599.0  4/27/2021        Lumbar radiculopathy ICD-10-CM: M54.16  ICD-9-CM: 724.4  2/19/2020        DDD (degenerative disc disease), lumbar ICD-10-CM: M51.36  ICD-9-CM: 722.52  2/19/2020        Gastroesophageal reflux disease without esophagitis ICD-10-CM: K21.9  ICD-9-CM: 530.81  6/7/2017        Type 2 diabetes mellitus without complication, without long-term current use of insulin ([facility] ICD-10-CM: E11.9  ICD-9-CM: 250.00  11/29/2016        Mixed hyperlipidemia ICD-10-CM: E78.2  ICD-9-CM: 272.2  11/29/2016        Osteopenia of multiple sites ICD-10-CM: M85.89  ICD-9-CM: 733.90  11/29/2016    Overview Signed 4/21/2021  8:19 AM by Prince Jean Baptiste MD     Not compliant with multiple BMD orders.               Essential hypertension ICD-10-CM: I10  ICD-9-CM: 401.9  2/23/2016            Principal Problem:    [facility]BO (small bowel obstruction) ([facility] (4/27/2021)    Active Problems:    Essential hypertension (2/23/2016)      Type 2 diabetes mellitus without complication, without long-term current use of insulin ([facility] (11/29/2016)      Mixed hyperlipidemia (11/29/2016)      Gastroesophageal reflux disease without esophagitis (6/7/2017)      Leukocytosis (4/27/2021)      UTI (urinary tract infection) (4/27/2021)      Hypomagnesemia (5/1/2021)           Number and Complexity of Problems addressed and   Risks of complications and/or morbidity of management      Partial SBO  2 BMs but no flatus and still has abd tympany  WBC has normalized    Continue inpt admission  Continue NGT decompression  Continue Bowel rest/NPO    Maint IVF  Serial exam  Watch renal function daily with IV meds and hydration    I spoke to Dr Nabor Alexis who will resume care and see her tomorrow  Likely repeat CT without contrast in 2-4 days      Hypomagnesemia-->resolved  Replaced Mg++      Level of MDM (2/3 elements below)  Number and Complexity of Problems Addressed Amount and/or Complexity of Data to be Reviewed and Analyzed  *Each unique test, order, or document contributes to the combination of 2 or combination of 3 in Category 1 below.  Risk of Complications and/or Morbidity or Mortality of pt Management     62093  37550 SF Minimal  1self-limited or minor problem Minimal or none Minimal risk of morbidity from additional diagnostic testing or Rx   82438  59990 Low Low  2or more self-limited or minor problems;    or  1stable chronic illness;    or  4ALMGT, uncomplicated illness or injury   Limited  (Must meet the requirements of at least 1 of the 2 categories)  Category 1: Tests and documents   Any combination of 2 from the following:  Review of prior external note(s) from each unique source*;  review of the result(s) of each unique test*;   ordering of each unique test*    or   Category 2: Assessment requiring an independent historian(s)  (For the categories of independent interpretation of tests and discussion of management or test interpretation, see moderate or high) Low risk of morbidity from additional diagnostic testing or treatment     02012  03510 Mod Moderate  1or more chronic illnesses with exacerbation, progression, or side effects of treatment;    or  2or more stable chronic illnesses;    or  1undiagnosed new problem with uncertain prognosis;    or  1acute illness with systemic symptoms;    or  1acute complicated injury   Moderate  (Must meet the requirements of at least 1 out of 3 categories)  Category 1: Tests, documents, or independent historian(s)  Any combination of 3 from the following:   Review of prior external note(s) from each unique source*;  Review of the result(s) of each unique test*;  Ordering of each unique test*;  Assessment requiring an independent historian(s)    or  Category 2: Independent interpretation of tests   Independent interpretation of a test performed by another physician/other qualified health care professional (not separately reported);     or  Category 3: Discussion of management or test interpretation  Discussion of management or test interpretation with external physician/other qualified health care professional/appropriate source (not separately reported)   Moderate risk of morbidity from additional diagnostic testing or treatment  Examples only:  Prescription drug management   Decision regarding minor surgery with identified patient or procedure risk factors  Decision regarding elective major surgery without identified patient or procedure risk factors   Diagnosis or treatment significantly limited by social determinants of health       11995  01680 High High  1or more chronic illnesses with severe exacerbation, progression, or side effects of treatment;    or  1 acute or chronic illness or injury that poses a threat to life or bodily function   Extensive  (Must meet the requirements of at least 2 out of 3 categories)  Category 1: Tests, documents, or independent historian(s)  Any combination of 3 from the following:   Review of prior external note(s) from each unique source*;  Review of the result(s) of each unique test*;   Ordering of each unique test*;   Assessment requiring an independent historian(s)    or   Category 2: Independent interpretation of tests   Independent interpretation of a test performed by another physician/other qualified health care professional (not separately reported);     or  Category 3: Discussion of management or test interpretation  Discussion of management or test interpretation with external physician/other qualified health care professional/appropriate source (not separately reported)   High risk of morbidity from additional diagnostic testing or treatment  Examples only:  Drug therapy requiring intensive monitoring for toxicity  Decision regarding elective major surgery with identified patient or procedure risk factors  Decision regarding emergency major surgery  Decision regarding hospitalization  Decision not to resuscitate or to de-escalate care because of poor prognosis             I have personally performed a face-to-face diagnostic evaluation and management  service on this patient. I have independently seen the patient. I have independently obtained the above history from the patient/family. I have independently examined the patient with above findings. I have independently reviewed data/labs for this patient and developed the above plan of care (MDM). Signed: Varsha Gonzalez.  Mark Kelly MD, FACS

## 2021-05-03 NOTE — PROGRESS NOTES
[facility] Hospitalist Service Progress Note    Briefly, 77-year-old female with significant past medical history of diabetes mellitus and GERD presented to the hospital with abdominal pain, nausea and vomiting. She was initially diagnosed with UTI and she was treated with ceftriaxone. In addition, her initial CT was concerning for small bowel obstruction which was reviewed by surgeon. Patient was treated for ileus and she had 2 bowel movement. Patient improved but on the night of April 30, patient started having some belly pain and abdominal distention. X-ray on May 1 was consistent with SBO. General surgery consulted. Patient started on NG tube with low intermittent suction on May 1. May 1: Overnight patient developed severe abdominal pain and her abdominal distention got worse. She denies any nausea or vomiting at present. Denies any fever, chills, chest pain, palpitations. Rest review of system negative except mentioned above. May 2: Patient found to have SBO yesterday. NG tube was placed with low intermittent suction. Also patient blood pressure is persistently elevated and she feels palpitation after hydralazine IV in spite with low dose. Still have some abdominal distention but no significant pain. Denies any chest pain, palpitations or shortness of breath. Denies any nausea or vomiting. As per patient her nausea is much better. May 3: As per patient she is not improving or getting worse. Still having intermittent abdominal pain. Denies any nausea or vomiting. NG tube on suction. Rest review of system negative except mentioned above. Assessment / Plan:    Distended loops of bowel, possible small bowel obstruction  Initially treated for ileus. Now currently most likely SBO. General surgery consulted. NPO. NG tube with low intermittent suction. IV fluid hydration. Replete potassium for level greater than 4. TSH within normal limit. Conservative management.     May 2: Consistent with SBO. On conservative management with NG tube at low intermittent suction. May 3: Continue conservative management. General surgery on board. E. coli UTI: Patient was treated on ceftriaxone for 4 days but patient still symptomatic so we will give 3 more days of ceftriaxone. Diabetes mellitus type 2  Sliding scale insulin. Significantly elevated blood pressure: Hydralazine ordered. After hydralazine patient felt that her heart rate was racing and her heart rate was 84. We will decrease the dose of hydralazine. Amlodipine started. May 2: Stop hydralazine. Given patient is n.p.o. and on NG tube suction so p.o. medication would not be helpful. Discussed side effects and benefit and will start patient on low-dose clonidine patch. As needed labetalol. We will continue to monitor. May 3: Much better controlled on clonidine patch. Will monitor. SCDs for DVT prophylaxis given unsure if patient will need surgery. Timing of pharmacological DVT prophylaxis as per surgeon. Patient is AOx3. Patient  was present at bedside. Passively discuss the plan with patient and patient . Both verbalized understanding that her condition meditated in spite of treatment. Objective:  Visit Vitals  BP (!) 152/62 (BP 1 Location: Right arm, BP Patient Position: At rest)   Pulse 80   Temp 98.6 °F (37 °C)   Resp 19   Ht 5' 4\" (1.626 m)   Wt 60.8 kg (134 lb)   SpO2 95%   BMI 23.00 kg/m²                 Physical Exam:  General: Mildly ill-appearing owing to abdominal pain  HEENT: Extraocular muscle seems intact. Mucous membranes slightly dry.   Neck: Supple , no JVD   Lungs: Clear to auscultation bilaterally   Cardiovascular: Regular rate and rhythm with normal S1 and S2   Abdomen: Soft, distended with some tenderness, bowel sounds are hyperactive some  extremities: No cyanosis clubbing or edema   Neuro: Moving all 4 extremities, speech normal l, A&O x3   Psych: Normal affect Procedures done this admission:  * No surgery found *    All Micro Results     Procedure Component Value Units Date/Time    CULTURE, BLOOD [[id number]] Collected: 04/27/21 2036    Order Status: Completed Specimen: Blood Updated: 05/02/21 0742     Special Requests: --        RIGHT  Antecubital       Culture result: NO GROWTH 5 DAYS       CULTURE, BLOOD [[id number]] Collected: 04/27/21 1854    Order Status: Completed Specimen: Blood Updated: 05/02/21 0742     Special Requests: --        NO SPECIAL REQUESTS  LEFT  HAND       Culture result: NO GROWTH 5 DAYS             SARS-CoV-2 Lab Results  \"Novel Coronavirus\" Test: No results found for: COV2NT   \"Emergent Disease\" Test: No results found for: EDPR  \"SARS-COV-2\" Test: No results found for: XGCOVT  \"Precision Labs\" Test: No results found for: RSLT  Rapid Test: No results found for: COVR         Labs: Results:       BMP, Mg, Phos Recent Labs     05/03/21  0510 05/02/21  0828 05/01/21  0755    136 135*   K 3.7 4.0 3.3*    104 102   CO2 22 22 26   AGAP 11 10 7   BUN 15 12 13   CREA 0.36* 0.45* 0.49*   CA 8.0* 8.1* 8.0*   * 135* 195*   MG 2.0 2.1 1.6*   PHOS 3.1  --   --       CBC Recent Labs     05/03/21  0510 05/02/21  0828 05/01/21  0755   WBC 11.1 13.0* 8.0   RBC 3.54* 3.99* 3.66*   HGB 11.9 13.2 12.2   HCT 33.1* 38.0 34.4*    238 210   GRANS 75 83* 77   LYMPH 12* 7* 11*   EOS 2 1 2   MONOS 10 9 9   BASOS 0 0 0   IG 1 0 0   ANEU 8.3* 10.8* 6.2   ABL 1.4 0.9 0.9   TAMMY 0.2 0.1 0.1   ABM 1.2 1.2 0.8   ABB 0.0 0.0 0.0   AIG 0.1 0.0 0.0      LFT Recent Labs     05/02/21  0828 05/01/21  0755   ALT 19 11*   AP 46* 41*   TP 5.4* 5.2*   ALB 2.9* 2.6*   GLOB 2.5 2.6   AGRAT 1.2 1.0*      Cardiac Testing Lab Results   Component Value Date/Time    Troponin-I <0.05 12/30/2010 05:07 AM    Troponin-I <0.05 12/30/2010 02:12 AM      Coagulation Tests No results found for: PTP, INR, APTT, INREXT, INREXT   A1c Lab Results   Component Value Date/Time Hemoglobin A1c 6.4 (H) 04/21/2021 10:34 AM    Hemoglobin A1c 6.7 (H) 10/02/2020 10:11 AM    Hemoglobin A1c 7.7 (H) 06/30/2020 12:00 PM      Lipid Panel Lab Results   Component Value Date/Time    Cholesterol, total 116 04/21/2021 10:34 AM    HDL Cholesterol 63 04/21/2021 10:34 AM    LDL, calculated 39 04/21/2021 10:34 AM    LDL, calculated 34 06/30/2020 12:00 PM    VLDL, calculated 14 04/21/2021 10:34 AM    VLDL, calculated 23 06/30/2020 12:00 PM    Triglyceride 69 04/21/2021 10:34 AM    CHOL/HDL Ratio 4.1 05/26/2016 08:58 AM      Thyroid Panel Lab Results   Component Value Date/Time    TSH 2.700 04/21/2021 10:34 AM    TSH 2.900 09/03/2019 08:46 AM        Most Recent UA Lab Results   Component Value Date/Time    Color Yellow 09/03/2019 08:46 AM    Appearance Clear 09/03/2019 08:46 AM    pH (UA) 5.0 06/07/2017 08:52 AM    Protein Negative 06/07/2017 08:52 AM    Glucose Negative 06/07/2017 08:52 AM    Ketone Negative 06/07/2017 08:52 AM    Bilirubin Negative 06/07/2017 08:52 AM    Blood Negative 06/07/2017 08:52 AM    Urobilinogen 0.2 E.U./dL 06/07/2017 08:52 AM    Nitrites Positive (A) 06/07/2017 08:52 AM    Leukocyte Esterase Trace (A) 06/07/2017 08:52 AM    WBC 5-10 04/26/2021 12:43 PM    RBC 0-3 04/26/2021 12:43 PM    Epithelial cells 0-3 04/26/2021 12:43 PM    Bacteria 4+ (H) 04/26/2021 12:43 PM    Casts 3-5 04/26/2021 12:43 PM          Valentina Spencer MD  5/3/2021

## 2021-05-03 NOTE — PROGRESS NOTES
H&P/Consult Note/Progress Note/Office Note:   Koby Gamboa  MRN: [id number]  MXM:[birth year]  Age:80 y.o.    HPI: Koby Gamboa is a 68 y.o. female who was admitted on 4/27/20 by the Hospitalists   after she presented to the ER a 2nd time in 2 days for with constant and progressive N/V/lower abd pain for 2 days. Nothing made symptoms better or worse. No associated fever  She is s/p hysterectomy approx 2011    CT on 4/28/21 showed thickening of focal small bowel wall with proximal dilation and likely partial SBO  Dr Vero Hickman was consulted for SBO on 4/29/21. Her symptoms worsened today and she had the below abd Xray with AF levels and signs of persistent SBO       4/28/21 CT abd/pelvis with oral and IV contrast  Hx:  Follow-up of abnormal small bowel with subacute progressive worsening generalized abdominal distention and pain. Spontaneous bacterial peritonitis.      FINDINGS: Partially included lung bases demonstrate a new small posterior  layering pleural effusion on the right and mild infiltrates/atelectasis in both  posterior lower lungs. No dense consolidation. Nonspecific mild wall thickening  of distal esophagus and probable tiny hiatal hernia.     Abdomen: Unchanged indeterminate right adrenal nodule, most likely incidental  adenoma in the absence of clinical suspicion. The kidneys, adrenals, spleen,  liver, pancreas, gallbladder have not changed in 2 days. Right renal cyst again  noted. Diffuse atherosclerotic changes again noted of aorta. Contrast does  opacify major vessels. No evidence of free air, large volume ascites, or focal  fluid collection in the abdomen. There is increased tracer small volume of  abdominopelvic ascites.     GI tract demonstrates contrast from level of stomach to rectum. Stomach,  duodenum, large bowel, distal ileum are not distended. There is a focal right  lower quadrant small bowel loop with wall thickening and surrounding  inflammatory stranding.  Proximal to this there are multiple mildly dilated small  bowel loops which have slightly increased since prior. Nonspecific wall thickening is also noted scattered and several other small bowel loops. nura: No developing abscess, lymphadenopathy or mass. Small volume ascites.     Bones: No acute osseous lesions. IMPRESSION  1. Abnormal small bowel, most prominently involving focal wall thickening and inflammation in a right lower quadrant loop. 2. Dilatation of bowel loops proximal to this, likely partial small bowel obstruction. Contrast does progress to the level of the rectum. 3. Increased ascites, small volume. 4. Right small pleural effusion. Mild bibasilar lung infiltrates versus atelectasis        5/1/21 abd Xray, 2views  Hx:  Abdominal pain and bloating    Increased small bowel distention. Multiple air-fluid levels are now present. Contrast is present in the colon from prior CT scan. There is no free air. There are no renal calculi. The lung bases are clear.     IMPRESSION  Increased small bowel distention, likely high-grade obstruction     5/3/21  Pt denies N/V or abdominal pain. +BM x 2, no flatus. Remains distended. Feels poorly in general.  Afebrile. WBC 11. K+ 3.7. NGT with 1L output. KUB from yesterday demonstrates contrast in the colon and rectum. Past Medical History:   Diagnosis Date    DDD (degenerative disc disease), lumbar 2/19/2020    DM2 (diabetes mellitus, type 2) (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  HX PARTIAL THYROIDECTOMY      [address]  [address]    Brain Sx-[facility] Current Facility-Administered Medications   Medication Dose Route Frequency    potassium chloride 20 mEq in 100 ml IVPB  20 mEq IntraVENous ONCE    labetaloL (NORMODYNE;TRANDATE) injection 10 mg  10 mg IntraVENous Q4H PRN    pantoprazole (PROTONIX) 40 mg in 0.9% sodium chloride 10 mL injection  40 mg IntraVENous DAILY    cloNIDine (CATAPRES) 0.1 mg/24 hr patch 1 Patch  1 Patch TransDERmal Q7D    amLODIPine (NORVASC) tablet 5 mg  5 mg Oral DAILY    insulin lispro (HUMALOG) injection   SubCUTAneous TIDAC    lactated Ringers infusion  75 mL/hr IntraVENous CONTINUOUS    cefTRIAXone (ROCEPHIN) 1 g in 0.9% sodium chloride (MBP/ADV) 50 mL MBP  1 g IntraVENous Q24H    ketorolac (TORADOL) injection 7.5 mg  7.5 mg IntraVENous Q6H PRN    phenol throat spray (CHLORASEPTIC) 1 Spray  1 Spray Oral PRN    [Held by provider] enoxaparin (LOVENOX) injection 30 mg  30 mg SubCUTAneous DAILY    diphenhydrAMINE (BENADRYL) injection 12.5 mg  12.5 mg IntraVENous Q6H PRN    [Held by provider] metFORMIN (GLUCOPHAGE) tablet 1,000 mg  1,000 mg Oral BID WITH MEALS    rosuvastatin (CRESTOR) tablet 20 mg  20 mg Oral QHS    sodium chloride (NS) flush 5-40 mL  5-40 mL IntraVENous Q8H    sodium chloride (NS) flush 5-40 mL  5-40 mL IntraVENous PRN    acetaminophen (TYLENOL) tablet 650 mg  650 mg Oral Q6H PRN    Or    acetaminophen (TYLENOL) suppository 650 mg  650 mg Rectal Q6H PRN    polyethylene glycol (MIRALAX) packet 17 g  17 g Oral DAILY PRN    promethazine (PHENERGAN) tablet 12.5 mg  12.5 mg Oral Q6H PRN    Or    ondansetron (ZOFRAN) injection 4 mg  4 mg IntraVENous Q6H PRN     Lortab [hydrocodone-acetaminophen] and Acetaminophen  Social History     Socioeconomic History    Marital status:      Spouse name: Not on file    Number of children: Not on file    Years of education: Not on file    Highest education level: Not on file   Tobacco Use    Smoking status: Never Smoker    Smokeless tobacco: Never Used Substance and Sexual Activity    Alcohol use: No    Drug use: No    Sexual activity: Yes     Partners: Male     Birth control/protection: Surgical   Other Topics Concern     Social History     Tobacco Use   Smoking Status Never Smoker   Smokeless Tobacco Never Used     Family History   Problem Relation Age of Onset    Heart Disease Father 76    Kidney Disease Father         hepatitis, post-surgery (tranfusion)    Cancer Mother [de-identified]        Breast    Breast Cancer Mother 80    Alzheimer Sister [de-identified]    Arthritis-osteo Sister     Arthritis-osteo Brother         Back surgery    Diabetes Neg Hx      ROS: The patient has no difficulty with chest pain or shortness of breath. No fever or chills. Comprehensive review of systems was otherwise unremarkable except as noted above. Physical Exam:   Visit Vitals  BP (!) 151/74 (BP 1 Location: Right arm, BP Patient Position: At rest)   Pulse 81   Temp 98.6 °F (37 °C)   Resp 20   Ht 5' 4\" (1.626 m)   Wt 134 lb (60.8 kg)   SpO2 96%   BMI 23.00 kg/m²     Vitals:    05/02/21 1436 05/02/21 1900 05/02/21 2307 05/03/21 0238   BP: (!) 155/84 (!) 176/71 (!) 152/73 (!) 151/74   Pulse: 100 86 81 81   Resp: 20 20 20 20   Temp: 98.1 °F (36.7 °C) 98.3 °F (36.8 °C) 98.3 °F (36.8 °C) 98.6 °F (37 °C)   SpO2: 95% 96% 96% 96%   Weight:       Height:         No intake/output data recorded. 05/01 1901 - 05/03 0700  In: 2688 [I.V.:2688]  Out: 1000     Constitutional: Alert, oriented, cooperative patient in no acute distress; appears stated age    Eyes:Sclera are clear. EOMs intact  ENMT: no external lesions gross hearing normal; no obvious neck masses, no ear or lip lesions, nares normal; +NGT with bilious output  CV: RRR. Normal perfusion  Resp: No JVD. Breathing is  non-labored; no audible wheezing. GI: soft with moderate distention, non tender, +BS; no peritoneal signs     Musculoskeletal: unremarkable with normal function. No embolic signs or cyanosis.    Neuro:  Oriented; moves all 4; no focal deficits  Psychiatric: normal affect and mood, no memory impairment    Recent vitals (if inpt):  Patient Vitals for the past 24 hrs:   BP Temp Pulse Resp SpO2   05/03/21 0238 (!) 151/74 98.6 °F (37 °C) 81 20 96 %   05/02/21 2307 (!) 152/73 98.3 °F (36.8 °C) 81 20 96 %   05/02/21 1900 (!) 176/71 98.3 °F (36.8 °C) 86 20 96 %   05/02/21 1436 (!) 155/84 98.1 °F (36.7 °C) 100 20 95 %   05/02/21 1139 (!) 174/73 98.4 °F (36.9 °C) (!) 107 18 94 %   05/02/21 1041 (!) 159/66  (!) 114 18        Amount and/or Complexity of Data Reviewed and Analyzed:  I reviewed and analyzed all of the unique labs and radiologic studies that are shown below as well as any that are in the HPI, and any that are in the expanded problem list below  *Each unique test, order, or document contributes to the combination of 2 or combination of 3 in Category 1 below. For this visit I also reviewed old records and prior notes.       Recent Labs     05/03/21  0510 05/02/21  0828   WBC 11.1 13.0*   HGB 11.9 13.2    238    136   K 3.7 4.0    104   CO2 22 22   BUN 15 12   CREA 0.36* 0.45*   * 135*   TBILI  --  0.5   ALT  --  19   AP  --  46*     Review of most recent CBC  Lab Results   Component Value Date/Time    WBC 11.1 05/03/2021 05:10 AM    HGB 11.9 05/03/2021 05:10 AM    HCT 33.1 (L) 05/03/2021 05:10 AM    PLATELET 792 51/80/9319 05:10 AM    MCV 93.5 05/03/2021 05:10 AM       Review of most recent BMP  Lab Results   Component Value Date/Time    Sodium 137 05/03/2021 05:10 AM    Potassium 3.7 05/03/2021 05:10 AM    Chloride 104 05/03/2021 05:10 AM    CO2 22 05/03/2021 05:10 AM    Anion gap 11 05/03/2021 05:10 AM    Glucose 113 (H) 05/03/2021 05:10 AM    BUN 15 05/03/2021 05:10 AM    Creatinine 0.36 (L) 05/03/2021 05:10 AM    BUN/Creatinine ratio 29 (H) 04/21/2021 10:34 AM    GFR est AA >60 05/03/2021 05:10 AM    GFR est non-AA >60 05/03/2021 05:10 AM    Calcium 8.0 (L) 05/03/2021 05:10 AM       Review of most recent LFTs (and lipase if done)  Lab Results   Component Value Date/Time    ALT (SGPT) 19 05/02/2021 08:28 AM    AST (SGOT) 18 05/02/2021 08:28 AM    Alk. phosphatase 46 (L) 05/02/2021 08:28 AM    Bilirubin, direct 0.11 04/21/2021 10:34 AM    Bilirubin, total 0.5 05/02/2021 08:28 AM     Lab Results   Component Value Date/Time    Lipase 48 (L) 04/27/2021 05:54 PM       Lab Results   Component Value Date/Time    Bilirubin, direct 0.11 04/21/2021 10:34 AM    Troponin-I <0.05 12/30/2010 05:07 AM       Review of most recent HgbA1c  Lab Results   Component Value Date/Time    Hemoglobin A1c 6.4 (H) 04/21/2021 10:34 AM       Nutritional assessment screen for wound healing issues:  Lab Results   Component Value Date/Time    Protein, total 5.4 (L) 05/02/2021 08:28 AM    Albumin 2.9 (L) 05/02/2021 08:28 AM       @lastcovr@  XR Results (most recent):  Results from [place] encounter on 04/27/21   XR ABD (KUB)    Narrative Abdominal film for feeding tube placement, 5/2/2021    History: Feeding tube placement. Comparison: KUB 5/1/2021. Findings: 2 5 user committed for evaluation. Feeding tube position is not  significant changed with the tip and side port below the gastroesophageal  junction grossly good position. Gas dilation of small bowel is once again seen. This is not significant change from the more recent study although does appear  improved from a preceding study. Assessment for free air is limited although no  obvious free air is seen. Oral contrast is seen in the right colon and rectum. Impression 1. Good position of feeding tube. 2. Grossly stable small bowel dilation with oral contrast identified within the  right colon and rectum.                 CT Results (most recent):  Results from Hospital Encounter encounter on 04/27/21   CT ABD PELV W CONT    Narrative CT of the Abdomen and Pelvis with contrast    CLINICAL INDICATION:  Follow-up of abnormal small bowel with subacute  progressive worsening generalized abdominal distention and pain. Spontaneous  bacterial peritonitis. COMPARISON: 4/26/2021    TECHNIQUE: Automated exposure Control was used. Multiple axial images were  obtained through the abdomen and pelvis after intravenous injection of 125cc of  isovue 370 IV contrast to further evaluate vessels and organs. Coronal  reformatted images were done for further evaluation of bones and organs. Oral  contrast was given for further evaluation of GI tract and adjacent organs. FINDINGS: Partially included lung bases demonstrate a new small posterior  layering pleural effusion on the right and mild infiltrates/atelectasis in both  posterior lower lungs. No dense consolidation. Nonspecific mild wall thickening  of distal esophagus and probable tiny hiatal hernia. Abdomen: Unchanged indeterminate right adrenal nodule, most likely incidental  adenoma in the absence of clinical suspicion. The kidneys, adrenals, spleen,  liver, pancreas, gallbladder have not changed in 2 days. Right renal cyst again  noted. Diffuse atherosclerotic changes again noted of aorta. Contrast does  opacify major vessels. No evidence of free air, large volume ascites, or focal  fluid collection in the abdomen. There is increased tracer small volume of  abdominopelvic ascites. GI tract demonstrates contrast from level of stomach to rectum. Stomach,  duodenum, large bowel, distal ileum are not distended. There is a focal right  lower quadrant small bowel loop with wall thickening and surrounding  inflammatory stranding. Proximal to this there are multiple mildly dilated small  bowel loops which have slightly increased since prior. Nonspecific wall  thickening is also noted scattered and several other small bowel loops. Pelvis: No developing abscess, lymphadenopathy or mass. Small volume ascites. Bones: No acute osseous lesions. Impression 1.  Abnormal small bowel, most prominently involving focal wall thickening and  inflammation in a right lower quadrant loop. 2. Dilatation of bowel loops proximal to this, likely partial small bowel  obstruction. Contrast does progress to the level of the rectum. 3. Increased ascites, small volume. 4. Right small pleural effusion. Mild bibasilar lung infiltrates versus  atelectasis. US Results (most recent):  No results found for this or any previous visit. Admission date (for inpatients): 4/27/2021   * No surgery found *  * No surgery found *        ASSESSMENT/PLAN:  Problem List  Date Reviewed: 4/21/2021          Codes Class Noted    Hypomagnesemia ICD-10-CM: E83.42  ICD-9-CM: 275.2  5/1/2021        * (Principal) SBO (small bowel obstruction) ([facility] ICD-10-CM: C02.221  ICD-9-CM: 560.9  4/27/2021        Leukocytosis ICD-10-CM: F08.024  ICD-9-CM: 288.60  4/27/2021        UTI (urinary tract infection) ICD-10-CM: N39.0  ICD-9-CM: 599.0  4/27/2021        Lumbar radiculopathy ICD-10-CM: M54.16  ICD-9-CM: 724.4  2/19/2020        DDD (degenerative disc disease), lumbar ICD-10-CM: M51.36  ICD-9-CM: 722.52  2/19/2020        Gastroesophageal reflux disease without esophagitis ICD-10-CM: K21.9  ICD-9-CM: 530.81  6/7/2017        Type 2 diabetes mellitus without complication, without long-term current use of insulin ([facility] ICD-10-CM: E11.9  ICD-9-CM: 250.00  11/29/2016        Mixed hyperlipidemia ICD-10-CM: E78.2  ICD-9-CM: 272.2  11/29/2016        Osteopenia of multiple sites ICD-10-CM: M85.89  ICD-9-CM: 733.90  11/29/2016    Overview Signed 4/21/2021  8:19 AM by Jay Jay Colmenares MD     Not compliant with multiple BMD orders.               Essential hypertension ICD-10-CM: I10  ICD-9-CM: 401.9  2/23/2016            Principal Problem:    SBO (small bowel obstruction) ([facility] (4/27/2021)    Active Problems:    Essential hypertension (2/23/2016)      Type 2 diabetes mellitus without complication, without long-term current use of insulin ([facility] (11/29/2016)      Mixed hyperlipidemia (11/29/2016)      Gastroesophageal reflux disease without esophagitis (6/7/2017)      Leukocytosis (4/27/2021)      UTI (urinary tract infection) (4/27/2021)      Hypomagnesemia (5/1/2021)           Number and Complexity of Problems addressed and   Risks of complications and/or morbidity of management      Partial SBO  2 BMs but no flatus, remains distended but improved.  No abdominal pain, -N/V.  KUB from 5/2 demonstrates contrast in colon/rectum  WBC 11    Continue inpt admission  Continue NGT decompression  Continue Bowel rest/NPO    Maint IVF  Replace lytes PRN  Serial exam  Watch renal function daily with IV meds and hydration    Likely repeat CT without contrast in 2-4 days        Mignon Castle PA

## 2021-05-03 NOTE — PROGRESS NOTES
Comprehensive Nutrition Assessment    Type and Reason for Visit: Initial, RD nutrition re-screen/LOS      Nutrition Recommendations/Plan:    If it is expected that diet will not be able to be progressed to full liquid diet within 3 days, suggest consider PN. If PN pursued, please order nutrition consult for TPN management. Malnutrition Assessment:  Malnutrition Status: At risk for malnutrition (specify)(Intake < 50% of estimated needs snce admission)  Nutrition Assessment:   Nutrition History:      5/3: Pt reports normal intake 8 days ago of 3 meals.  reports pt also snacks sometimes and does not eat large meals. He has never been been able to get her to accept a protein drink. Pt denies any weight loss PTA. Nutrition Background: H/O: DDD, DM, GERD, HLD, HTN. Presented with 2 day hx of constant and progressive N/V/lower abd pain. CT on 4/28/21 showed thickening of focal small bowel wall with proximal dilation and likely partial SBO.5/1 abd xray  showed increased small bowel distention, likely high-grade obstruction  Daily Update:  Pt did not tolerate attempt at diet progression on 4/30. Output by Drain (mL) 05/02/21 0701 - 05/02/21 1900 05/02/21 1901 - 05/03/21 0700 05/03/21 0701 - 05/03/21 1130 Range Total   Nasogastric Tube 05/01/21 200 12  1000    reports there was mention made by surgery team of IV nutrition. Pt says as long as she doesn't have to eat, that sounds good to her. Pt has had 7 days of inadequate intake. Would recommend use of PN is resolution of SBO is not expected within 3 days. Could use PPN if expected need is < 7 days and PIV access can be maintained. Nutrition Related Findings:   4/27: NPO the clear liquid. 4/28: NPO, then clear liquid. 4/29: Full liquid. 4/30: Low fiber.  5/1: NPO and NGT to LIS as pt did not tolerate attempts at diet progression      Current Nutrition Therapies:  DIET NPO With Ice Chips, Except Meds, With Sips of Clear Fluids    Current Intake: Average Meal Intake: NPO        Anthropometric Measures:  Height: 5' 4\" (162.6 cm)  Current Body Wt: (none), Weight source:    BMI:  , Normal weight (BMI 22.0-24.9) age over 72  Admission Body Weight: 134 lb 0.6 oz(stated)  Ideal Body Weight (lbs) (Calculated): 120 lbs (55 kg),    Usual Body Wt: (132# stated, 136-141# per EMR for past 1 year), Percent weight change:            Edema: No data recorded   Estimated Daily Nutrient Needs:  Energy (kcal/day): 1687-3723 (Kcal/kg(25-30), Weight Used: Admission(Stated 60.8 kg))  Protein (g/day): 76-91 Weight Used: ((20% of kcal))      Nutrition Diagnosis:   · Inadequate oral intake related to altered GI function, inadequate protein-energy intake(SBO) as evidenced by (intake < 50% of estimated needs x 1 week, s/s as above)    Nutrition Interventions:   Food and/or Nutrient Delivery: Continue NPO     Coordination of Nutrition Care: Continue to monitor while inpatient    Goals: Active Goal: Intake > 50% of estimated needs within 3 days    Nutrition Monitoring and Evaluation:      Food/Nutrient Intake Outcomes: Other (specify)(Start of po diet vs PN)  Physical Signs/Symptoms Outcomes: GI status    Discharge Planning:     Too soon to determine    Brenda Azevedo, 68 [address], [facility] [address]

## 2021-05-04 LAB
ANION GAP SERPL CALC-SCNC: 12 MMOL/L (ref 7–16)
BASOPHILS # BLD: 0.1 K/UL (ref 0–0.2)
BASOPHILS NFR BLD: 1 % (ref 0–2)
BUN SERPL-MCNC: 9 MG/DL (ref 8–23)
CALCIUM SERPL-MCNC: 8 MG/DL (ref 8.3–10.4)
CHLORIDE SERPL-SCNC: 104 MMOL/L (ref 98–107)
CO2 SERPL-SCNC: 22 MMOL/L (ref 21–32)
CREAT SERPL-MCNC: 0.3 MG/DL (ref 0.6–1)
DIFFERENTIAL METHOD BLD: ABNORMAL
EOSINOPHIL # BLD: 0.2 K/UL (ref 0–0.8)
EOSINOPHIL NFR BLD: 2 % (ref 0.5–7.8)
ERYTHROCYTE [DISTWIDTH] IN BLOOD BY AUTOMATED COUNT: 12.2 % (ref 11.9–14.6)
GLUCOSE BLD STRIP.AUTO-MCNC: 102 MG/DL (ref 65–100)
GLUCOSE BLD STRIP.AUTO-MCNC: 107 MG/DL (ref 65–100)
GLUCOSE BLD STRIP.AUTO-MCNC: 110 MG/DL (ref 65–100)
GLUCOSE BLD STRIP.AUTO-MCNC: 98 MG/DL (ref 65–100)
GLUCOSE SERPL-MCNC: 106 MG/DL (ref 65–100)
HCT VFR BLD AUTO: 33.9 % (ref 35.8–46.3)
HGB BLD-MCNC: 12.1 G/DL (ref 11.7–15.4)
IMM GRANULOCYTES # BLD AUTO: 0.1 K/UL (ref 0–0.5)
IMM GRANULOCYTES NFR BLD AUTO: 1 % (ref 0–5)
LYMPHOCYTES # BLD: 1.1 K/UL (ref 0.5–4.6)
LYMPHOCYTES NFR BLD: 10 % (ref 13–44)
MCH RBC QN AUTO: 33.3 PG (ref 26.1–32.9)
MCHC RBC AUTO-ENTMCNC: 35.7 G/DL (ref 31.4–35)
MCV RBC AUTO: 93.4 FL (ref 79.6–97.8)
MONOCYTES # BLD: 1.1 K/UL (ref 0.1–1.3)
MONOCYTES NFR BLD: 10 % (ref 4–12)
NEUTS SEG # BLD: 8.5 K/UL (ref 1.7–8.2)
NEUTS SEG NFR BLD: 77 % (ref 43–78)
NRBC # BLD: 0 K/UL (ref 0–0.2)
PLATELET # BLD AUTO: 278 K/UL (ref 150–450)
PMV BLD AUTO: 9.9 FL (ref 9.4–12.3)
POTASSIUM SERPL-SCNC: 3.5 MMOL/L (ref 3.5–5.1)
RBC # BLD AUTO: 3.63 M/UL (ref 4.05–5.2)
SERVICE CMNT-IMP: ABNORMAL
SERVICE CMNT-IMP: NORMAL
SODIUM SERPL-SCNC: 138 MMOL/L (ref 136–145)
WBC # BLD AUTO: 11 K/UL (ref 4.3–11.1)

## 2021-05-04 PROCEDURE — 65270000029 HC RM PRIVATE

## 2021-05-04 PROCEDURE — C9113 INJ PANTOPRAZOLE SODIUM, VIA: HCPCS | Performed by: INTERNAL MEDICINE

## 2021-05-04 PROCEDURE — 74011250637 HC RX REV CODE- 250/637: Performed by: INTERNAL MEDICINE

## 2021-05-04 PROCEDURE — 82962 GLUCOSE BLOOD TEST: CPT

## 2021-05-04 PROCEDURE — 74011250636 HC RX REV CODE- 250/636: Performed by: HOSPITALIST

## 2021-05-04 PROCEDURE — 80048 BASIC METABOLIC PNL TOTAL CA: CPT

## 2021-05-04 PROCEDURE — 97530 THERAPEUTIC ACTIVITIES: CPT

## 2021-05-04 PROCEDURE — 36415 COLL VENOUS BLD VENIPUNCTURE: CPT

## 2021-05-04 PROCEDURE — 85025 COMPLETE CBC W/AUTO DIFF WBC: CPT

## 2021-05-04 PROCEDURE — 2709999900 HC NON-CHARGEABLE SUPPLY

## 2021-05-04 PROCEDURE — 74011000250 HC RX REV CODE- 250: Performed by: INTERNAL MEDICINE

## 2021-05-04 PROCEDURE — 74011250636 HC RX REV CODE- 250/636: Performed by: INTERNAL MEDICINE

## 2021-05-04 RX ORDER — AMLODIPINE BESYLATE 5 MG/1
5 TABLET ORAL ONCE
Status: COMPLETED | OUTPATIENT
Start: 2021-05-04 | End: 2021-05-04

## 2021-05-04 RX ORDER — LABETALOL HYDROCHLORIDE 5 MG/ML
10 INJECTION, SOLUTION INTRAVENOUS
Status: DISCONTINUED | OUTPATIENT
Start: 2021-05-04 | End: 2021-05-05

## 2021-05-04 RX ORDER — POTASSIUM CHLORIDE 14.9 MG/ML
20 INJECTION INTRAVENOUS
Status: COMPLETED | OUTPATIENT
Start: 2021-05-04 | End: 2021-05-04

## 2021-05-04 RX ORDER — AMLODIPINE BESYLATE 10 MG/1
10 TABLET ORAL DAILY
Status: DISCONTINUED | OUTPATIENT
Start: 2021-05-05 | End: 2021-05-06

## 2021-05-04 RX ADMIN — AMLODIPINE BESYLATE 5 MG: 5 TABLET ORAL at 07:48

## 2021-05-04 RX ADMIN — AMLODIPINE BESYLATE 5 MG: 5 TABLET ORAL at 11:59

## 2021-05-04 RX ADMIN — POTASSIUM CHLORIDE 20 MEQ: 14.9 INJECTION, SOLUTION INTRAVENOUS at 08:10

## 2021-05-04 RX ADMIN — SODIUM CHLORIDE, SODIUM LACTATE, POTASSIUM CHLORIDE, AND CALCIUM CHLORIDE 75 ML/HR: 600; 310; 30; 20 INJECTION, SOLUTION INTRAVENOUS at 06:09

## 2021-05-04 RX ADMIN — PANTOPRAZOLE SODIUM 40 MG: 40 INJECTION, POWDER, FOR SOLUTION INTRAVENOUS at 07:47

## 2021-05-04 RX ADMIN — DIPHENHYDRAMINE HYDROCHLORIDE 12.5 MG: 50 INJECTION, SOLUTION INTRAMUSCULAR; INTRAVENOUS at 21:44

## 2021-05-04 RX ADMIN — LABETALOL HYDROCHLORIDE 10 MG: 5 INJECTION INTRAVENOUS at 22:14

## 2021-05-04 RX ADMIN — SODIUM CHLORIDE, SODIUM LACTATE, POTASSIUM CHLORIDE, AND CALCIUM CHLORIDE 75 ML/HR: 600; 310; 30; 20 INJECTION, SOLUTION INTRAVENOUS at 20:00

## 2021-05-04 RX ADMIN — LABETALOL HYDROCHLORIDE 10 MG: 5 INJECTION INTRAVENOUS at 07:48

## 2021-05-04 RX ADMIN — Medication 10 ML: at 14:00

## 2021-05-04 RX ADMIN — POTASSIUM CHLORIDE 20 MEQ: 14.9 INJECTION, SOLUTION INTRAVENOUS at 10:01

## 2021-05-04 RX ADMIN — Medication 10 ML: at 05:19

## 2021-05-04 RX ADMIN — Medication 10 ML: at 20:50

## 2021-05-04 NOTE — PROGRESS NOTES
Attempted to see patient- family member in room, stating she did not sleep well last night, her BP is high and they tried to get her to the bathroom and pulled the tube out of her nose and they had to get that back in place- check back if time allows.   Lana Berry PTA

## 2021-05-04 NOTE — PROGRESS NOTES
Problem: Mobility Impaired (Adult and Pediatric)  Goal: *Acute Goals and Plan of Care (Insert Text)  Description: STG:  (1.)Ms. Anaya Hull will move from supine to sit and sit to supine  with STAND BY ASSIST within 4-7 treatment day(s). Progressing 5/2  (2.)Ms. Anaya Hull will transfer from bed to chair and chair to bed with STAND BY ASSIST using the least restrictive device within 4-7 treatment day(s). (3.)Ms. Anaya Hull will ambulate with STAND BY ASSIST for 400 feet with the least restrictive device within 4-7 treatment day(s). ________________________________________________________________________________________________      Outcome: Progressing Towards Goal     PHYSICAL THERAPY: Daily Note and PM 5/4/2021  INPATIENT: PT Visit Days : 5  [place] / [place] B / Product Type: Medicare /       NAME/AGE/GENDER: Jose Alfredo Cobb is a 68 y.o. female   PRIMARY DIAGNOSIS: Acute gastroenteritis [K52.9] SBO (small bowel obstruction) ([facility]) [facility]       ICD-10: Treatment Diagnosis:    · Generalized Muscle Weakness (M62.81)  · Difficulty in walking, Not elsewhere classified (R26.2)   Precaution/Allergies:  Lortab [hydrocodone-acetaminophen] and Acetaminophen      ASSESSMENT:     Ms. Anaya Hull agreeable to walking with therapy. NG tube clamped. Complaint of feeling weak-hasn't really eaten in days. Ambulated in hallway with IV pole and min A. Expect her to progress as she is able to eat and get some energy. Continue to recommend [facility] at d/c. This section established at most recent assessment   PROBLEM LIST (Impairments causing functional limitations):  1. Decreased Strength  2. Decreased ADL/Functional Activities  3. Decreased Transfer Abilities  4. Decreased Ambulation Ability/Technique  5. Decreased Balance  6. Decreased Activity Tolerance   INTERVENTIONS PLANNED: (Benefits and precautions of physical therapy have been discussed with the patient.)  1.  Balance Exercise  2. Bed Mobility  3. Gait Training  4. Therapeutic Activites  5. Therapeutic Exercise/Strengthening  6. Transfer Training     TREATMENT PLAN: Frequency/Duration: daily for duration of hospital stay  Rehabilitation Potential For Stated Goals: Good     REHAB RECOMMENDATIONS (at time of discharge pending progress):    Placement: It is my opinion, based on this patient's performance to date, that Ms. Joel Bolanos may benefit from [address] after discharge due to the functional deficits listed above that are likely to improve with skilled rehabilitation because he/she has multiple medical issues that affect his/her functional mobility in the community. Equipment:    To be determined, may need a rolling walker              HISTORY:   History of Present Injury/Illness (Reason for Referral):  PER MD NOTE: Cj Sena is a 68 y.o. female, with a history of  has a past medical history of DDD (degenerative disc disease), lumbar (2/19/2020), DM2 (diabetes mellitus, type 2) ([address] (3/4/2014), Gastroesophageal reflux disease without esophagitis (5/22/2015), HLD (hyperlipidemia), HTN (hypertension), Menopause, Sciatica of right side (2/19/2020), and UTI (urinary tract infection) (4/27/2021). She also has no past medical history of Asthma, Autoimmune disease ([address] CAD (coronary artery disease), Cancer ([address] Chronic kidney disease, COPD, Dementia, Heart failure ([address] Liver disease, Other ill-defined conditions(799.89), Psychiatric disorder, PUD (peptic ulcer disease), Seizures ([address] Sleep disorder, Stroke ([address] or Thromboembolus ([address] ,  has a past surgical history that includes hx hysterectomy; hx partial thyroidectomy; hx other surgical; hx colonoscopy (01/25/2010); and neurological procedure unlisted (2014). who presents to the ER with report of two days of persistent lower abdominal pain, nausea, and vomiting.  She was seen in the ER yesterday and was discharged home with diagnosis of UTI after labs and CT abd/pelvis were unremarkable for admitting diagnosis. Pt reports persistent nausea and vomiting since that time and was unable to keep down Keflex that was prescribed for the UTI. Denies any fevers, chills, diarrhea. Past Medical History/Comorbidities:   Ms. Salina Farias  has a past medical history of DDD (degenerative disc disease), lumbar (2/19/2020), DM2 (diabetes mellitus, type 2) ([address] (3/4/2014), Gastroesophageal reflux disease without esophagitis (5/22/2015), HLD (hyperlipidemia), HTN (hypertension), Menopause, Sciatica of right side (2/19/2020), and UTI (urinary tract infection) (4/27/2021). She also has no past medical history of Asthma, Autoimmune disease ([address] CAD (coronary artery disease), Cancer ([address] Chronic kidney disease, COPD, Dementia, Heart failure ([address] Liver disease, Other ill-defined conditions(799.89), Psychiatric disorder, PUD (peptic ulcer disease), Seizures ([address] Sleep disorder, Stroke ([address] or Thromboembolus ([address] Ms. Salina Farias  has a past surgical history that includes hx hysterectomy; hx partial thyroidectomy; hx other surgical; hx colonoscopy (01/25/2010); and neurological procedure unlisted (2014).   Social History/Living Environment:   Home Environment: Private residence  One/Two Story Residence: Two story  Living Alone: No  Support Systems: Spouse/Significant Other/Partner  Patient Expects to be Discharged to[de-identified] Private residence  Current DME Used/Available at Home: None  Prior Level of Function/Work/Activity:  Pt lives at home with spouse, independent with mobility without assistive devices     Number of Personal Factors/Comorbidities that affect the Plan of Care: 0: LOW COMPLEXITY   EXAMINATION:   Most Recent Physical Functioning:   Gross Assessment:  AROM: Within functional limits  Strength: Generally decreased, functional                    Balance:  Sitting: Intact  Standing: With support Bed Mobility:  Supine to Sit: Stand-by assistance  Scooting: Stand-by assistance Transfers:  Sit to Stand: Stand-by assistance  Stand to Sit: Stand-by assistance  Bed to Chair: Contact guard assistance;Minimum assistance  Gait:     Speed/Sunshine: Pace decreased (<100 feet/min)  Step Length: Left shortened;Right shortened  Gait Abnormalities: Decreased step clearance  Distance (ft): 100 Feet (ft)  Assistive Device: (IV pole and HHA)  Ambulation - Level of Assistance: Minimal assistance  Interventions: Safety awareness training;Verbal cues      Body Structures Involved:  1. Muscles Body Functions Affected:  1. Movement Related Activities and Participation Affected:  1. Mobility  2. Self Care   Number of elements that affect the Plan of Care: 4+: HIGH COMPLEXITY   CLINICAL PRESENTATION:   Presentation: Stable and uncomplicated: LOW COMPLEXITY   CLINICAL DECISION MAKING:   [facility] AM-PAC 6 Clicks   Basic Mobility Inpatient Short Form  How much difficulty does the patient currently have. .. Unable A Lot A Little None   1. Turning over in bed (including adjusting bedclothes, sheets and blankets)? [] 1   [] 2   [x] 3   [] 4   2. Sitting down on and standing up from a chair with arms ( e.g., wheelchair, bedside commode, etc.)   [] 1   [] 2   [x] 3   [] 4   3. Moving from lying on back to sitting on the side of the bed? [] 1   [] 2   [x] 3   [] 4   How much help from another person does the patient currently need. .. Total A Lot A Little None   4. Moving to and from a bed to a chair (including a wheelchair)? [] 1   [] 2   [x] 3   [] 4   5. Need to walk in hospital room? [] 1   [] 2   [x] 3   [] 4   6. Climbing 3-5 steps with a railing? [] 1   [] 2   [x] 3   [] 4   © 2007, Trustees of [employer], under license to [employer]. All rights reserved      Score:  Initial: 18 Most Recent: X (Date: -- )    Interpretation of Tool:  Represents activities that are increasingly more difficult (i.e. Bed mobility, Transfers, Gait).     Medical Necessity:     · Patient is expected to demonstrate progress in   · strength and functional technique  ·  to   · decrease assistance required with functional mobility  · . Reason for Services/Other Comments:  · Patient continues to require skilled intervention due to   · Inability to complete functional mobility independently  · . Use of outcome tool(s) and clinical judgement create a POC that gives a: Clear prediction of patient's progress: LOW COMPLEXITY            TREATMENT:   (In addition to Assessment/Re-Assessment sessions the following treatments were rendered)   Pre-treatment Symptoms/Complaints: Patient agreeable to walking. Pain: Initial: numeric scale  Pain Intensity 1: 0  Post Session:  3/10   Therapeutic Activity: (    15 minutes): Therapeutic activities including Bed transfers, Chair transfers and Ambulation on level ground to improve mobility, strength, balance and coordination. Required minimal Safety awareness training;Verbal cues to promote static and dynamic balance in standing. Braces/Orthotics/Lines/Etc:   · IV  · nasogastric tube  Treatment/Session Assessment:    · Response to Treatment:  Participated well but energy level limited. · Interdisciplinary Collaboration:   o Physical Therapist  o Registered Nurse  · After treatment position/precautions:   o Up in chair  o Bed/Chair-wheels locked  o Bed in low position  o Caregiver at bedside  o Call light within reach   · Compliance with Program/Exercises: Will assess as treatment progresses  · Recommendations/Intent for next treatment session: \"Next visit will focus on advancements to more challenging activities and reduction in assistance provided\".   Total Treatment Duration:  PT Patient Time In/Time Out  Time In: 1450  Time Out: 1401 [facility] Jose Mcdonald PT

## 2021-05-04 NOTE — PROGRESS NOTES
[facility] Hospitalist Service Progress Note    Briefly, 61-year-old female with significant past medical history of diabetes mellitus and GERD presented to the hospital with abdominal pain, nausea and vomiting. She was initially diagnosed with UTI and she was treated with ceftriaxone. In addition, her initial CT was concerning for small bowel obstruction which was reviewed by surgeon. Patient was treated for ileus and she had 2 bowel movement. Patient improved but on the night of April 30, patient started having some belly pain and abdominal distention. X-ray on May 1 was consistent with SBO. General surgery consulted. Patient started on NG tube with low intermittent suction on May 1. Patient still has NG tube. General surgery following and as per nurse they recommended medications intermittently. Continue otherwise n.p.o. status. May 4: As per patient she passed gas one time yesterday and had 1 bowel movement. Intermittent abdominal pain. Overnight while sleeping, NG tube came out. Denies any nausea or vomiting. NG tube in place but currently on hold as per surgeon recommendation per nurse. Rest review of system negative except mentioned above. Assessment / Plan:    Distended loops of bowel, possible small bowel obstruction  Initially treated for ileus. Now currently most likely SBO. General surgery consulted. NPO. NG tube with low intermittent suction. IV fluid hydration. Replete potassium for level greater than 4. TSH within normal limit. Conservative management. May 2: Consistent with SBO. On conservative management with NG tube at low intermittent suction. May 3: Continue conservative management. General surgery on board. E. coli UTI: Finished ceftriaxone on May 3. Diabetes mellitus type 2  Sliding scale insulin. Significantly elevated blood pressure: Hydralazine ordered. After hydralazine patient felt that her heart rate was racing and her heart rate was 84.   We will decrease the dose of hydralazine. Amlodipine started. May 2: Stop hydralazine. Given patient is n.p.o. and on NG tube suction so p.o. medication would not be helpful. Discussed side effects and benefit and will start patient on low-dose clonidine patch. As needed labetalol. We will continue to monitor. May 3: Much better controlled on clonidine patch. Will monitor. May 4: As an outpatient can take medication in between. Increase amlodipine to 10 mg. Continue clonidine patch and as needed labetalol. SCDs for DVT prophylaxis given unsure if patient will need surgery. Timing of pharmacological DVT prophylaxis as per surgeon. Patient is AOx3. Patient  was present at bedside. Again, extensively discussed the plan with patient and patient's . Both verbalized understanding that her condition may deteriorate in spite of treatment. Objective:  Visit Vitals  BP (!) 179/64   Pulse 86   Temp 98 °F (36.7 °C)   Resp 18   Ht 5' 4\" (1.626 m)   Wt 60.8 kg (134 lb)   SpO2 95%   BMI 23.00 kg/m²                 Physical Exam:  General: Mildly ill-appearing  HEENT: Extraocular muscle seems intact. Mucous membranes slightly dry. 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   Neck: Supple , no JVD   Lungs: Clear to auscultation bilaterally   Cardiovascular: Regular rate and rhythm with normal S1 and S2   Abdomen: Soft, distended with some tenderness, bowel sounds are hyperactive some  extremities: No cyanosis clubbing or edema   Neuro: Moving all 4 extremities, speech normal l, A&O x3   Psych: Normal affect   Procedures done this admission:  * No surgery found *    All Micro Results     Procedure Component Value Units Date/Time    CULTURE, BLOOD [[id number]] Collected: 04/27/21 2036    Order Status: Completed Specimen: Blood Updated: 05/02/21 0742     Special Requests: --        RIGHT  Antecubital       Culture result: NO GROWTH 5 DAYS       CULTURE, BLOOD [[id number]] Collected: 04/27/21 1854    Order Status: Completed Specimen: Blood Updated: 05/02/21 0742     Special Requests: --        NO SPECIAL REQUESTS  LEFT  HAND       Culture result: NO GROWTH 5 DAYS             SARS-CoV-2 Lab Results  \"Novel Coronavirus\" Test: No results found for: COV2NT   \"Emergent Disease\" Test: No results found for: EDPR  \"SARS-COV-2\" Test: No results found for: XGCOVT  \"Precision Labs\" Test: No results found for: RSLT  Rapid Test: No results found for: COVR         Labs: Results:       BMP, Mg, Phos Recent Labs     05/04/21  0548 05/03/21  0510 05/02/21  0828    137 136   K 3.5 3.7 4.0    104 104   CO2 22 22 22   AGAP 12 11 10   BUN 9 15 12   CREA 0.30* 0.36* 0.45*   CA 8.0* 8.0* 8.1*   * 113* 135*   MG  --  2.0 2.1   PHOS  --  3.1  --       CBC Recent Labs     05/04/21 0548 05/03/21  0510 05/02/21  0828   WBC 11.0 11.1 13.0*   RBC 3.63* 3.54* 3.99*   HGB 12.1 11.9 13.2   HCT 33.9* 33.1* 38.0    256 238   GRANS 77 75 83*   LYMPH 10* 12* 7*   EOS 2 2 1   MONOS 10 10 9   BASOS 1 0 0   IG 1 1 0   ANEU 8.5* 8.3* 10.8*   ABL 1.1 1.4 0.9   TAMMY 0.2 0.2 0.1   ABM 1.1 1.2 1.2   ABB 0.1 0.0 0.0   AIG 0.1 0.1 0.0      LFT Recent Labs     05/02/21  0828   ALT 19   AP 46*   TP 5.4*   ALB 2.9*   GLOB 2.5   AGRAT 1.2      Cardiac Testing Lab Results   Component Value Date/Time    Troponin-I <0.05 12/30/2010 05:07 AM    Troponin-I <0.05 12/30/2010 02:12 AM      Coagulation Tests No results found for: PTP, INR, APTT, INREXT, INREXT   A1c Lab Results   Component Value Date/Time    Hemoglobin A1c 6.4 (H) 04/21/2021 10:34 AM    Hemoglobin A1c 6.7 (H) 10/02/2020 10:11 AM    Hemoglobin A1c 7.7 (H) 06/30/2020 12:00 PM      Lipid Panel Lab Results   Component Value Date/Time    Cholesterol, total 116 04/21/2021 10:34 AM    HDL Cholesterol 63 04/21/2021 10:34 AM    LDL, calculated 39 04/21/2021 10:34 AM    LDL, calculated 34 06/30/2020 12:00 PM    VLDL, calculated 14 04/21/2021 10:34 AM    VLDL, calculated 23 06/30/2020 12:00 PM    Triglyceride 69 04/21/2021 10:34 AM    CHOL/HDL Ratio 4.1 05/26/2016 08:58 AM      Thyroid Panel Lab Results   Component Value Date/Time    TSH 2.700 04/21/2021 10:34 AM    TSH 2.900 09/03/2019 08:46 AM        Most Recent UA Lab Results   Component Value Date/Time    Color Yellow 09/03/2019 08:46 AM    Appearance Clear 09/03/2019 08:46 AM    pH (UA) 5.0 06/07/2017 08:52 AM    Protein Negative 06/07/2017 08:52 AM    Glucose Negative 06/07/2017 08:52 AM    Ketone Negative 06/07/2017 08:52 AM    Bilirubin Negative 06/07/2017 08:52 AM    Blood Negative 06/07/2017 08:52 AM    Urobilinogen 0.2 E.U./dL 06/07/2017 08:52 AM    Nitrites Positive (A) 06/07/2017 08:52 AM    Leukocyte Esterase Trace (A) 06/07/2017 08:52 AM    WBC 5-10 04/26/2021 12:43 PM    RBC 0-3 04/26/2021 12:43 PM    Epithelial cells 0-3 04/26/2021 12:43 PM    Bacteria 4+ (H) 04/26/2021 12:43 PM    Casts 3-5 04/26/2021 12:43 PM          Elva Swenson MD  5/4/2021

## 2021-05-04 NOTE — PROGRESS NOTES
Noted PT notes recommend home health at d/c. Patient NG tube clamped at this time. Interdisciplinary rounds with Dr. Kim Vale, nursing, CM and PT. CM will f/u with patient for recommendations of [facility] and which agency they would like to go to. CM following. Addendum: Patient had chosen [facility] and orders are written and referral already sent to [address] [place] [address].

## 2021-05-04 NOTE — PROGRESS NOTES
H&P/Consult Note/Progress Note/Office Note:   Иван Miguel  MRN: [id number]  RDE:3/31/8331  Age:80 y.o.    HPI: Иван Miguel is a 68 y.o. female who was admitted on 4/27/20 by the Hospitalists   after she presented to the ER a 2nd time in 2 days for with constant and progressive N/V/lower abd pain for 2 days. Nothing made symptoms better or worse. No associated fever  She is s/p hysterectomy approx 2011    CT on 4/28/21 showed thickening of focal small bowel wall with proximal dilation and likely partial SBO  Dr Staci Salazar was consulted for SBO on 4/29/21. Her symptoms worsened today and she had the below abd Xray with AF levels and signs of persistent SBO       4/28/21 CT abd/pelvis with oral and IV contrast  Hx:  Follow-up of abnormal small bowel with subacute progressive worsening generalized abdominal distention and pain. Spontaneous bacterial peritonitis.      FINDINGS: Partially included lung bases demonstrate a new small posterior  layering pleural effusion on the right and mild infiltrates/atelectasis in both  posterior lower lungs. No dense consolidation. Nonspecific mild wall thickening  of distal esophagus and probable tiny hiatal hernia.     Abdomen: Unchanged indeterminate right adrenal nodule, most likely incidental  adenoma in the absence of clinical suspicion. The kidneys, adrenals, spleen,  liver, pancreas, gallbladder have not changed in 2 days. Right renal cyst again  noted. Diffuse atherosclerotic changes again noted of aorta. Contrast does  opacify major vessels. No evidence of free air, large volume ascites, or focal  fluid collection in the abdomen. There is increased tracer small volume of  abdominopelvic ascites.     GI tract demonstrates contrast from level of stomach to rectum. Stomach,  duodenum, large bowel, distal ileum are not distended. There is a focal right  lower quadrant small bowel loop with wall thickening and surrounding  inflammatory stranding.  Proximal to this there are multiple mildly dilated small  bowel loops which have slightly increased since prior. Nonspecific wall thickening is also noted scattered and several other small bowel loops. nura: No developing abscess, lymphadenopathy or mass. Small volume ascites.     Bones: No acute osseous lesions. IMPRESSION  1. Abnormal small bowel, most prominently involving focal wall thickening and inflammation in a right lower quadrant loop. 2. Dilatation of bowel loops proximal to this, likely partial small bowel obstruction. Contrast does progress to the level of the rectum. 3. Increased ascites, small volume. 4. Right small pleural effusion. Mild bibasilar lung infiltrates versus atelectasis        5/1/21 abd Xray, 2views  Hx:  Abdominal pain and bloating    Increased small bowel distention. Multiple air-fluid levels are now present. Contrast is present in the colon from prior CT scan. There is no free air. There are no renal calculi. The lung bases are clear.     IMPRESSION  Increased small bowel distention, likely high-grade obstruction     5/3/21  Pt denies N/V or abdominal pain. +BM x 2, no flatus. Remains distended. Feels poorly in general.  Afebrile. WBC 11. K+ 3.7. NGT with 1L output. KUB from yesterday demonstrates contrast in the colon and rectum. 5/4/21 Pt denies N/V or abdominal pain. +BM, + flatus. Less distended. Still feels poorly in general.  Afebrile. WBC 11. K+ 3.5. NGT with 200mL output- came out and was replace overnight.       Past Medical History:   Diagnosis Date    DDD (degenerative disc disease), lumbar 2/19/2020    DM2 (diabetes mellitus, type 2) (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  HX PARTIAL THYROIDECTOMY      Lobectomy for benign mass.     NEUROLOGICAL PROCEDURE UNLISTED  2014    [facility]     Current Facility-Administered Medications   Medication Dose Route Frequency    potassium chloride 20 mEq in 100 ml IVPB  20 mEq IntraVENous Q2H    labetaloL (NORMODYNE;TRANDATE) injection 10 mg  10 mg IntraVENous Q4H PRN    pantoprazole (PROTONIX) 40 mg in 0.9% sodium chloride 10 mL injection  40 mg IntraVENous DAILY    cloNIDine (CATAPRES) 0.1 mg/24 hr patch 1 Patch  1 Patch TransDERmal Q7D    amLODIPine (NORVASC) tablet 5 mg  5 mg Oral DAILY    insulin lispro (HUMALOG) injection   SubCUTAneous TIDAC    lactated Ringers infusion  75 mL/hr IntraVENous CONTINUOUS    ketorolac (TORADOL) injection 7.5 mg  7.5 mg IntraVENous Q6H PRN    phenol throat spray (CHLORASEPTIC) 1 Spray  1 Spray Oral PRN    [Held by provider] enoxaparin (LOVENOX) injection 30 mg  30 mg SubCUTAneous DAILY    diphenhydrAMINE (BENADRYL) injection 12.5 mg  12.5 mg IntraVENous Q6H PRN    [Held by provider] metFORMIN (GLUCOPHAGE) tablet 1,000 mg  1,000 mg Oral BID WITH MEALS    rosuvastatin (CRESTOR) tablet 20 mg  20 mg Oral QHS    sodium chloride (NS) flush 5-40 mL  5-40 mL IntraVENous Q8H    sodium chloride (NS) flush 5-40 mL  5-40 mL IntraVENous PRN    acetaminophen (TYLENOL) tablet 650 mg  650 mg Oral Q6H PRN    Or    acetaminophen (TYLENOL) suppository 650 mg  650 mg Rectal Q6H PRN    polyethylene glycol (MIRALAX) packet 17 g  17 g Oral DAILY PRN    promethazine (PHENERGAN) tablet 12.5 mg  12.5 mg Oral Q6H PRN    Or    ondansetron (ZOFRAN) injection 4 mg  4 mg IntraVENous Q6H PRN     Lortab [hydrocodone-acetaminophen] and Acetaminophen  Social History     Socioeconomic History    Marital status:      Spouse name: Not on file    Number of children: Not on file    Years of education: Not on file    Highest education level: Not on file   Tobacco Use    Smoking status: Never Smoker    Smokeless tobacco: Never Used   Substance and Sexual Activity    Alcohol use: No    Drug use: No    Sexual activity: Yes     Partners: Male     Birth control/protection: Surgical   Other Topics Concern     Social History     Tobacco Use   Smoking Status Never Smoker   Smokeless Tobacco Never Used     Family History   Problem Relation Age of Onset    Heart Disease Father 76    Kidney Disease Father         hepatitis, post-surgery (tranfusion)    Cancer Mother [de-identified]        Breast    Breast Cancer Mother 80    Alzheimer Sister [de-identified]    Arthritis-osteo Sister     Arthritis-osteo Brother         Back surgery    Diabetes Neg Hx      ROS: The patient has no difficulty with chest pain or shortness of breath. No fever or chills. Comprehensive review of systems was otherwise unremarkable except as noted above. Physical Exam:   Visit Vitals  BP (!) 179/64   Pulse 86   Temp 98 °F (36.7 °C)   Resp 18   Ht 5' 4\" (1.626 m)   Wt 134 lb (60.8 kg)   SpO2 95%   BMI 23.00 kg/m²     Vitals:    05/03/21 2310 05/04/21 0322 05/04/21 0744 05/04/21 0845   BP: 137/67 134/69 (!) 192/75 (!) 179/64   Pulse: 78 66 86    Resp: 18 18     Temp: 98.4 °F (36.9 °C) 98.3 °F (36.8 °C) 98 °F (36.7 °C)    SpO2: 96% 96% 95%    Weight:       Height:         No intake/output data recorded. 05/02 1901 - 05/04 0700  In: 3903 [I.V.:3903]  Out: 2000 [Urine:1200]    Constitutional: Alert, oriented, cooperative patient in no acute distress; appears stated age    Eyes:Sclera are clear. EOMs intact  ENMT: no external lesions gross hearing normal; no obvious neck masses, no ear or lip lesions, nares normal; +NGT with bilious output  CV: RRR. Normal perfusion  Resp: No JVD. Breathing is  non-labored; no audible wheezing. GI: soft with moderate distention, non tender, +BS; no peritoneal signs     Musculoskeletal: unremarkable with normal function. No embolic signs or cyanosis.    Neuro: Oriented; moves all 4; no focal deficits  Psychiatric: normal affect and mood, no memory impairment    Recent vitals (if inpt):  Patient Vitals for the past 24 hrs:   BP Temp Pulse Resp SpO2   05/04/21 0845 (!) 179/64       05/04/21 0744 (!) 192/75 98 °F (36.7 °C) 86  95 %   05/04/21 0322 134/69 98.3 °F (36.8 °C) 66 18 96 %   05/03/21 2310 137/67 98.4 °F (36.9 °C) 78 18 96 %   05/03/21 1927 (!) 167/69 98.4 °F (36.9 °C) 83 18 96 %   05/03/21 1513 (!) 170/69 98.4 °F (36.9 °C) 79 18 96 %   05/03/21 1140 (!) 152/62 98.6 °F (37 °C) 80 19 95 %       Amount and/or Complexity of Data Reviewed and Analyzed:  I reviewed and analyzed all of the unique labs and radiologic studies that are shown below as well as any that are in the HPI, and any that are in the expanded problem list below  *Each unique test, order, or document contributes to the combination of 2 or combination of 3 in Category 1 below. For this visit I also reviewed old records and prior notes. Recent Labs     05/04/21  0548 05/02/21  0828 05/02/21 0828   WBC 11.0   < > 13.0*   HGB 12.1   < > 13.2      < > 238      < > 136   K 3.5   < > 4.0      < > 104   CO2 22   < > 22   BUN 9   < > 12   CREA 0.30*   < > 0.45*   *   < > 135*   TBILI  --   --  0.5   ALT  --   --  19   AP  --   --  46*    < > = values in this interval not displayed.      Review of most recent CBC  Lab Results   Component Value Date/Time    WBC 11.0 05/04/2021 05:48 AM    HGB 12.1 05/04/2021 05:48 AM    HCT 33.9 (L) 05/04/2021 05:48 AM    PLATELET 818 62/30/1695 05:48 AM    MCV 93.4 05/04/2021 05:48 AM       Review of most recent BMP  Lab Results   Component Value Date/Time    Sodium 138 05/04/2021 05:48 AM    Potassium 3.5 05/04/2021 05:48 AM    Chloride 104 05/04/2021 05:48 AM    CO2 22 05/04/2021 05:48 AM    Anion gap 12 05/04/2021 05:48 AM    Glucose 106 (H) 05/04/2021 05:48 AM    BUN 9 05/04/2021 05:48 AM    Creatinine 0.30 (L) 05/04/2021 05:48 AM BUN/Creatinine ratio 29 (H) 04/21/2021 10:34 AM    GFR est AA >60 05/04/2021 05:48 AM    GFR est non-AA >60 05/04/2021 05:48 AM    Calcium 8.0 (L) 05/04/2021 05:48 AM       Review of most recent LFTs (and lipase if done)  Lab Results   Component Value Date/Time    ALT (SGPT) 19 05/02/2021 08:28 AM    AST (SGOT) 18 05/02/2021 08:28 AM    Alk. phosphatase 46 (L) 05/02/2021 08:28 AM    Bilirubin, direct 0.11 04/21/2021 10:34 AM    Bilirubin, total 0.5 05/02/2021 08:28 AM     Lab Results   Component Value Date/Time    Lipase 48 (L) 04/27/2021 05:54 PM       Lab Results   Component Value Date/Time    Bilirubin, direct 0.11 04/21/2021 10:34 AM    Troponin-I <0.05 12/30/2010 05:07 AM       Review of most recent HgbA1c  Lab Results   Component Value Date/Time    Hemoglobin A1c 6.4 (H) 04/21/2021 10:34 AM       Nutritional assessment screen for wound healing issues:  Lab Results   Component Value Date/Time    Protein, total 5.4 (L) 05/02/2021 08:28 AM    Albumin 2.9 (L) 05/02/2021 08:28 AM       @lastcovr@  XR Results (most recent):  Results from [place] encounter on 04/27/21   XR ABD (KUB)    Narrative Exam: Single view abdomen. Indication: Enteric tube placement. Comparison: Abdomen film dated May 02, 2021. FINDINGS:  Enteric tube tip in the gastric body. Redemonstrated diffusely air-filled  distended loops of bowel of the upper and midabdomen with the lower abdomen not  imaged      Impression 1. Enteric tube tip in the gastric body. 2. Diffuse air-filled loops of bowel throughout the imaged abdomen suspicious  for ileus or obstruction. CT Results (most recent):  Results from Hospital Encounter encounter on 04/27/21   CT ABD PELV W CONT    Narrative CT of the Abdomen and Pelvis with contrast    CLINICAL INDICATION:  Follow-up of abnormal small bowel with subacute  progressive worsening generalized abdominal distention and pain. Spontaneous  bacterial peritonitis.      COMPARISON: 4/26/2021    TECHNIQUE: Automated exposure Control was used. Multiple axial images were  obtained through the abdomen and pelvis after intravenous injection of 125cc of  isovue 370 IV contrast to further evaluate vessels and organs. Coronal  reformatted images were done for further evaluation of bones and organs. Oral  contrast was given for further evaluation of GI tract and adjacent organs. FINDINGS: Partially included lung bases demonstrate a new small posterior  layering pleural effusion on the right and mild infiltrates/atelectasis in both  posterior lower lungs. No dense consolidation. Nonspecific mild wall thickening  of distal esophagus and probable tiny hiatal hernia. Abdomen: Unchanged indeterminate right adrenal nodule, most likely incidental  adenoma in the absence of clinical suspicion. The kidneys, adrenals, spleen,  liver, pancreas, gallbladder have not changed in 2 days. Right renal cyst again  noted. Diffuse atherosclerotic changes again noted of aorta. Contrast does  opacify major vessels. No evidence of free air, large volume ascites, or focal  fluid collection in the abdomen. There is increased tracer small volume of  abdominopelvic ascites. GI tract demonstrates contrast from level of stomach to rectum. Stomach,  duodenum, large bowel, distal ileum are not distended. There is a focal right  lower quadrant small bowel loop with wall thickening and surrounding  inflammatory stranding. Proximal to this there are multiple mildly dilated small  bowel loops which have slightly increased since prior. Nonspecific wall  thickening is also noted scattered and several other small bowel loops. Pelvis: No developing abscess, lymphadenopathy or mass. Small volume ascites. Bones: No acute osseous lesions. Impression 1. Abnormal small bowel, most prominently involving focal wall thickening and  inflammation in a right lower quadrant loop.   2. Dilatation of bowel loops proximal to this, likely partial small bowel  obstruction. Contrast does progress to the level of the rectum. 3. Increased ascites, small volume. 4. Right small pleural effusion. Mild bibasilar lung infiltrates versus  atelectasis. US Results (most recent):  No results found for this or any previous visit. Admission date (for inpatients): 4/27/2021   * No surgery found *  * No surgery found *        ASSESSMENT/PLAN:  Problem List  Date Reviewed: 4/21/2021          Codes Class Noted    Hypomagnesemia ICD-10-CM: E83.42  ICD-9-CM: 275.2  5/1/2021        * (Principal) SBO (small bowel obstruction) ([facility] ICD-10-CM: R07.529  ICD-9-CM: 560.9  4/27/2021        Leukocytosis ICD-10-CM: G86.190  ICD-9-CM: 288.60  4/27/2021        UTI (urinary tract infection) ICD-10-CM: N39.0  ICD-9-CM: 599.0  4/27/2021        Lumbar radiculopathy ICD-10-CM: M54.16  ICD-9-CM: 724.4  2/19/2020        DDD (degenerative disc disease), lumbar ICD-10-CM: M51.36  ICD-9-CM: 722.52  2/19/2020        Gastroesophageal reflux disease without esophagitis ICD-10-CM: K21.9  ICD-9-CM: 530.81  6/7/2017        Type 2 diabetes mellitus without complication, without long-term current use of insulin ([facility] ICD-10-CM: E11.9  ICD-9-CM: 250.00  11/29/2016        Mixed hyperlipidemia ICD-10-CM: E78.2  ICD-9-CM: 272.2  11/29/2016        Osteopenia of multiple sites ICD-10-CM: M85.89  ICD-9-CM: 733.90  11/29/2016    Overview Signed 4/21/2021  8:19 AM by Lincoln Chavez MD     Not compliant with multiple BMD orders.               Essential hypertension ICD-10-CM: I10  ICD-9-CM: 401.9  2/23/2016            Principal Problem:    SBO (small bowel obstruction) ([facility] (4/27/2021)    Active Problems:    Essential hypertension (2/23/2016)      Type 2 diabetes mellitus without complication, without long-term current use of insulin ([facility] [address] (11/29/2016)      Mixed hyperlipidemia (11/29/2016)      Gastroesophageal reflux disease without esophagitis (6/7/2017)      Leukocytosis (4/27/2021) UTI (urinary tract infection) (4/27/2021)      Hypomagnesemia (5/1/2021)           Number and Complexity of Problems addressed and   Risks of complications and/or morbidity of management      Partial SBO  Large BM, +flatus, remains distended but improving. No abdominal pain, -N/V.  KUB from 5/2 demonstrates contrast in colon/rectum; KUB for tube placement demonstrates distention of loops of bowel  WBC 11    Continue inpt admission  Continue NGT decompression; tube came out and was replace overnight. Continue Bowel rest/NPO    Maint IVF  Replace lytes PRN  Serial exam  Watch renal function daily with IV meds and hydration    Likely repeat CT without contrast in 2-4 days        KARYN Herman     Clamp ngt and allow sips of clears today     I have personally performed a face-to-face diagnostic evaluation and management  service on this patient. I have independently seen the patient. I have independently obtained the above history from the patient/family. I have independently examined the patient with above findings. I have independently reviewed data/labs for this patient and developed the above plan of care (MDM).     Hakeem Donato MD

## 2021-05-04 NOTE — PROGRESS NOTES
END OF SHIFT    No BM   + flatus  Active bowel sounds   NGT clamped tolerating sips of clears and some jello     Bedside report given to [employer]

## 2021-05-05 PROBLEM — E87.6 HYPOKALEMIA: Status: ACTIVE | Noted: 2021-05-05

## 2021-05-05 LAB
ANION GAP SERPL CALC-SCNC: 10 MMOL/L (ref 7–16)
BASOPHILS # BLD: 0.1 K/UL (ref 0–0.2)
BASOPHILS NFR BLD: 1 % (ref 0–2)
BUN SERPL-MCNC: 6 MG/DL (ref 8–23)
CALCIUM SERPL-MCNC: 8.5 MG/DL (ref 8.3–10.4)
CHLORIDE SERPL-SCNC: 104 MMOL/L (ref 98–107)
CO2 SERPL-SCNC: 24 MMOL/L (ref 21–32)
CREAT SERPL-MCNC: 0.23 MG/DL (ref 0.6–1)
DIFFERENTIAL METHOD BLD: ABNORMAL
EOSINOPHIL # BLD: 0.3 K/UL (ref 0–0.8)
EOSINOPHIL NFR BLD: 3 % (ref 0.5–7.8)
ERYTHROCYTE [DISTWIDTH] IN BLOOD BY AUTOMATED COUNT: 12.2 % (ref 11.9–14.6)
GLUCOSE BLD STRIP.AUTO-MCNC: 104 MG/DL (ref 65–100)
GLUCOSE BLD STRIP.AUTO-MCNC: 125 MG/DL (ref 65–100)
GLUCOSE BLD STRIP.AUTO-MCNC: 138 MG/DL (ref 65–100)
GLUCOSE BLD STRIP.AUTO-MCNC: 143 MG/DL (ref 65–100)
GLUCOSE SERPL-MCNC: 107 MG/DL (ref 65–100)
HCT VFR BLD AUTO: 34 % (ref 35.8–46.3)
HGB BLD-MCNC: 12.1 G/DL (ref 11.7–15.4)
IMM GRANULOCYTES # BLD AUTO: 0.1 K/UL (ref 0–0.5)
IMM GRANULOCYTES NFR BLD AUTO: 1 % (ref 0–5)
LYMPHOCYTES # BLD: 1.3 K/UL (ref 0.5–4.6)
LYMPHOCYTES NFR BLD: 14 % (ref 13–44)
MAGNESIUM SERPL-MCNC: 1.6 MG/DL (ref 1.8–2.4)
MCH RBC QN AUTO: 33.2 PG (ref 26.1–32.9)
MCHC RBC AUTO-ENTMCNC: 35.6 G/DL (ref 31.4–35)
MCV RBC AUTO: 93.2 FL (ref 79.6–97.8)
MONOCYTES # BLD: 0.9 K/UL (ref 0.1–1.3)
MONOCYTES NFR BLD: 10 % (ref 4–12)
NEUTS SEG # BLD: 6.5 K/UL (ref 1.7–8.2)
NEUTS SEG NFR BLD: 71 % (ref 43–78)
NRBC # BLD: 0 K/UL (ref 0–0.2)
PLATELET # BLD AUTO: 285 K/UL (ref 150–450)
PMV BLD AUTO: 9.1 FL (ref 9.4–12.3)
POTASSIUM SERPL-SCNC: 3.3 MMOL/L (ref 3.5–5.1)
RBC # BLD AUTO: 3.65 M/UL (ref 4.05–5.2)
SERVICE CMNT-IMP: ABNORMAL
SODIUM SERPL-SCNC: 138 MMOL/L (ref 136–145)
WBC # BLD AUTO: 9 K/UL (ref 4.3–11.1)

## 2021-05-05 PROCEDURE — 74011250637 HC RX REV CODE- 250/637: Performed by: INTERNAL MEDICINE

## 2021-05-05 PROCEDURE — 65270000029 HC RM PRIVATE

## 2021-05-05 PROCEDURE — C9113 INJ PANTOPRAZOLE SODIUM, VIA: HCPCS | Performed by: INTERNAL MEDICINE

## 2021-05-05 PROCEDURE — 2709999900 HC NON-CHARGEABLE SUPPLY

## 2021-05-05 PROCEDURE — 83735 ASSAY OF MAGNESIUM: CPT

## 2021-05-05 PROCEDURE — 82962 GLUCOSE BLOOD TEST: CPT

## 2021-05-05 PROCEDURE — 36415 COLL VENOUS BLD VENIPUNCTURE: CPT

## 2021-05-05 PROCEDURE — 74011000250 HC RX REV CODE- 250: Performed by: INTERNAL MEDICINE

## 2021-05-05 PROCEDURE — 85025 COMPLETE CBC W/AUTO DIFF WBC: CPT

## 2021-05-05 PROCEDURE — 74011250636 HC RX REV CODE- 250/636: Performed by: INTERNAL MEDICINE

## 2021-05-05 PROCEDURE — 74011250636 HC RX REV CODE- 250/636: Performed by: FAMILY MEDICINE

## 2021-05-05 PROCEDURE — 97530 THERAPEUTIC ACTIVITIES: CPT

## 2021-05-05 PROCEDURE — 80048 BASIC METABOLIC PNL TOTAL CA: CPT

## 2021-05-05 RX ORDER — POTASSIUM CHLORIDE 14.9 MG/ML
20 INJECTION INTRAVENOUS
Status: COMPLETED | OUTPATIENT
Start: 2021-05-05 | End: 2021-05-05

## 2021-05-05 RX ORDER — POTASSIUM CHLORIDE 14.9 MG/ML
20 INJECTION INTRAVENOUS
Status: DISCONTINUED | OUTPATIENT
Start: 2021-05-05 | End: 2021-05-05

## 2021-05-05 RX ORDER — MAGNESIUM SULFATE HEPTAHYDRATE 40 MG/ML
2 INJECTION, SOLUTION INTRAVENOUS ONCE
Status: COMPLETED | OUTPATIENT
Start: 2021-05-05 | End: 2021-05-05

## 2021-05-05 RX ORDER — HYDRALAZINE HYDROCHLORIDE 20 MG/ML
10 INJECTION INTRAMUSCULAR; INTRAVENOUS
Status: DISCONTINUED | OUTPATIENT
Start: 2021-05-05 | End: 2021-05-07

## 2021-05-05 RX ADMIN — SODIUM CHLORIDE, SODIUM LACTATE, POTASSIUM CHLORIDE, AND CALCIUM CHLORIDE 75 ML/HR: 600; 310; 30; 20 INJECTION, SOLUTION INTRAVENOUS at 09:02

## 2021-05-05 RX ADMIN — Medication 10 ML: at 13:56

## 2021-05-05 RX ADMIN — POTASSIUM CHLORIDE 20 MEQ: 14.9 INJECTION, SOLUTION INTRAVENOUS at 11:10

## 2021-05-05 RX ADMIN — LABETALOL HYDROCHLORIDE 10 MG: 5 INJECTION INTRAVENOUS at 09:45

## 2021-05-05 RX ADMIN — LABETALOL HYDROCHLORIDE 10 MG: 5 INJECTION INTRAVENOUS at 05:52

## 2021-05-05 RX ADMIN — AMLODIPINE BESYLATE 10 MG: 10 TABLET ORAL at 08:53

## 2021-05-05 RX ADMIN — MAGNESIUM SULFATE HEPTAHYDRATE 2 G: 40 INJECTION, SOLUTION INTRAVENOUS at 13:56

## 2021-05-05 RX ADMIN — PANTOPRAZOLE SODIUM 40 MG: 40 INJECTION, POWDER, FOR SOLUTION INTRAVENOUS at 08:53

## 2021-05-05 RX ADMIN — Medication 10 ML: at 22:00

## 2021-05-05 RX ADMIN — SODIUM CHLORIDE, SODIUM LACTATE, POTASSIUM CHLORIDE, AND CALCIUM CHLORIDE 75 ML/HR: 600; 310; 30; 20 INJECTION, SOLUTION INTRAVENOUS at 16:51

## 2021-05-05 RX ADMIN — POTASSIUM CHLORIDE 20 MEQ: 14.9 INJECTION, SOLUTION INTRAVENOUS at 08:54

## 2021-05-05 RX ADMIN — Medication 10 ML: at 05:19

## 2021-05-05 NOTE — PROGRESS NOTES
Patient Vitals for the past 12 hrs:   Temp Pulse Resp BP SpO2   05/05/21 1529 98.1 °F (36.7 °C) 75 18 (!) 159/64 92 %   05/05/21 1152 98.2 °F (36.8 °C) 73 18 (!) 165/72 92 %   05/05/21 1020  82  (!) 169/62    05/05/21 0935  87  (!) 184/64    05/05/21 0755 98.2 °F (36.8 °C) 79 18 (!) 179/68 93 %     NG tube removed per surgery. PRN labetolol given x's 1 for elevated bp. MD Rocael Nelson notified of patient's increased bp's. Ordered PRN hydralazine. Patient had bm smear this am but has not passed flatus since or had BM.     Bedside shift report given to Danyell Valdez, on-coming RN

## 2021-05-05 NOTE — PROGRESS NOTES
[facility]ist Progress Note     Name:  Tata Jung  Age:80 y.o. Sex:female   :  1940    MRN:  [id number]     Admit Date:  2021    Reason for Admission:  Acute gastroenteritis [K52.9]    Hospital Course/Interval history:     Briefly, 24-year-old female with significant past medical history of diabetes mellitus and GERD presented to the hospital with abdominal pain, nausea and vomiting. She was initially diagnosed with UTI and she was treated with ceftriaxone. In addition, her initial CT was concerning for small bowel obstruction which was reviewed by surgeon. Patient was treated for ileus and she had 2 bowel movement. Patient improved but on the night of , patient started having some belly pain and abdominal distention. X-ray on May 1 was consistent with SBO. General surgery consulted. Patient started on NG tube with low intermittent suction on May 1. Patient still has NG tube. General surgery following and as per nurse they recommended medications intermittently. Continue otherwise n.p.o. status.        May 4: As per patient she passed gas one time yesterday and had 1 bowel movement. Intermittent abdominal pain. Overnight while sleeping, NG tube came out. Denies any nausea or vomiting. NG tube in place but currently on hold as per surgeon recommendation per nurse. Rest review of system negative except mentioned above. Subjective (21):    2021  NG tube were DC'd this a.m. .  As per surgery okay to start clears. Review of Systems: 14 point review of systems is otherwise negative with the exception of the elements mentioned above.   Assessment & Plan     -Small bowel obstruction-off NG tube 2021-started on clears-surgery following  -Uncontrolled hypertension-continue home medication and as needed meds  -Hypokalemia and hypomagnesemia-replace  -E. coli UTI-finished course of Rocephin  -Diabetes mellitus type 2-continue sliding scale      Diet: DIET CLEAR LIQUID  DVT PPx:lovenox  Code status: Full Code  Disposition/Expected LOS            Objective:     Patient Vitals for the past 24 hrs:   Temp Pulse Resp BP SpO2   05/05/21 1925 98 °F (36.7 °C) 83 18 (!) 179/71 92 %   05/05/21 1529 98.1 °F (36.7 °C) 75 18 (!) 159/64 92 %   05/05/21 1152 98.2 °F (36.8 °C) 73 18 (!) 165/72 92 %   05/05/21 1020  82  (!) 169/62    05/05/21 0935  87  (!) 184/64    05/05/21 0755 98.2 °F (36.8 °C) 79 18 (!) 179/68 93 %   05/05/21 0246 98 °F (36.7 °C) 87 17 (!) 188/70 92 %   05/04/21 2257 98.3 °F (36.8 °C) 78 18 (!) 155/72 97 %     Oxygen Therapy  O2 Sat (%): 92 % (05/05/21 1925)  Pulse via Oximetry: 74 beats per minute (04/27/21 2026)  O2 Device: None (Room air) (05/05/21 0853)    Body mass index is 23 kg/m².     Physical Exam:   General:  No acute distress, speaking in full sentences, no use of accessory muscles   HEENT-pupils equal normal reactive to light neck supple throat normal  History of brain surgery, chronic left facial droop  Lungs:  Clear to auscultation bilaterally   CV:  Regular rate and rhythm with normal S1 and S2   Abdomen:  Soft, nontender, nondistended, normoactive bowel sounds   Extremities:  No cyanosis clubbing or edema   Neuro:  Nonfocal, A&O x3   Psych:  Normal affect     Data Review:  I have reviewed all labs, meds, and studies from the last 24 hours:    Labs:    Recent Results (from the past 24 hour(s))   GLUCOSE, POC    Collection Time: 05/04/21  8:59 PM   Result Value Ref Range    Glucose (POC) 107 (H) 65 - 100 mg/dL    Performed by Shweta    CBC WITH AUTOMATED DIFF    Collection Time: 05/05/21  6:11 AM   Result Value Ref Range    WBC 9.0 4.3 - 11.1 K/uL    RBC 3.65 (L) 4.05 - 5.2 M/uL    HGB 12.1 11.7 - 15.4 g/dL    HCT 34.0 (L) 35.8 - 46.3 %    MCV 93.2 79.6 - 97.8 FL    MCH 33.2 (H) 26.1 - 32.9 PG    MCHC 35.6 (H) 31.4 - 35.0 g/dL    RDW 12.2 11.9 - 14.6 %    PLATELET 478 210 - 249 K/uL    MPV 9.1 (L) 9.4 - 12.3 FL    ABSOLUTE NRBC 0.00 0.0 - 0.2 K/uL    DF AUTOMATED      NEUTROPHILS 71 43 - 78 %    LYMPHOCYTES 14 13 - 44 %    MONOCYTES 10 4.0 - 12.0 %    EOSINOPHILS 3 0.5 - 7.8 %    BASOPHILS 1 0.0 - 2.0 %    IMMATURE GRANULOCYTES 1 0.0 - 5.0 %    ABS. NEUTROPHILS 6.5 1.7 - 8.2 K/UL    ABS. LYMPHOCYTES 1.3 0.5 - 4.6 K/UL    ABS. MONOCYTES 0.9 0.1 - 1.3 K/UL    ABS. EOSINOPHILS 0.3 0.0 - 0.8 K/UL    ABS. BASOPHILS 0.1 0.0 - 0.2 K/UL    ABS. IMM.  GRANS. 0.1 0.0 - 0.5 K/UL   METABOLIC PANEL, BASIC    Collection Time: 05/05/21  6:11 AM   Result Value Ref Range    Sodium 138 136 - 145 mmol/L    Potassium 3.3 (L) 3.5 - 5.1 mmol/L    Chloride 104 98 - 107 mmol/L    CO2 24 21 - 32 mmol/L    Anion gap 10 7 - 16 mmol/L    Glucose 107 (H) 65 - 100 mg/dL    BUN 6 (L) 8 - 23 MG/DL    Creatinine 0.23 (L) 0.6 - 1.0 MG/DL    GFR est AA >60 >60 ml/min/1.73m2    GFR est non-AA >60 >60 ml/min/1.73m2    Calcium 8.5 8.3 - 10.4 MG/DL   MAGNESIUM    Collection Time: 05/05/21  6:11 AM   Result Value Ref Range    Magnesium 1.6 (L) 1.8 - 2.4 mg/dL   GLUCOSE, POC    Collection Time: 05/05/21  8:03 AM   Result Value Ref Range    Glucose (POC) 104 (H) 65 - 100 mg/dL    Performed by IsabelleWeTOWNSMahnazifer    GLUCOSE, POC    Collection Time: 05/05/21 12:03 PM   Result Value Ref Range    Glucose (POC) 125 (H) 65 - 100 mg/dL    Performed by Victor HugocockMahnazifer    GLUCOSE, POC    Collection Time: 05/05/21  4:31 PM   Result Value Ref Range    Glucose (POC) 143 (H) 65 - 100 mg/dL    Performed by Batsheva Posadas        All Micro Results     Procedure Component Value Units Date/Time    CULTURE, BLOOD [[id number]] Collected: 04/27/21 2036    Order Status: Completed Specimen: Blood Updated: 05/02/21 0742     Special Requests: --        RIGHT  Antecubital       Culture result: NO GROWTH 5 DAYS       CULTURE, BLOOD [[id number]] Collected: 04/27/21 1854    Order Status: Completed Specimen: Blood Updated: 05/02/21 0742     Special Requests: --        NO SPECIAL REQUESTS  LEFT  HAND Culture result: NO GROWTH 5 DAYS             EKG Results     None          Other Studies:  No results found.     Current Meds:   Current Facility-Administered Medications   Medication Dose Route Frequency    hydrALAZINE (APRESOLINE) 20 mg/mL injection 10 mg  10 mg IntraVENous Q6H PRN    amLODIPine (NORVASC) tablet 10 mg  10 mg Oral DAILY    pantoprazole (PROTONIX) 40 mg in 0.9% sodium chloride 10 mL injection  40 mg IntraVENous DAILY    cloNIDine (CATAPRES) 0.1 mg/24 hr patch 1 Patch  1 Patch TransDERmal Q7D    insulin lispro (HUMALOG) injection   SubCUTAneous TIDAC    lactated Ringers infusion  75 mL/hr IntraVENous CONTINUOUS    phenol throat spray (CHLORASEPTIC) 1 Spray  1 Spray Oral PRN    [Held by provider] enoxaparin (LOVENOX) injection 30 mg  30 mg SubCUTAneous DAILY    diphenhydrAMINE (BENADRYL) injection 12.5 mg  12.5 mg IntraVENous Q6H PRN    [Held by provider] metFORMIN (GLUCOPHAGE) tablet 1,000 mg  1,000 mg Oral BID WITH MEALS    rosuvastatin (CRESTOR) tablet 20 mg  20 mg Oral QHS    sodium chloride (NS) flush 5-40 mL  5-40 mL IntraVENous Q8H    sodium chloride (NS) flush 5-40 mL  5-40 mL IntraVENous PRN    acetaminophen (TYLENOL) tablet 650 mg  650 mg Oral Q6H PRN    Or    acetaminophen (TYLENOL) suppository 650 mg  650 mg Rectal Q6H PRN    polyethylene glycol (MIRALAX) packet 17 g  17 g Oral DAILY PRN    promethazine (PHENERGAN) tablet 12.5 mg  12.5 mg Oral Q6H PRN    Or    ondansetron (ZOFRAN) injection 4 mg  4 mg IntraVENous Q6H PRN       Problem List:  Hospital Problems as of 5/5/2021 Date Reviewed: 4/21/2021          Codes Class Noted - Resolved POA    Hypokalemia ICD-10-CM: E87.6  ICD-9-CM: 276.8  5/5/2021 - Present Unknown        Hypomagnesemia ICD-10-CM: E83.42  ICD-9-CM: 275.2  5/1/2021 - Present No        * (Principal) SBO (small bowel obstruction) ([facility] ICD-10-CM: S83.675  ICD-9-CM: 560.9  4/27/2021 - Present Yes        Leukocytosis ICD-10-CM: T05.836  ICD-9-CM: 288.60 4/27/2021 - Present Yes        UTI (urinary tract infection) ICD-10-CM: N39.0  ICD-9-CM: 599.0  4/27/2021 - Present Yes        Gastroesophageal reflux disease without esophagitis ICD-10-CM: K21.9  ICD-9-CM: 530.81  6/7/2017 - Present Yes        Type 2 diabetes mellitus without complication, without long-term current use of insulin ([facility] ICD-10-CM: E11.9  ICD-9-CM: 250.00  11/29/2016 - Present Yes        Mixed hyperlipidemia ICD-10-CM: E78.2  ICD-9-CM: 272.2  11/29/2016 - Present Yes        Essential hypertension ICD-10-CM: I10  ICD-9-CM: 401.9  2/23/2016 - Present Yes                   Signed By: uYlia Mercer MD   [employer] [facility]    May 5, 2021

## 2021-05-05 NOTE — PROGRESS NOTES
Problem: Mobility Impaired (Adult and Pediatric)  Goal: *Acute Goals and Plan of Care (Insert Text)  Description: STG:  (1.)Ms. Ton Kelly will move from supine to sit and sit to supine  with STAND BY ASSIST within 4-7 treatment day(s). Progressing 5/2  (2.)Ms. Ton Kelly will transfer from bed to chair and chair to bed with STAND BY ASSIST using the least restrictive device within 4-7 treatment day(s). (3.)Ms. Ton Kelly will ambulate with STAND BY ASSIST for 400 feet with the least restrictive device within 4-7 treatment day(s). ________________________________________________________________________________________________      Outcome: Progressing Towards Goal     PHYSICAL THERAPY: Daily Note and PM 5/5/2021  INPATIENT: PT Visit Days : 6  [place] / [place] / Product Type: Medicare /       NAME/AGE/GENDER: Renato Ren is a 68 y.o. female   PRIMARY DIAGNOSIS: Acute gastroenteritis [K52.9] SBO (small bowel obstruction) ([facility]) [facility] obstruction[facility]       ICD-10: Treatment Diagnosis:    · Generalized Muscle Weakness (M62.81)  · Difficulty in walking, Not elsewhere classified (R26.2)   Precaution/Allergies:  Lortab [hydrocodone-acetaminophen] and Acetaminophen      ASSESSMENT:     Ms. Ton Kelly agreeable to walking with therapy. NG tube clamped. Complaint of feeling weak-hasn't really eaten in days. Ambulated in hallway with IV pole and min A. Expect her to progress as she is able to eat and get some energy. Continue to recommend [facility] at d/c.  5/5 supine upon arrival.  All bed mobility SBA. Supine>EOB with SBA-CGA. Stood for few min then walk 130 ft down the kaba pt pushing IV and HHA with therapist.  Return to supine with SBA and performs B LE exercises. Left in supine with needs in reach. This section established at most recent assessment   PROBLEM LIST (Impairments causing functional limitations):  1. Decreased Strength  2.  Decreased ADL/Functional Activities  3. Decreased Transfer Abilities  4. Decreased Ambulation Ability/Technique  5. Decreased Balance  6. Decreased Activity Tolerance   INTERVENTIONS PLANNED: (Benefits and precautions of physical therapy have been discussed with the patient.)  1. Balance Exercise  2. Bed Mobility  3. Gait Training  4. Therapeutic Activites  5. Therapeutic Exercise/Strengthening  6. Transfer Training     TREATMENT PLAN: Frequency/Duration: daily for duration of hospital stay  Rehabilitation Potential For Stated Goals: Good     REHAB RECOMMENDATIONS (at time of discharge pending progress):    Placement: It is my opinion, based on this patient's performance to date, that Ms. Karen Walter may benefit from [address] after discharge due to the functional deficits listed above that are likely to improve with skilled rehabilitation because he/she has multiple medical issues that affect his/her functional mobility in the community. Equipment:    To be determined, may need a rolling walker              HISTORY:   History of Present Injury/Illness (Reason for Referral):  PER MD NOTE: Daniel Vital is a 68 y.o. female, with a history of  has a past medical history of DDD (degenerative disc disease), lumbar (2/19/2020), DM2 (diabetes mellitus, type 2) ([address] (3/4/2014), Gastroesophageal reflux disease without esophagitis (5/22/2015), HLD (hyperlipidemia), HTN (hypertension), Menopause, Sciatica of right side (2/19/2020), and UTI (urinary tract infection) (4/27/2021). She also has no past medical history of Asthma, Autoimmune disease ([address] CAD (coronary artery disease), Cancer ([address] Chronic kidney disease, COPD, Dementia, Heart failure ([address] Liver disease, Other ill-defined conditions(799.89), Psychiatric disorder, PUD (peptic ulcer disease), Seizures ([address] Sleep disorder, Stroke ([address] or Thromboembolus ([address] ,  has a past surgical history that includes hx hysterectomy; hx partial thyroidectomy; hx other surgical; hx colonoscopy (01/25/2010); and neurological procedure unlisted (2014). who presents to the ER with report of two days of persistent lower abdominal pain, nausea, and vomiting. She was seen in the ER yesterday and was discharged home with diagnosis of UTI after labs and CT abd/pelvis were unremarkable for admitting diagnosis. Pt reports persistent nausea and vomiting since that time and was unable to keep down Keflex that was prescribed for the UTI. Denies any fevers, chills, diarrhea. Past Medical History/Comorbidities:   Ms. Etelvina Coppola  has a past medical history of DDD (degenerative disc disease), lumbar (2/19/2020), DM2 (diabetes mellitus, type 2) ([address] (3/4/2014), Gastroesophageal reflux disease without esophagitis (5/22/2015), HLD (hyperlipidemia), HTN (hypertension), Menopause, Sciatica of right side (2/19/2020), and UTI (urinary tract infection) (4/27/2021). She also has no past medical history of Asthma, Autoimmune disease ([address] CAD (coronary artery disease), Cancer ([address] Chronic kidney disease, COPD, Dementia, Heart failure ([address] Liver disease, Other ill-defined conditions(799.89), Psychiatric disorder, PUD (peptic ulcer disease), Seizures ([address] Sleep disorder, Stroke ([address] or Thromboembolus ([address] Ms. Etelvina Coppola  has a past surgical history that includes hx hysterectomy; hx partial thyroidectomy; hx other surgical; hx colonoscopy (01/25/2010); and neurological procedure unlisted (2014).   Social History/Living Environment:   Home Environment: Private residence  One/Two Story Residence: Two story  Living Alone: No  Support Systems: Spouse/Significant Other/Partner  Patient Expects to be Discharged to[de-identified] Private residence  Current DME Used/Available at Home: None  Prior Level of Function/Work/Activity:  Pt lives at home with spouse, independent with mobility without assistive devices     Number of Personal Factors/Comorbidities that affect the Plan of Care: 0: LOW COMPLEXITY   EXAMINATION:   Most Recent Physical Functioning:   Gross Assessment:                       Balance:  Sitting: Intact  Standing: With support Bed Mobility:  Supine to Sit: Stand-by assistance  Sit to Supine: Stand-by assistance  Scooting: Stand-by assistance       Transfers:  Sit to Stand: Stand-by assistance  Stand to Sit: Stand-by assistance  Bed to Chair: Contact guard assistance;Minimum assistance  Duration: 30 Minutes  Gait:     Speed/Sunshine: Pace decreased (<100 feet/min)  Step Length: Left shortened;Right shortened  Gait Abnormalities: Decreased step clearance  Distance (ft): 130 Feet (ft)  Assistive Device: (pushing IV pole and HHA)  Ambulation - Level of Assistance: Minimal assistance  Interventions: Safety awareness training      Body Structures Involved:  1. Muscles Body Functions Affected:  1. Movement Related Activities and Participation Affected:  1. Mobility  2. Self Care   Number of elements that affect the Plan of Care: 4+: HIGH COMPLEXITY   CLINICAL PRESENTATION:   Presentation: Stable and uncomplicated: LOW COMPLEXITY   CLINICAL DECISION MAKIN[address] [place] [address] AM-PAC 6 Clicks   Basic Mobility Inpatient Short Form  How much difficulty does the patient currently have. .. Unable A Lot A Little None   1. Turning over in bed (including adjusting bedclothes, sheets and blankets)? [] 1   [] 2   [x] 3   [] 4   2. Sitting down on and standing up from a chair with arms ( e.g., wheelchair, bedside commode, etc.)   [] 1   [] 2   [x] 3   [] 4   3. Moving from lying on back to sitting on the side of the bed? [] 1   [] 2   [x] 3   [] 4   How much help from another person does the patient currently need. .. Total A Lot A Little None   4. Moving to and from a bed to a chair (including a wheelchair)? [] 1   [] 2   [x] 3   [] 4   5. Need to walk in hospital room? [] 1   [] 2   [x] 3   [] 4   6. Climbing 3-5 steps with a railing? [] 1   [] 2   [x] 3   [] 4   © , Trustees of [address] [place] [id number], under license to [employer].  All rights reserved      Score:  Initial: 18 Most Recent: X (Date: -- )    Interpretation of Tool:  Represents activities that are increasingly more difficult (i.e. Bed mobility, Transfers, Gait). Medical Necessity:     · Patient is expected to demonstrate progress in   · strength and functional technique  ·  to   · decrease assistance required with functional mobility  · . Reason for Services/Other Comments:  · Patient continues to require skilled intervention due to   · Inability to complete functional mobility independently  · . Use of outcome tool(s) and clinical judgement create a POC that gives a: Clear prediction of patient's progress: LOW COMPLEXITY            TREATMENT:   (In addition to Assessment/Re-Assessment sessions the following treatments were rendered)   Pre-treatment Symptoms/Complaints: Patient agreeable  Pain: Initial: numeric scale  Pain Intensity 1: 1  Post Session:     Therapeutic Activity: (  30 Minutes ):  Therapeutic activities including Bed transfers, Chair transfers and Ambulation on level ground to improve mobility, strength, balance and coordination. Required minimal Safety awareness training to promote static and dynamic balance in standing. Date:  5/5 Date:   Date:     Activity/Exercise Parameters Parameters Parameters   Ankle pumps 12     heelsides 12     Hip abd/add 12                                 Braces/Orthotics/Lines/Etc:   · IV  · nasogastric tube  Treatment/Session Assessment:    · Response to Treatment:  Participated well, increase distance, just tired past therapy  · Interdisciplinary Collaboration:   o Physical Therapy Assistant  o Registered Nurse  · After treatment position/precautions:   o Supine in bed  o Bed/Chair-wheels locked  o Bed in low position  o Caregiver at bedside  o Call light within reach   · Compliance with Program/Exercises: Will assess as treatment progresses  · Recommendations/Intent for next treatment session:   \"Next visit will focus on advancements to more challenging activities and reduction in assistance provided\".   Total Treatment Duration:  PT Patient Time In/Time Out  Time In: 1445  Time Out: [address] LAUREN Magana

## 2021-05-05 NOTE — PROGRESS NOTES
Problem: Falls - Risk of  Goal: *Absence of Falls  Description: Document Maninder Tate Fall Risk and appropriate interventions in the flowsheet.   Outcome: Progressing Towards Goal  Note: Fall Risk Interventions:  Mobility Interventions: Bed/chair exit alarm, Communicate number of staff needed for ambulation/transfer, Patient to call before getting OOB         Medication Interventions: Bed/chair exit alarm, Patient to call before getting OOB, Teach patient to arise slowly    Elimination Interventions: Call light in reach, Toileting schedule/hourly rounds, Bed/chair exit alarm

## 2021-05-05 NOTE — PROGRESS NOTES
Saw pt in interdisciplinarily rounds with the following disciplines: Physician, Case Management, Nursing, Dietary,Therapy and Pharmacy. The plan of care was discussed along with goals for discharge date/ location. Per MD pt may d/c later today depending on BP and K+ levels. CM following, [facility] aware of possible later d/c .

## 2021-05-05 NOTE — PROGRESS NOTES
H&P/Consult Note/Progress Note/Office Note:   Flores Forrest  MRN: [id number]  CFF:[birth year]  Age:80 y.o.    HPI: Flores Forrest is a 68 y.o. female who was admitted on 4/27/20 by the Hospitalists   after she presented to the ER a 2nd time in 2 days for with constant and progressive N/V/lower abd pain for 2 days. Nothing made symptoms better or worse. No associated fever  She is s/p hysterectomy approx 2011    CT on 4/28/21 showed thickening of focal small bowel wall with proximal dilation and likely partial SBO  Dr Katerine Darnell was consulted for SBO on 4/29/21. Her symptoms worsened today and she had the below abd Xray with AF levels and signs of persistent SBO       4/28/21 CT abd/pelvis with oral and IV contrast  Hx:  Follow-up of abnormal small bowel with subacute progressive worsening generalized abdominal distention and pain. Spontaneous bacterial peritonitis.      FINDINGS: Partially included lung bases demonstrate a new small posterior  layering pleural effusion on the right and mild infiltrates/atelectasis in both  posterior lower lungs. No dense consolidation. Nonspecific mild wall thickening  of distal esophagus and probable tiny hiatal hernia.     Abdomen: Unchanged indeterminate right adrenal nodule, most likely incidental  adenoma in the absence of clinical suspicion. The kidneys, adrenals, spleen,  liver, pancreas, gallbladder have not changed in 2 days. Right renal cyst again  noted. Diffuse atherosclerotic changes again noted of aorta. Contrast does  opacify major vessels. No evidence of free air, large volume ascites, or focal  fluid collection in the abdomen. There is increased tracer small volume of  abdominopelvic ascites.     GI tract demonstrates contrast from level of stomach to rectum. Stomach,  duodenum, large bowel, distal ileum are not distended. There is a focal right  lower quadrant small bowel loop with wall thickening and surrounding  inflammatory stranding.  Proximal to this there are multiple mildly dilated small  bowel loops which have slightly increased since prior. Nonspecific wall thickening is also noted scattered and several other small bowel loops. nura: No developing abscess, lymphadenopathy or mass. Small volume ascites.     Bones: No acute osseous lesions. IMPRESSION  1. Abnormal small bowel, most prominently involving focal wall thickening and inflammation in a right lower quadrant loop. 2. Dilatation of bowel loops proximal to this, likely partial small bowel obstruction. Contrast does progress to the level of the rectum. 3. Increased ascites, small volume. 4. Right small pleural effusion. Mild bibasilar lung infiltrates versus atelectasis        5/1/21 abd Xray, 2views  Hx:  Abdominal pain and bloating    Increased small bowel distention. Multiple air-fluid levels are now present. Contrast is present in the colon from prior CT scan. There is no free air. There are no renal calculi. The lung bases are clear.     IMPRESSION  Increased small bowel distention, likely high-grade obstruction     5/3/21  Pt denies N/V or abdominal pain. +BM x 2, no flatus. Remains distended. Feels poorly in general.  Afebrile. WBC 11. K+ 3.7. NGT with 1L output. KUB from yesterday demonstrates contrast in the colon and rectum. 5/4/21 Pt denies N/V or abdominal pain. +BM, + flatus. Less distended. Still feels poorly in general.  Afebrile. WBC 11. K+ 3.5. NGT with 200mL output- came out and was replace overnight. 5/5/21  Pt denies abdominal pain, N/V. +flatus. WBC WNL. Afebrile. Tolerating NGT clamping overnight.     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  HX PARTIAL THYROIDECTOMY      Lobectomy for benign mass.     NEUROLOGICAL PROCEDURE UNLISTED  2014    [place] [place]     Current Facility-Administered Medications   Medication Dose Route Frequency    potassium chloride 20 mEq in 100 ml IVPB  20 mEq IntraVENous Q2H    amLODIPine (NORVASC) tablet 10 mg  10 mg Oral DAILY    labetaloL (NORMODYNE;TRANDATE) injection 10 mg  10 mg IntraVENous Q2H PRN    pantoprazole (PROTONIX) 40 mg in 0.9% sodium chloride 10 mL injection  40 mg IntraVENous DAILY    cloNIDine (CATAPRES) 0.1 mg/24 hr patch 1 Patch  1 Patch TransDERmal Q7D    insulin lispro (HUMALOG) injection   SubCUTAneous TIDAC    lactated Ringers infusion  75 mL/hr IntraVENous CONTINUOUS    ketorolac (TORADOL) injection 7.5 mg  7.5 mg IntraVENous Q6H PRN    phenol throat spray (CHLORASEPTIC) 1 Spray  1 Spray Oral PRN    [Held by provider] enoxaparin (LOVENOX) injection 30 mg  30 mg SubCUTAneous DAILY    diphenhydrAMINE (BENADRYL) injection 12.5 mg  12.5 mg IntraVENous Q6H PRN    [Held by provider] metFORMIN (GLUCOPHAGE) tablet 1,000 mg  1,000 mg Oral BID WITH MEALS    rosuvastatin (CRESTOR) tablet 20 mg  20 mg Oral QHS    sodium chloride (NS) flush 5-40 mL  5-40 mL IntraVENous Q8H    sodium chloride (NS) flush 5-40 mL  5-40 mL IntraVENous PRN    acetaminophen (TYLENOL) tablet 650 mg  650 mg Oral Q6H PRN    Or    acetaminophen (TYLENOL) suppository 650 mg  650 mg Rectal Q6H PRN    polyethylene glycol (MIRALAX) packet 17 g  17 g Oral DAILY PRN    promethazine (PHENERGAN) tablet 12.5 mg  12.5 mg Oral Q6H PRN    Or    ondansetron (ZOFRAN) injection 4 mg  4 mg IntraVENous Q6H PRN     Lortab [hydrocodone-acetaminophen] and Acetaminophen  Social History     Socioeconomic History    Marital status:      Spouse name: Not on file    Number of children: Not on file    Years of education: Not on file    Highest education level: Not on file   Tobacco Use    Smoking status: Never Smoker    Smokeless tobacco: Never Used   Substance and Sexual Activity    Alcohol use: No    Drug use: No    Sexual activity: Yes     Partners: Male     Birth control/protection: Surgical   Other Topics Concern     Social History     Tobacco Use   Smoking Status Never Smoker   Smokeless Tobacco Never Used     Family History   Problem Relation Age of Onset    Heart Disease Father 76    Kidney Disease Father         hepatitis, post-surgery (tranfusion)    Cancer Mother [de-identified]        Breast    Breast Cancer Mother 80    Alzheimer Sister [de-identified]    Arthritis-osteo Sister     Arthritis-osteo Brother         Back surgery    Diabetes Neg Hx      ROS: The patient has no difficulty with chest pain or shortness of breath. No fever or chills. Comprehensive review of systems was otherwise unremarkable except as noted above. Physical Exam:   Visit Vitals  BP (!) 179/68 (BP 1 Location: Right arm, BP Patient Position: At rest)   Pulse 79   Temp 98.2 °F (36.8 °C)   Resp 18   Ht 5' 4\" (1.626 m)   Wt 134 lb (60.8 kg)   SpO2 93%   BMI 23.00 kg/m²     Vitals:    05/04/21 1937 05/04/21 2257 05/05/21 0246 05/05/21 0755   BP: (!) 187/73 (!) 155/72 (!) 188/70 (!) 179/68   Pulse: 89 78 87 79   Resp: 17 18 17 18   Temp: 98.4 °F (36.9 °C) 98.3 °F (36.8 °C) 98 °F (36.7 °C) 98.2 °F (36.8 °C)   SpO2: 94% 97% 92% 93%   Weight:       Height:         05/05 0701 - 05/05 1900  In: -   Out: 700 [Urine:700]  05/03 1901 - 05/05 0700  In: 3872 [I.V.:3872]  Out: 2600 [Urine:2600]    Constitutional: Alert, oriented, cooperative patient in no acute distress; appears stated age    Eyes:Sclera are clear. EOMs intact  ENMT: no external lesions gross hearing normal; no obvious neck masses, no ear or lip lesions, nares normal  CV: RRR. Normal perfusion  Resp: No JVD.   Breathing is non-labored; no audible wheezing. GI: soft with less distention, non tender, +BS; no peritoneal signs     Musculoskeletal: unremarkable with normal function. No embolic signs or cyanosis. Neuro:  Oriented; moves all 4; no focal deficits  Psychiatric: normal affect and mood, no memory impairment    Recent vitals (if inpt):  Patient Vitals for the past 24 hrs:   BP Temp Pulse Resp SpO2   05/05/21 0755 (!) 179/68 98.2 °F (36.8 °C) 79 18 93 %   05/05/21 0246 (!) 188/70 98 °F (36.7 °C) 87 17 92 %   05/04/21 2257 (!) 155/72 98.3 °F (36.8 °C) 78 18 97 %   05/04/21 1937 (!) 187/73 98.4 °F (36.9 °C) 89 17 94 %   05/04/21 1603 (!) 157/60 98 °F (36.7 °C) 77 17 95 %   05/04/21 1100 (!) 168/68 98.3 °F (36.8 °C) 81 19 97 %       Amount and/or Complexity of Data Reviewed and Analyzed:  I reviewed and analyzed all of the unique labs and radiologic studies that are shown below as well as any that are in the HPI, and any that are in the expanded problem list below  *Each unique test, order, or document contributes to the combination of 2 or combination of 3 in Category 1 below. For this visit I also reviewed old records and prior notes.       Recent Labs     05/05/21  0611   WBC 9.0   HGB 12.1         K 3.3*      CO2 24   BUN 6*   CREA 0.23*   *     Review of most recent CBC  Lab Results   Component Value Date/Time    WBC 9.0 05/05/2021 06:11 AM    HGB 12.1 05/05/2021 06:11 AM    HCT 34.0 (L) 05/05/2021 06:11 AM    PLATELET 806 36/65/7903 06:11 AM    MCV 93.2 05/05/2021 06:11 AM       Review of most recent BMP  Lab Results   Component Value Date/Time    Sodium 138 05/05/2021 06:11 AM    Potassium 3.3 (L) 05/05/2021 06:11 AM    Chloride 104 05/05/2021 06:11 AM    CO2 24 05/05/2021 06:11 AM    Anion gap 10 05/05/2021 06:11 AM    Glucose 107 (H) 05/05/2021 06:11 AM    BUN 6 (L) 05/05/2021 06:11 AM    Creatinine 0.23 (L) 05/05/2021 06:11 AM    BUN/Creatinine ratio 29 (H) 04/21/2021 10:34 AM    GFR est AA >60 05/05/2021 06:11 AM    GFR est non-AA >60 05/05/2021 06:11 AM    Calcium 8.5 05/05/2021 06:11 AM       Review of most recent LFTs (and lipase if done)  Lab Results   Component Value Date/Time    ALT (SGPT) 19 05/02/2021 08:28 AM    AST (SGOT) 18 05/02/2021 08:28 AM    Alk. phosphatase 46 (L) 05/02/2021 08:28 AM    Bilirubin, direct 0.11 04/21/2021 10:34 AM    Bilirubin, total 0.5 05/02/2021 08:28 AM     Lab Results   Component Value Date/Time    Lipase 48 (L) 04/27/2021 05:54 PM       Lab Results   Component Value Date/Time    Bilirubin, direct 0.11 04/21/2021 10:34 AM    Troponin-I <0.05 12/30/2010 05:07 AM       Review of most recent HgbA1c  Lab Results   Component Value Date/Time    Hemoglobin A1c 6.4 (H) 04/21/2021 10:34 AM       Nutritional assessment screen for wound healing issues:  Lab Results   Component Value Date/Time    Protein, total 5.4 (L) 05/02/2021 08:28 AM    Albumin 2.9 (L) 05/02/2021 08:28 AM       @lastcovr@  XR Results (most recent):  Results from [place] encounter on 04/27/21   XR ABD (KUB)    Narrative Exam: Single view abdomen. Indication: Enteric tube placement. Comparison: Abdomen film dated May 02, 2021. FINDINGS:  Enteric tube tip in the gastric body. Redemonstrated diffusely air-filled  distended loops of bowel of the upper and midabdomen with the lower abdomen not  imaged      Impression 1. Enteric tube tip in the gastric body. 2. Diffuse air-filled loops of bowel throughout the imaged abdomen suspicious  for ileus or obstruction. CT Results (most recent):  Results from Hospital Encounter encounter on 04/27/21   CT ABD PELV W CONT    Narrative CT of the Abdomen and Pelvis with contrast    CLINICAL INDICATION:  Follow-up of abnormal small bowel with subacute  progressive worsening generalized abdominal distention and pain. Spontaneous  bacterial peritonitis. COMPARISON: 4/26/2021    TECHNIQUE: Automated exposure Control was used.  Multiple axial images were  obtained through the abdomen and pelvis after intravenous injection of 125cc of  isovue 370 IV contrast to further evaluate vessels and organs. Coronal  reformatted images were done for further evaluation of bones and organs. Oral  contrast was given for further evaluation of GI tract and adjacent organs. FINDINGS: Partially included lung bases demonstrate a new small posterior  layering pleural effusion on the right and mild infiltrates/atelectasis in both  posterior lower lungs. No dense consolidation. Nonspecific mild wall thickening  of distal esophagus and probable tiny hiatal hernia. Abdomen: Unchanged indeterminate right adrenal nodule, most likely incidental  adenoma in the absence of clinical suspicion. The kidneys, adrenals, spleen,  liver, pancreas, gallbladder have not changed in 2 days. Right renal cyst again  noted. Diffuse atherosclerotic changes again noted of aorta. Contrast does  opacify major vessels. No evidence of free air, large volume ascites, or focal  fluid collection in the abdomen. There is increased tracer small volume of  abdominopelvic ascites. GI tract demonstrates contrast from level of stomach to rectum. Stomach,  duodenum, large bowel, distal ileum are not distended. There is a focal right  lower quadrant small bowel loop with wall thickening and surrounding  inflammatory stranding. Proximal to this there are multiple mildly dilated small  bowel loops which have slightly increased since prior. Nonspecific wall  thickening is also noted scattered and several other small bowel loops. Pelvis: No developing abscess, lymphadenopathy or mass. Small volume ascites. Bones: No acute osseous lesions. Impression 1. Abnormal small bowel, most prominently involving focal wall thickening and  inflammation in a right lower quadrant loop. 2. Dilatation of bowel loops proximal to this, likely partial small bowel  obstruction.  Contrast does progress to the level of the rectum. 3. Increased ascites, small volume. 4. Right small pleural effusion. Mild bibasilar lung infiltrates versus  atelectasis. US Results (most recent):  No results found for this or any previous visit. Admission date (for inpatients): 4/27/2021   * No surgery found *  * No surgery found *        ASSESSMENT/PLAN:  Problem List  Date Reviewed: 4/21/2021          Codes Class Noted    Hypomagnesemia ICD-10-CM: E83.42  ICD-9-CM: 275.2  5/1/2021        * (Principal) SBO (small bowel obstruction) ([facility] ICD-10-CM: M24.905  ICD-9-CM: 560.9  4/27/2021        Leukocytosis ICD-10-CM: X65.077  ICD-9-CM: 288.60  4/27/2021        UTI (urinary tract infection) ICD-10-CM: N39.0  ICD-9-CM: 599.0  4/27/2021        Lumbar radiculopathy ICD-10-CM: M54.16  ICD-9-CM: 724.4  2/19/2020        DDD (degenerative disc disease), lumbar ICD-10-CM: M51.36  ICD-9-CM: 722.52  2/19/2020        Gastroesophageal reflux disease without esophagitis ICD-10-CM: K21.9  ICD-9-CM: 530.81  6/7/2017        Type 2 diabetes mellitus without complication, without long-term current use of insulin ([facility] ICD-10-CM: E11.9  ICD-9-CM: 250.00  11/29/2016        Mixed hyperlipidemia ICD-10-CM: E78.2  ICD-9-CM: 272.2  11/29/2016        Osteopenia of multiple sites ICD-10-CM: M85.89  ICD-9-CM: 733.90  11/29/2016    Overview Signed 4/21/2021  8:19 AM by Maria D Riggins MD     Not compliant with multiple BMD orders.               Essential hypertension ICD-10-CM: I10  ICD-9-CM: 401.9  2/23/2016            Principal Problem:    SBO (small bowel obstruction) ([facility] (4/27/2021)    Active Problems:    Essential hypertension (2/23/2016)      Type 2 diabetes mellitus without complication, without long-term current use of insulin ([facility] [address] (11/29/2016)      Mixed hyperlipidemia (11/29/2016)      Gastroesophageal reflux disease without esophagitis (6/7/2017)      Leukocytosis (4/27/2021)      UTI (urinary tract infection) (4/27/2021)      Hypomagnesemia (5/1/2021) Number and Complexity of Problems addressed and   Risks of complications and/or morbidity of management      Partial SBO  Large BM, +flatus, improving.  No abdominal pain, -N/V.  WBC WNL    Continue inpt admission  Removed NGT; start CLD (may have grits)    Maint IVF  Replace lytes PRN  Serial exam  Watch renal function daily with IV meds and hydration        KARYN Kitchen

## 2021-05-05 NOTE — PROGRESS NOTES
Pts /73. MD notified. No orders given at this time. Patient resting comfortably in bed. Will continue to monitor.

## 2021-05-06 ENCOUNTER — APPOINTMENT (OUTPATIENT)
Dept: GENERAL RADIOLOGY | Age: 81
DRG: 389 | End: 2021-05-06
Attending: FAMILY MEDICINE
Payer: MEDICARE

## 2021-05-06 LAB
ANION GAP SERPL CALC-SCNC: 6 MMOL/L (ref 7–16)
BASOPHILS # BLD: 0 K/UL (ref 0–0.2)
BASOPHILS NFR BLD: 1 % (ref 0–2)
BUN SERPL-MCNC: 5 MG/DL (ref 8–23)
CALCIUM SERPL-MCNC: 8.5 MG/DL (ref 8.3–10.4)
CHLORIDE SERPL-SCNC: 104 MMOL/L (ref 98–107)
CO2 SERPL-SCNC: 28 MMOL/L (ref 21–32)
CREAT SERPL-MCNC: 0.33 MG/DL (ref 0.6–1)
DIFFERENTIAL METHOD BLD: ABNORMAL
EOSINOPHIL # BLD: 0.2 K/UL (ref 0–0.8)
EOSINOPHIL NFR BLD: 2 % (ref 0.5–7.8)
ERYTHROCYTE [DISTWIDTH] IN BLOOD BY AUTOMATED COUNT: 12.1 % (ref 11.9–14.6)
GLUCOSE BLD STRIP.AUTO-MCNC: 111 MG/DL (ref 65–100)
GLUCOSE BLD STRIP.AUTO-MCNC: 115 MG/DL (ref 65–100)
GLUCOSE BLD STRIP.AUTO-MCNC: 118 MG/DL (ref 65–100)
GLUCOSE BLD STRIP.AUTO-MCNC: 135 MG/DL (ref 65–100)
GLUCOSE BLD STRIP.AUTO-MCNC: 164 MG/DL (ref 65–100)
GLUCOSE SERPL-MCNC: 129 MG/DL (ref 65–100)
HCT VFR BLD AUTO: 35.8 % (ref 35.8–46.3)
HGB BLD-MCNC: 12.5 G/DL (ref 11.7–15.4)
IMM GRANULOCYTES # BLD AUTO: 0 K/UL (ref 0–0.5)
IMM GRANULOCYTES NFR BLD AUTO: 1 % (ref 0–5)
LYMPHOCYTES # BLD: 1.2 K/UL (ref 0.5–4.6)
LYMPHOCYTES NFR BLD: 14 % (ref 13–44)
MAGNESIUM SERPL-MCNC: 2 MG/DL (ref 1.8–2.4)
MCH RBC QN AUTO: 32.9 PG (ref 26.1–32.9)
MCHC RBC AUTO-ENTMCNC: 34.9 G/DL (ref 31.4–35)
MCV RBC AUTO: 94.2 FL (ref 79.6–97.8)
MONOCYTES # BLD: 1 K/UL (ref 0.1–1.3)
MONOCYTES NFR BLD: 12 % (ref 4–12)
NEUTS SEG # BLD: 6.4 K/UL (ref 1.7–8.2)
NEUTS SEG NFR BLD: 72 % (ref 43–78)
NRBC # BLD: 0 K/UL (ref 0–0.2)
PLATELET # BLD AUTO: 294 K/UL (ref 150–450)
PMV BLD AUTO: 9.8 FL (ref 9.4–12.3)
POTASSIUM SERPL-SCNC: 4.1 MMOL/L (ref 3.5–5.1)
RBC # BLD AUTO: 3.8 M/UL (ref 4.05–5.2)
SERVICE CMNT-IMP: ABNORMAL
SODIUM SERPL-SCNC: 138 MMOL/L (ref 136–145)
WBC # BLD AUTO: 8.9 K/UL (ref 4.3–11.1)

## 2021-05-06 PROCEDURE — 74018 RADEX ABDOMEN 1 VIEW: CPT

## 2021-05-06 PROCEDURE — 83735 ASSAY OF MAGNESIUM: CPT

## 2021-05-06 PROCEDURE — 74011250636 HC RX REV CODE- 250/636: Performed by: INTERNAL MEDICINE

## 2021-05-06 PROCEDURE — 74011250637 HC RX REV CODE- 250/637: Performed by: FAMILY MEDICINE

## 2021-05-06 PROCEDURE — C9113 INJ PANTOPRAZOLE SODIUM, VIA: HCPCS | Performed by: INTERNAL MEDICINE

## 2021-05-06 PROCEDURE — 80048 BASIC METABOLIC PNL TOTAL CA: CPT

## 2021-05-06 PROCEDURE — 74011000250 HC RX REV CODE- 250: Performed by: INTERNAL MEDICINE

## 2021-05-06 PROCEDURE — 36415 COLL VENOUS BLD VENIPUNCTURE: CPT

## 2021-05-06 PROCEDURE — 74011636637 HC RX REV CODE- 636/637: Performed by: INTERNAL MEDICINE

## 2021-05-06 PROCEDURE — 85025 COMPLETE CBC W/AUTO DIFF WBC: CPT

## 2021-05-06 PROCEDURE — 74011250636 HC RX REV CODE- 250/636: Performed by: HOSPITALIST

## 2021-05-06 PROCEDURE — 74011250636 HC RX REV CODE- 250/636: Performed by: FAMILY MEDICINE

## 2021-05-06 PROCEDURE — 65270000029 HC RM PRIVATE

## 2021-05-06 PROCEDURE — 82962 GLUCOSE BLOOD TEST: CPT

## 2021-05-06 RX ORDER — FACIAL-BODY WIPES
10 EACH TOPICAL DAILY
Status: DISCONTINUED | OUTPATIENT
Start: 2021-05-06 | End: 2021-05-08 | Stop reason: HOSPADM

## 2021-05-06 RX ORDER — HYDRALAZINE HYDROCHLORIDE 50 MG/1
50 TABLET, FILM COATED ORAL 3 TIMES DAILY
Status: DISCONTINUED | OUTPATIENT
Start: 2021-05-06 | End: 2021-05-08 | Stop reason: HOSPADM

## 2021-05-06 RX ORDER — HYDRALAZINE HYDROCHLORIDE 25 MG/1
25 TABLET, FILM COATED ORAL 3 TIMES DAILY
Status: DISCONTINUED | OUTPATIENT
Start: 2021-05-06 | End: 2021-05-06

## 2021-05-06 RX ADMIN — BISACODYL 10 MG: 10 SUPPOSITORY RECTAL at 16:51

## 2021-05-06 RX ADMIN — Medication 10 ML: at 06:00

## 2021-05-06 RX ADMIN — DIPHENHYDRAMINE HYDROCHLORIDE 12.5 MG: 50 INJECTION, SOLUTION INTRAMUSCULAR; INTRAVENOUS at 22:27

## 2021-05-06 RX ADMIN — INSULIN LISPRO 2 UNITS: 100 INJECTION, SOLUTION INTRAVENOUS; SUBCUTANEOUS at 16:52

## 2021-05-06 RX ADMIN — Medication 10 ML: at 14:00

## 2021-05-06 RX ADMIN — ROSUVASTATIN 20 MG: 20 TABLET, FILM COATED ORAL at 22:27

## 2021-05-06 RX ADMIN — Medication 10 ML: at 22:27

## 2021-05-06 RX ADMIN — HYDRALAZINE HYDROCHLORIDE 50 MG: 50 TABLET, FILM COATED ORAL at 22:27

## 2021-05-06 RX ADMIN — PANTOPRAZOLE SODIUM 40 MG: 40 INJECTION, POWDER, FOR SOLUTION INTRAVENOUS at 08:14

## 2021-05-06 RX ADMIN — ENOXAPARIN SODIUM 30 MG: 100 INJECTION SUBCUTANEOUS at 08:14

## 2021-05-06 RX ADMIN — HYDRALAZINE HYDROCHLORIDE 50 MG: 50 TABLET, FILM COATED ORAL at 08:13

## 2021-05-06 RX ADMIN — HYDRALAZINE HYDROCHLORIDE 10 MG: 20 INJECTION INTRAMUSCULAR; INTRAVENOUS at 02:58

## 2021-05-06 RX ADMIN — HYDRALAZINE HYDROCHLORIDE 50 MG: 50 TABLET, FILM COATED ORAL at 16:52

## 2021-05-06 NOTE — PROGRESS NOTES
H&P/Consult Note/Progress Note/Office Note:   Flores Forrest  MRN: [id number]  KARLEE:[birth year]  Age:80 y.o.    HPI: Flores Forrest is a 68 y.o. female who was admitted on 4/27/20 by the Hospitalists   after she presented to the ER a 2nd time in 2 days for with constant and progressive N/V/lower abd pain for 2 days. Nothing made symptoms better or worse. No associated fever  She is s/p hysterectomy approx 2011    CT on 4/28/21 showed thickening of focal small bowel wall with proximal dilation and likely partial SBO  Dr Katerine Darnell was consulted for SBO on 4/29/21. Her symptoms worsened today and she had the below abd Xray with AF levels and signs of persistent SBO       4/28/21 CT abd/pelvis with oral and IV contrast  Hx:  Follow-up of abnormal small bowel with subacute progressive worsening generalized abdominal distention and pain. Spontaneous bacterial peritonitis.      FINDINGS: Partially included lung bases demonstrate a new small posterior  layering pleural effusion on the right and mild infiltrates/atelectasis in both  posterior lower lungs. No dense consolidation. Nonspecific mild wall thickening  of distal esophagus and probable tiny hiatal hernia.     Abdomen: Unchanged indeterminate right adrenal nodule, most likely incidental  adenoma in the absence of clinical suspicion. The kidneys, adrenals, spleen,  liver, pancreas, gallbladder have not changed in 2 days. Right renal cyst again  noted. Diffuse atherosclerotic changes again noted of aorta. Contrast does  opacify major vessels. No evidence of free air, large volume ascites, or focal  fluid collection in the abdomen. There is increased tracer small volume of  abdominopelvic ascites.     GI tract demonstrates contrast from level of stomach to rectum. Stomach,  duodenum, large bowel, distal ileum are not distended. There is a focal right  lower quadrant small bowel loop with wall thickening and surrounding  inflammatory stranding.  Proximal to this there are multiple mildly dilated small  bowel loops which have slightly increased since prior. Nonspecific wall thickening is also noted scattered and several other small bowel loops. nura: No developing abscess, lymphadenopathy or mass. Small volume ascites.     Bones: No acute osseous lesions. IMPRESSION  1. Abnormal small bowel, most prominently involving focal wall thickening and inflammation in a right lower quadrant loop. 2. Dilatation of bowel loops proximal to this, likely partial small bowel obstruction. Contrast does progress to the level of the rectum. 3. Increased ascites, small volume. 4. Right small pleural effusion. Mild bibasilar lung infiltrates versus atelectasis        5/1/21 abd Xray, 2views  Hx:  Abdominal pain and bloating    Increased small bowel distention. Multiple air-fluid levels are now present. Contrast is present in the colon from prior CT scan. There is no free air. There are no renal calculi. The lung bases are clear.     IMPRESSION  Increased small bowel distention, likely high-grade obstruction     5/3/21  Pt denies N/V or abdominal pain. +BM x 2, no flatus. Remains distended. Feels poorly in general.  Afebrile. WBC 11. K+ 3.7. NGT with 1L output. KUB from yesterday demonstrates contrast in the colon and rectum. 5/4/21 Pt denies N/V or abdominal pain. +BM, + flatus. Less distended. Still feels poorly in general.  Afebrile. WBC 11. K+ 3.5. NGT with 200mL output- came out and was replace overnight. 5/5/21  Pt denies abdominal pain, N/V. +flatus. WBC WNL. Afebrile. Tolerating NGT clamping overnight. 5/6/21  Pt denies abdominal pain, N/V. +flatus. WBC 8. Afebrile. BM X 1 recorded. Tolerating CLD and grits. Feels weak.     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  HX PARTIAL THYROIDECTOMY      Lobectomy for benign mass.     NEUROLOGICAL PROCEDURE UNLISTED  2014    [place] [place]     Current Facility-Administered Medications   Medication Dose Route Frequency    hydrALAZINE (APRESOLINE) tablet 50 mg  50 mg Oral TID    hydrALAZINE (APRESOLINE) 20 mg/mL injection 10 mg  10 mg IntraVENous Q6H PRN    pantoprazole (PROTONIX) 40 mg in 0.9% sodium chloride 10 mL injection  40 mg IntraVENous DAILY    insulin lispro (HUMALOG) injection   SubCUTAneous TIDAC    lactated Ringers infusion  75 mL/hr IntraVENous CONTINUOUS    phenol throat spray (CHLORASEPTIC) 1 Spray  1 Spray Oral PRN    enoxaparin (LOVENOX) injection 30 mg  30 mg SubCUTAneous DAILY    diphenhydrAMINE (BENADRYL) injection 12.5 mg  12.5 mg IntraVENous Q6H PRN    [Held by provider] metFORMIN (GLUCOPHAGE) tablet 1,000 mg  1,000 mg Oral BID WITH MEALS    rosuvastatin (CRESTOR) tablet 20 mg  20 mg Oral QHS    sodium chloride (NS) flush 5-40 mL  5-40 mL IntraVENous Q8H    sodium chloride (NS) flush 5-40 mL  5-40 mL IntraVENous PRN    acetaminophen (TYLENOL) tablet 650 mg  650 mg Oral Q6H PRN    Or    acetaminophen (TYLENOL) suppository 650 mg  650 mg Rectal Q6H PRN    polyethylene glycol (MIRALAX) packet 17 g  17 g Oral DAILY PRN    promethazine (PHENERGAN) tablet 12.5 mg  12.5 mg Oral Q6H PRN    Or    ondansetron (ZOFRAN) injection 4 mg  4 mg IntraVENous Q6H PRN     Lortab [hydrocodone-acetaminophen] and Acetaminophen  Social History     Socioeconomic History    Marital status:      Spouse name: Not on file    Number of children: Not on file    Years of education: Not on file    Highest education level: Not on file   Tobacco Use    Smoking status: Never Smoker    Smokeless tobacco: Never Used   Substance and Sexual Activity    Alcohol use: No    Drug use: No    Sexual activity: Yes     Partners: Male     Birth control/protection: Surgical   Other Topics Concern     Social History     Tobacco Use   Smoking Status Never Smoker   Smokeless Tobacco Never Used     Family History   Problem Relation Age of Onset    Heart Disease Father 76    Kidney Disease Father         hepatitis, post-surgery (tranfusion)    Cancer Mother [de-identified]        Breast    Breast Cancer Mother 80    Alzheimer Sister [de-identified]    Arthritis-osteo Sister     Arthritis-osteo Brother         Back surgery    Diabetes Neg Hx      ROS: The patient has no difficulty with chest pain or shortness of breath. No fever or chills. Comprehensive review of systems was otherwise unremarkable except as noted above. Physical Exam:   Visit Vitals  BP (!) 172/66 (BP 1 Location: Right arm, BP Patient Position: At rest)   Pulse 93   Temp 98.1 °F (36.7 °C)   Resp 18   Ht 5' 4\" (1.626 m)   Wt 134 lb (60.8 kg)   SpO2 94%   BMI 23.00 kg/m²     Vitals:    05/05/21 1925 05/05/21 2237 05/06/21 0220 05/06/21 0757   BP: (!) 179/71 (!) 158/61 (!) 172/90 (!) 172/66   Pulse: 83 (!) 58 90 93   Resp: 18 18 18 18   Temp: 98 °F (36.7 °C) 98.5 °F (36.9 °C) 98.4 °F (36.9 °C) 98.1 °F (36.7 °C)   SpO2: 92% 99% 93% 94%   Weight:       Height:         No intake/output data recorded. 05/04 1901 - 05/06 0700  In: 2946 [P.O.:360; I.V.:2586]  Out: 2850 [Urine:2850]    Constitutional: Alert, oriented, cooperative patient in no acute distress; appears stated age    Eyes:Sclera are clear. EOMs intact  ENMT: no external lesions gross hearing normal; no obvious neck masses, no ear or lip lesions, nares normal  CV: RRR. Normal perfusion  Resp: No JVD. Breathing is  non-labored; no audible wheezing.     GI: soft with less distention, non tender, +BS; no peritoneal signs Musculoskeletal: unremarkable with normal function. No embolic signs or cyanosis. Neuro:  Oriented; moves all 4; no focal deficits  Psychiatric: normal affect and mood, no memory impairment    Recent vitals (if inpt):  Patient Vitals for the past 24 hrs:   BP Temp Pulse Resp SpO2   05/06/21 0757 (!) 172/66 98.1 °F (36.7 °C) 93 18 94 %   05/06/21 0220 (!) 172/90 98.4 °F (36.9 °C) 90 18 93 %   05/05/21 2237 (!) 158/61 98.5 °F (36.9 °C) (!) 58 18 99 %   05/05/21 1925 (!) 179/71 98 °F (36.7 °C) 83 18 92 %   05/05/21 1529 (!) 159/64 98.1 °F (36.7 °C) 75 18 92 %   05/05/21 1152 (!) 165/72 98.2 °F (36.8 °C) 73 18 92 %   05/05/21 1020 (!) 169/62  82     05/05/21 0935 (!) 184/64  87         Amount and/or Complexity of Data Reviewed and Analyzed:  I reviewed and analyzed all of the unique labs and radiologic studies that are shown below as well as any that are in the HPI, and any that are in the expanded problem list below  *Each unique test, order, or document contributes to the combination of 2 or combination of 3 in Category 1 below. For this visit I also reviewed old records and prior notes.       Recent Labs     05/06/21  0241   WBC 8.9   HGB 12.5         K 4.1      CO2 28   BUN 5*   CREA 0.33*   *     Review of most recent CBC  Lab Results   Component Value Date/Time    WBC 8.9 05/06/2021 02:41 AM    HGB 12.5 05/06/2021 02:41 AM    HCT 35.8 05/06/2021 02:41 AM    PLATELET 671 84/47/8964 02:41 AM    MCV 94.2 05/06/2021 02:41 AM       Review of most recent BMP  Lab Results   Component Value Date/Time    Sodium 138 05/06/2021 02:41 AM    Potassium 4.1 05/06/2021 02:41 AM    Chloride 104 05/06/2021 02:41 AM    CO2 28 05/06/2021 02:41 AM    Anion gap 6 (L) 05/06/2021 02:41 AM    Glucose 129 (H) 05/06/2021 02:41 AM    BUN 5 (L) 05/06/2021 02:41 AM    Creatinine 0.33 (L) 05/06/2021 02:41 AM    BUN/Creatinine ratio 29 (H) 04/21/2021 10:34 AM    GFR est AA >60 05/06/2021 02:41 AM    GFR est non-AA >60 05/06/2021 02:41 AM    Calcium 8.5 05/06/2021 02:41 AM       Review of most recent LFTs (and lipase if done)  Lab Results   Component Value Date/Time    ALT (SGPT) 19 05/02/2021 08:28 AM    AST (SGOT) 18 05/02/2021 08:28 AM    Alk. phosphatase 46 (L) 05/02/2021 08:28 AM    Bilirubin, direct 0.11 04/21/2021 10:34 AM    Bilirubin, total 0.5 05/02/2021 08:28 AM     Lab Results   Component Value Date/Time    Lipase 48 (L) 04/27/2021 05:54 PM       Lab Results   Component Value Date/Time    Bilirubin, direct 0.11 04/21/2021 10:34 AM    Troponin-I <0.05 12/30/2010 05:07 AM       Review of most recent HgbA1c  Lab Results   Component Value Date/Time    Hemoglobin A1c 6.4 (H) 04/21/2021 10:34 AM       Nutritional assessment screen for wound healing issues:  Lab Results   Component Value Date/Time    Protein, total 5.4 (L) 05/02/2021 08:28 AM    Albumin 2.9 (L) 05/02/2021 08:28 AM       @lastcovr@  XR Results (most recent):  Results from [place] encounter on 04/27/21   XR ABD (KUB)    Narrative Exam: Single view abdomen. Indication: Enteric tube placement. Comparison: Abdomen film dated May 02, 2021. FINDINGS:  Enteric tube tip in the gastric body. Redemonstrated diffusely air-filled  distended loops of bowel of the upper and midabdomen with the lower abdomen not  imaged      Impression 1. Enteric tube tip in the gastric body. 2. Diffuse air-filled loops of bowel throughout the imaged abdomen suspicious  for ileus or obstruction. CT Results (most recent):  Results from Hospital Encounter encounter on 04/27/21   CT ABD PELV W CONT    Narrative CT of the Abdomen and Pelvis with contrast    CLINICAL INDICATION:  Follow-up of abnormal small bowel with subacute  progressive worsening generalized abdominal distention and pain. Spontaneous  bacterial peritonitis. COMPARISON: 4/26/2021    TECHNIQUE: Automated exposure Control was used.  Multiple axial images were  obtained through the abdomen and pelvis after intravenous injection of 125cc of  isovue 370 IV contrast to further evaluate vessels and organs. Coronal  reformatted images were done for further evaluation of bones and organs. Oral  contrast was given for further evaluation of GI tract and adjacent organs. FINDINGS: Partially included lung bases demonstrate a new small posterior  layering pleural effusion on the right and mild infiltrates/atelectasis in both  posterior lower lungs. No dense consolidation. Nonspecific mild wall thickening  of distal esophagus and probable tiny hiatal hernia. Abdomen: Unchanged indeterminate right adrenal nodule, most likely incidental  adenoma in the absence of clinical suspicion. The kidneys, adrenals, spleen,  liver, pancreas, gallbladder have not changed in 2 days. Right renal cyst again  noted. Diffuse atherosclerotic changes again noted of aorta. Contrast does  opacify major vessels. No evidence of free air, large volume ascites, or focal  fluid collection in the abdomen. There is increased tracer small volume of  abdominopelvic ascites. GI tract demonstrates contrast from level of stomach to rectum. Stomach,  duodenum, large bowel, distal ileum are not distended. There is a focal right  lower quadrant small bowel loop with wall thickening and surrounding  inflammatory stranding. Proximal to this there are multiple mildly dilated small  bowel loops which have slightly increased since prior. Nonspecific wall  thickening is also noted scattered and several other small bowel loops. Pelvis: No developing abscess, lymphadenopathy or mass. Small volume ascites. Bones: No acute osseous lesions. Impression 1. Abnormal small bowel, most prominently involving focal wall thickening and  inflammation in a right lower quadrant loop. 2. Dilatation of bowel loops proximal to this, likely partial small bowel  obstruction. Contrast does progress to the level of the rectum.   3. Increased ascites, small volume. 4. Right small pleural effusion. Mild bibasilar lung infiltrates versus  atelectasis. US Results (most recent):  No results found for this or any previous visit. Admission date (for inpatients): 4/27/2021   * No surgery found *  * No surgery found *        ASSESSMENT/PLAN:  Problem List  Date Reviewed: 4/21/2021          Codes Class Noted    Hypokalemia ICD-10-CM: E87.6  ICD-9-CM: 276.8  5/5/2021        Hypomagnesemia ICD-10-CM: E83.42  ICD-9-CM: 275.2  5/1/2021        * (Principal) SBO (small bowel obstruction) ([facility] ICD-10-CM: Z91.655  ICD-9-CM: 560.9  4/27/2021        Leukocytosis ICD-10-CM: C12.726  ICD-9-CM: 288.60  4/27/2021        UTI (urinary tract infection) ICD-10-CM: N39.0  ICD-9-CM: 599.0  4/27/2021        Lumbar radiculopathy ICD-10-CM: M54.16  ICD-9-CM: 724.4  2/19/2020        DDD (degenerative disc disease), lumbar ICD-10-CM: M51.36  ICD-9-CM: 722.52  2/19/2020        Gastroesophageal reflux disease without esophagitis ICD-10-CM: K21.9  ICD-9-CM: 530.81  6/7/2017        Type 2 diabetes mellitus without complication, without long-term current use of insulin ([facility] ICD-10-CM: E11.9  ICD-9-CM: 250.00  11/29/2016        Mixed hyperlipidemia ICD-10-CM: E78.2  ICD-9-CM: 272.2  11/29/2016        Osteopenia of multiple sites ICD-10-CM: M85.89  ICD-9-CM: 733.90  11/29/2016    Overview Signed 4/21/2021  8:19 AM by Serina Sadler MD     Not compliant with multiple BMD orders.               Essential hypertension ICD-10-CM: I10  ICD-9-CM: 401.9  2/23/2016            Principal Problem:    SBO (small bowel obstruction) ([address] (4/27/2021)    Active Problems:    Essential hypertension (2/23/2016)      Type 2 diabetes mellitus without complication, without long-term current use of insulin ([facility] (11/29/2016)      Mixed hyperlipidemia (11/29/2016)      Gastroesophageal reflux disease without esophagitis (6/7/2017)      Leukocytosis (4/27/2021)      UTI (urinary tract infection) (4/27/2021) Hypomagnesemia (5/1/2021)      Hypokalemia (5/5/2021)           Number and Complexity of Problems addressed and   Risks of complications and/or morbidity of management      Partial SBO   No abdominal pain, -N/V.       Continue inpt admission    Advance to [address]  Maint IVF  Replace lytes PRN  Serial exam  Watch renal function daily with IV meds and hydration  Continue current care per primary team      KARYN Cochran

## 2021-05-06 NOTE — PROGRESS NOTES
OT Note:    At time of AM OT attempt, pt requesting therapy to come back after she has some lunch. Pt reports feeling weak right now but was able to shower and take care of all self care needs this AM. Will check back as time allows. PM attempt: Pt back in bed, stomach distended, reporting that BP has \"been all over the place\" and not feeling well now. Declined OT this attempt.      Thank you,  Taylor Reynoso, OTR/L

## 2021-05-06 NOTE — PROGRESS NOTES
Care Management Interventions  PCP Verified by CM: Yes  Palliative Care Criteria Met (RRAT>21 & CHF Dx)?: No(Dx Gastrenteritis Risk 9%)  Mode of Transport at Discharge: Self  Transition of Care Consult (CM Consult): Discharge Planning  Discharge Durable Medical Equipment: No  Physical Therapy Consult: Yes  Occupational Therapy Consult: Yes  Speech Therapy Consult: No  Current Support Network: Lives with Spouse  Confirm Follow Up Transport: Family  The Plan for Transition of Care is Related to the Following Treatment Goals : debility  The Patient and/or Patient Representative was Provided with a Choice of Provider and Agrees with the Discharge Plan?: Yes  Name of the Patient Representative Who was Provided with a Choice of Provider and Agrees with the Discharge Plan: Patient   Freedom of Choice List was Provided with Basic Dialogue that Supports the Patient's Individualized Plan of Care/Goals, Treatment Preferences and Shares the Quality Data Associated with the Providers?: Yes  Discharge Location  Discharge Placement: Home with home health  Saw pt in interdisciplinarily rounds with the following disciplines: Physician, Case Management, Nursing, Dietary,Therapy and Pharmacy. The plan of care was discussed along with goals for discharge date/ location. Per MD patient will discharge home tomorrow if they have BM today. Patient's diet has been advance to full liquid and she has positive flatus.

## 2021-05-06 NOTE — PROGRESS NOTES
Comprehensive Nutrition Assessment    Type and Reason for Visit: Reassess      Nutrition Recommendations/Plan:    Add NCS component to current diet d/t hx of DM and prior expressed concerns about high sugar content items on meal trays   . Glucerna shake tid instead of standard Ensure Enlive on full liquid meal trays. Malnutrition Assessment:  Malnutrition Status: At risk for malnutrition (specify)(Intake < 50% of estimated needs since admission)  Nutrition Assessment:   Nutrition History: 5/3: Pt reports normal intake 8 days ago of 3 meals.  reports pt also snacks sometimes and does not eat large meals. He has never been been able to get her to accept a protein drink. Pt denies any weight loss PTA.    5/3: Pt reports normal intake 8 days ago of 3 meals.  reports pt also snacks sometimes and does not eat large meals. He has never been been able to get her to accept a protein drink. Pt denies any weight loss PTA. Nutrition Background: H/O: DDD, DM, GERD, HLD, HTN. Presented with 2 day hx of constant and progressive N/V/lower abd pain. CT on 4/28/21 showed thickening of focal small bowel wall with proximal dilation and likely partial SBO.5/1 abd xray  showed increased small bowel distention, likely high-grade obstruction  Daily Update:  Pt with eyes closes/resting. Did not awaken to name or RD observation of lunch tray. Pt consumed < 25% but ~30-40% of Ensure Enlive. No family currently in room.  and pt had  expressed concern about high content of Ensure Enlive last week when pt was on a full liquid diet. BYN 5-potentially c/w decline in protein needs  Nutrition Related Findings:   4/27: NPO the clear liquid. 4/28: NPO, then clear liquid. 4/29: Full liquid. 4/30: Low fiber.  5/1: NPO and NGT to LIS as pt did not tolerate attempts at diet progression 5/5 Clear liquid with grits, 5/6 Full liquid with scrambled eggs      Current Nutrition Therapies:  DIET FULL LIQUID    Current Intake: Average Meal Intake: 1-25% Average Supplement Intake: 26-50%      Anthropometric Measures:  Height: 5' 4\" (162.6 cm)  Current Body Wt: (none),    BMI:  , Normal weight (BMI 22.0-24.9) age over 72  Admission Body Weight: 134 lb 0.6 oz(stated)  Ideal Body Weight (lbs) (Calculated): 120 lbs (55 kg),    Usual Body Wt: (132# stated, 136-141# per EMR for past 1 year)        Edema: No data recorded   Estimated Daily Nutrient Needs:  Energy (kcal/day): 5041-9596 (Kcal/kg(25-30), Weight Used: Admission(Stated 60.8 kg))  Protein (g/day): 76-91 Weight Used: ((20% of kcal))      Nutrition Diagnosis:   · Inadequate oral intake related to altered GI function, inadequate protein-energy intake as evidenced by (intake < 25% of needs), resolving SBO    Nutrition Interventions:   Food and/or Nutrient Delivery: Continue current diet, Modify oral nutrition supplement, Modify current diet     Coordination of Nutrition Care: Continue to monitor while inpatient    Goals:   Previous Goal Met: Progressing toward goal(s)  Active Goal: Intake > 50% of estimated needs within 3 days    Nutrition Monitoring and Evaluation:      Food/Nutrient Intake Outcomes: Food and nutrient intake, Diet advancement/tolerance, Supplement intake  Physical Signs/Symptoms Outcomes: GI status    Discharge Planning:    Continue oral nutrition supplement    Tamra Pickens, [address], [address] [place] [address]

## 2021-05-06 NOTE — PROGRESS NOTES
[facility]ist Progress Note     Name:  Marianela Weiner  Age:80 y.o. Sex:female   :  1940    MRN:  [id number]     Admit Date:  2021    Reason for Admission:  Acute gastroenteritis [K52.9]    Hospital Course/Interval history:     Briefly, 53-year-old female with significant past medical history of diabetes mellitus and GERD presented to the hospital with abdominal pain, nausea and vomiting. She was initially diagnosed with UTI and she was treated with ceftriaxone. In addition, her initial CT was concerning for small bowel obstruction which was reviewed by surgeon. Patient was treated for ileus and she had 2 bowel movement. Patient improved but on the night of , patient started having some belly pain and abdominal distention. X-ray on May 1 was consistent with SBO. General surgery consulted. Patient started on NG tube with low intermittent suction on May 1. Patient still has NG tube. General surgery following and as per nurse they recommended medications intermittently. Continue otherwise n.p.o. status.        May 4: As per patient she passed gas one time yesterday and had 1 bowel movement. Intermittent abdominal pain. Overnight while sleeping, NG tube came out. Denies any nausea or vomiting. NG tube in place but currently on hold as per surgeon recommendation per nurse. Rest review of system negative except mentioned above. Subjective (21):    2021  NG tube were DC'd this a.m. .  As per surgery okay to start clears. 2021  C/o abdominal distention. Passing flatus,no bowel movement yet    Review of Systems: 14 point review of systems is otherwise negative with the exception of the elements mentioned above. Assessment & Plan     -Small bowel obstruction-off NG tube 2021-started on clears-surgery following.   2021- c/o abdominal distention- ordered repeat kub- improving distention and bowel wall thickening  -Uncontrolled hypertension-dced clonidine and added hydralazine  -Hypokalemia and hypomagnesemia-resolved  -E. coli UTI-finished course of Rocephin  -Diabetes mellitus type 2-continue sliding scale      Diet:  DIET NUTRITIONAL SUPPLEMENTS  DIET FULL LIQUID  DVT PPx:lovenox  Code status: Full Code  Disposition/Expected LOS            Objective:     Patient Vitals for the past 24 hrs:   Temp Pulse Resp BP SpO2   05/06/21 1253  94 18 (!) 155/55 93 %   05/06/21 1211  (!) 104  (!) 144/51    05/06/21 1138 98.2 °F (36.8 °C) 91 18 (!) 151/55 94 %   05/06/21 0757 98.1 °F (36.7 °C) 93 18 (!) 172/66 94 %   05/06/21 0220 98.4 °F (36.9 °C) 90 18 (!) 172/90 93 %   05/05/21 2237 98.5 °F (36.9 °C) (!) 58 18 (!) 158/61 99 %   05/05/21 1925 98 °F (36.7 °C) 83 18 (!) 179/71 92 %   05/05/21 1529 98.1 °F (36.7 °C) 75 18 (!) 159/64 92 %     Oxygen Therapy  O2 Sat (%): 93 % (05/06/21 1253)  Pulse via Oximetry: 74 beats per minute (04/27/21 2026)  O2 Device: None (Room air) (05/06/21 0726)    Body mass index is 23 kg/m².     Physical Exam:   General:  No acute distress, speaking in full sentences, no use of accessory muscles   HEENT-pupils equal normal reactive to light neck supple throat normal  History of brain surgery, chronic left facial droop  Lungs:  Clear to auscultation bilaterally   CV:  Regular rate and rhythm with normal S1 and S2   Abdomen:  Soft, nontender, nondistended, normoactive bowel sounds   Extremities:  No cyanosis clubbing or edema   Neuro:  Nonfocal, A&O x3   Psych:  Normal affect     Data Review:  I have reviewed all labs, meds, and studies from the last 24 hours:    Labs:    Recent Results (from the past 24 hour(s))   GLUCOSE, POC    Collection Time: 05/05/21  4:31 PM   Result Value Ref Range    Glucose (POC) 143 (H) 65 - 100 mg/dL    Performed by Barbara    GLUCOSE, POC    Collection Time: 05/05/21  9:23 PM   Result Value Ref Range    Glucose (POC) 138 (H) 65 - 100 mg/dL    Performed by Shweta    CBC WITH AUTOMATED DIFF Collection Time: 05/06/21  2:41 AM   Result Value Ref Range    WBC 8.9 4.3 - 11.1 K/uL    RBC 3.80 (L) 4.05 - 5.2 M/uL    HGB 12.5 11.7 - 15.4 g/dL    HCT 35.8 35.8 - 46.3 %    MCV 94.2 79.6 - 97.8 FL    MCH 32.9 26.1 - 32.9 PG    MCHC 34.9 31.4 - 35.0 g/dL    RDW 12.1 11.9 - 14.6 %    PLATELET 213 907 - 206 K/uL    MPV 9.8 9.4 - 12.3 FL    ABSOLUTE NRBC 0.00 0.0 - 0.2 K/uL    DF AUTOMATED      NEUTROPHILS 72 43 - 78 %    LYMPHOCYTES 14 13 - 44 %    MONOCYTES 12 4.0 - 12.0 %    EOSINOPHILS 2 0.5 - 7.8 %    BASOPHILS 1 0.0 - 2.0 %    IMMATURE GRANULOCYTES 1 0.0 - 5.0 %    ABS. NEUTROPHILS 6.4 1.7 - 8.2 K/UL    ABS. LYMPHOCYTES 1.2 0.5 - 4.6 K/UL    ABS. MONOCYTES 1.0 0.1 - 1.3 K/UL    ABS. EOSINOPHILS 0.2 0.0 - 0.8 K/UL    ABS. BASOPHILS 0.0 0.0 - 0.2 K/UL    ABS. IMM.  GRANS. 0.0 0.0 - 0.5 K/UL   METABOLIC PANEL, BASIC    Collection Time: 05/06/21  2:41 AM   Result Value Ref Range    Sodium 138 136 - 145 mmol/L    Potassium 4.1 3.5 - 5.1 mmol/L    Chloride 104 98 - 107 mmol/L    CO2 28 21 - 32 mmol/L    Anion gap 6 (L) 7 - 16 mmol/L    Glucose 129 (H) 65 - 100 mg/dL    BUN 5 (L) 8 - 23 MG/DL    Creatinine 0.33 (L) 0.6 - 1.0 MG/DL    GFR est AA >60 >60 ml/min/1.73m2    GFR est non-AA >60 >60 ml/min/1.73m2    Calcium 8.5 8.3 - 10.4 MG/DL   MAGNESIUM    Collection Time: 05/06/21  2:41 AM   Result Value Ref Range    Magnesium 2.0 1.8 - 2.4 mg/dL   GLUCOSE, POC    Collection Time: 05/06/21  4:26 AM   Result Value Ref Range    Glucose (POC) 118 (H) 65 - 100 mg/dL    Performed by Shweta    GLUCOSE, POC    Collection Time: 05/06/21  7:52 AM   Result Value Ref Range    Glucose (POC) 135 (H) 65 - 100 mg/dL    Performed by Barbara    GLUCOSE, POC    Collection Time: 05/06/21 11:29 AM   Result Value Ref Range    Glucose (POC) 115 (H) 65 - 100 mg/dL    Performed by Barbara        All Micro Results     Procedure Component Value Units Date/Time    CULTURE, BLOOD [[id number]] Collected: 04/27/21 2036 Order Status: Completed Specimen: Blood Updated: 05/02/21 0742     Special Requests: --        RIGHT  Antecubital       Culture result: NO GROWTH 5 DAYS       CULTURE, BLOOD [[id number]] Collected: 04/27/21 1854    Order Status: Completed Specimen: Blood Updated: 05/02/21 0742     Special Requests: --        NO SPECIAL REQUESTS  LEFT  HAND       Culture result: NO GROWTH 5 DAYS             EKG Results     None          Other Studies:  Xr Abd (kub)    Result Date: 5/6/2021  EXAMINATION: ABDOMINAL RADIOGRAPHS 5/6/2021 1:37 PM ACCESSION NUMBER: [id number] INDICATION: abdominal distention, recent small bowel obstruction COMPARISON: Abdominal x-ray 5/3/2021, 5/2/2021, CT abdomen and pelvis 4/28/2021 TECHNIQUE: A single AP supine view of the abdomen was obtained. FINDINGS: Small bowel distention is improved in comparison to the 5/2/2021 exam. There is persistent small bowel dilation, measuring up to 3.4 cm. There is stool and gas in the rectum. Scattered small bowel wall thickening, improved in comparison to the 5/2/2021 exam. An enteric tube is not present within the field-of-view. Lumbar spine spondylosis. No radiopaque foreign body. 1. Persistent but improving small bowel distention and small bowel wall thickening in comparison to the 5/2/2021 exam. 2. An enteric tube is not visualized on this examination.  VOICE DICTATED BY: Dr. Corie Aguirre       Current Meds:   Current Facility-Administered Medications   Medication Dose Route Frequency    hydrALAZINE (APRESOLINE) tablet 50 mg  50 mg Oral TID    hydrALAZINE (APRESOLINE) 20 mg/mL injection 10 mg  10 mg IntraVENous Q6H PRN    pantoprazole (PROTONIX) 40 mg in 0.9% sodium chloride 10 mL injection  40 mg IntraVENous DAILY    insulin lispro (HUMALOG) injection   SubCUTAneous TIDAC    lactated Ringers infusion  75 mL/hr IntraVENous CONTINUOUS    phenol throat spray (CHLORASEPTIC) 1 Spray  1 Spray Oral PRN    enoxaparin (LOVENOX) injection 30 mg  30 mg SubCUTAneous DAILY    diphenhydrAMINE (BENADRYL) injection 12.5 mg  12.5 mg IntraVENous Q6H PRN    [Held by provider] metFORMIN (GLUCOPHAGE) tablet 1,000 mg  1,000 mg Oral BID WITH MEALS    rosuvastatin (CRESTOR) tablet 20 mg  20 mg Oral QHS    sodium chloride (NS) flush 5-40 mL  5-40 mL IntraVENous Q8H    sodium chloride (NS) flush 5-40 mL  5-40 mL IntraVENous PRN    acetaminophen (TYLENOL) tablet 650 mg  650 mg Oral Q6H PRN    Or    acetaminophen (TYLENOL) suppository 650 mg  650 mg Rectal Q6H PRN    polyethylene glycol (MIRALAX) packet 17 g  17 g Oral DAILY PRN    promethazine (PHENERGAN) tablet 12.5 mg  12.5 mg Oral Q6H PRN    Or    ondansetron (ZOFRAN) injection 4 mg  4 mg IntraVENous Q6H PRN       Problem List:  Hospital Problems as of 5/6/2021 Date Reviewed: 4/21/2021          Codes Class Noted - Resolved POA    Hypokalemia ICD-10-CM: E87.6  ICD-9-CM: 276.8  5/5/2021 - Present Unknown        Hypomagnesemia ICD-10-CM: E83.42  ICD-9-CM: 275.2  5/1/2021 - Present No        * (Principal) SBO (small bowel obstruction) ([facility] ICD-10-CM: L90.166  ICD-9-CM: 560.9  4/27/2021 - Present Yes        Leukocytosis ICD-10-CM: W06.171  ICD-9-CM: 288.60  4/27/2021 - Present Yes        UTI (urinary tract infection) ICD-10-CM: N39.0  ICD-9-CM: 599.0  4/27/2021 - Present Yes        Gastroesophageal reflux disease without esophagitis ICD-10-CM: K21.9  ICD-9-CM: 530.81  6/7/2017 - Present Yes        Type 2 diabetes mellitus without complication, without long-term current use of insulin (HCC) ICD-10-CM: E11.9  ICD-9-CM: 250.00  11/29/2016 - Present Yes        Mixed hyperlipidemia ICD-10-CM: E78.2  ICD-9-CM: 272.2  11/29/2016 - Present Yes        Essential hypertension ICD-10-CM: I10  ICD-9-CM: 401.9  2/23/2016 - Present Yes                   Signed By: Armando Sandy MD   [facility] Hospitalist Service    May 6, 2021

## 2021-05-07 LAB
ANION GAP SERPL CALC-SCNC: 8 MMOL/L (ref 7–16)
BASOPHILS # BLD: 0 K/UL (ref 0–0.2)
BASOPHILS NFR BLD: 0 % (ref 0–2)
BUN SERPL-MCNC: 3 MG/DL (ref 8–23)
CALCIUM SERPL-MCNC: 8.5 MG/DL (ref 8.3–10.4)
CHLORIDE SERPL-SCNC: 104 MMOL/L (ref 98–107)
CO2 SERPL-SCNC: 28 MMOL/L (ref 21–32)
CREAT SERPL-MCNC: 0.31 MG/DL (ref 0.6–1)
DIFFERENTIAL METHOD BLD: ABNORMAL
EOSINOPHIL # BLD: 0.2 K/UL (ref 0–0.8)
EOSINOPHIL NFR BLD: 2 % (ref 0.5–7.8)
ERYTHROCYTE [DISTWIDTH] IN BLOOD BY AUTOMATED COUNT: 12.4 % (ref 11.9–14.6)
GLUCOSE BLD STRIP.AUTO-MCNC: 138 MG/DL (ref 65–100)
GLUCOSE BLD STRIP.AUTO-MCNC: 143 MG/DL (ref 65–100)
GLUCOSE BLD STRIP.AUTO-MCNC: 144 MG/DL (ref 65–100)
GLUCOSE BLD STRIP.AUTO-MCNC: 150 MG/DL (ref 65–100)
GLUCOSE SERPL-MCNC: 136 MG/DL (ref 65–100)
HCT VFR BLD AUTO: 35.5 % (ref 35.8–46.3)
HGB BLD-MCNC: 12.4 G/DL (ref 11.7–15.4)
IMM GRANULOCYTES # BLD AUTO: 0.1 K/UL (ref 0–0.5)
IMM GRANULOCYTES NFR BLD AUTO: 1 % (ref 0–5)
LYMPHOCYTES # BLD: 1.2 K/UL (ref 0.5–4.6)
LYMPHOCYTES NFR BLD: 12 % (ref 13–44)
MAGNESIUM SERPL-MCNC: 1.8 MG/DL (ref 1.8–2.4)
MCH RBC QN AUTO: 33.2 PG (ref 26.1–32.9)
MCHC RBC AUTO-ENTMCNC: 34.9 G/DL (ref 31.4–35)
MCV RBC AUTO: 94.9 FL (ref 79.6–97.8)
MONOCYTES # BLD: 1 K/UL (ref 0.1–1.3)
MONOCYTES NFR BLD: 10 % (ref 4–12)
NEUTS SEG # BLD: 7.4 K/UL (ref 1.7–8.2)
NEUTS SEG NFR BLD: 76 % (ref 43–78)
NRBC # BLD: 0 K/UL (ref 0–0.2)
PLATELET # BLD AUTO: 318 K/UL (ref 150–450)
PMV BLD AUTO: 9.3 FL (ref 9.4–12.3)
POTASSIUM SERPL-SCNC: 3.1 MMOL/L (ref 3.5–5.1)
RBC # BLD AUTO: 3.74 M/UL (ref 4.05–5.2)
SERVICE CMNT-IMP: ABNORMAL
SODIUM SERPL-SCNC: 140 MMOL/L (ref 136–145)
WBC # BLD AUTO: 9.8 K/UL (ref 4.3–11.1)

## 2021-05-07 PROCEDURE — 93005 ELECTROCARDIOGRAM TRACING: CPT

## 2021-05-07 PROCEDURE — 2709999900 HC NON-CHARGEABLE SUPPLY

## 2021-05-07 PROCEDURE — 74011250637 HC RX REV CODE- 250/637: Performed by: FAMILY MEDICINE

## 2021-05-07 PROCEDURE — C9113 INJ PANTOPRAZOLE SODIUM, VIA: HCPCS | Performed by: INTERNAL MEDICINE

## 2021-05-07 PROCEDURE — 74011000250 HC RX REV CODE- 250: Performed by: INTERNAL MEDICINE

## 2021-05-07 PROCEDURE — 74011250636 HC RX REV CODE- 250/636: Performed by: INTERNAL MEDICINE

## 2021-05-07 PROCEDURE — 99232 SBSQ HOSP IP/OBS MODERATE 35: CPT | Performed by: SURGERY

## 2021-05-07 PROCEDURE — 36415 COLL VENOUS BLD VENIPUNCTURE: CPT

## 2021-05-07 PROCEDURE — 97116 GAIT TRAINING THERAPY: CPT

## 2021-05-07 PROCEDURE — 74011636637 HC RX REV CODE- 636/637: Performed by: INTERNAL MEDICINE

## 2021-05-07 PROCEDURE — 82962 GLUCOSE BLOOD TEST: CPT

## 2021-05-07 PROCEDURE — 83735 ASSAY OF MAGNESIUM: CPT

## 2021-05-07 PROCEDURE — 97110 THERAPEUTIC EXERCISES: CPT

## 2021-05-07 PROCEDURE — 74011250636 HC RX REV CODE- 250/636: Performed by: FAMILY MEDICINE

## 2021-05-07 PROCEDURE — 97530 THERAPEUTIC ACTIVITIES: CPT

## 2021-05-07 PROCEDURE — 80048 BASIC METABOLIC PNL TOTAL CA: CPT

## 2021-05-07 PROCEDURE — 74011250636 HC RX REV CODE- 250/636: Performed by: HOSPITALIST

## 2021-05-07 PROCEDURE — 85025 COMPLETE CBC W/AUTO DIFF WBC: CPT

## 2021-05-07 PROCEDURE — 65660000000 HC RM CCU STEPDOWN

## 2021-05-07 RX ORDER — MAGNESIUM SULFATE HEPTAHYDRATE 40 MG/ML
2 INJECTION, SOLUTION INTRAVENOUS ONCE
Status: COMPLETED | OUTPATIENT
Start: 2021-05-07 | End: 2021-05-07

## 2021-05-07 RX ORDER — POTASSIUM CHLORIDE 20 MEQ/1
40 TABLET, EXTENDED RELEASE ORAL EVERY 4 HOURS
Status: COMPLETED | OUTPATIENT
Start: 2021-05-07 | End: 2021-05-07

## 2021-05-07 RX ORDER — LABETALOL HYDROCHLORIDE 5 MG/ML
20 INJECTION, SOLUTION INTRAVENOUS
Status: DISCONTINUED | OUTPATIENT
Start: 2021-05-07 | End: 2021-05-08 | Stop reason: HOSPADM

## 2021-05-07 RX ADMIN — Medication 10 ML: at 17:24

## 2021-05-07 RX ADMIN — HYDRALAZINE HYDROCHLORIDE 10 MG: 20 INJECTION INTRAMUSCULAR; INTRAVENOUS at 11:21

## 2021-05-07 RX ADMIN — HYDRALAZINE HYDROCHLORIDE 50 MG: 50 TABLET, FILM COATED ORAL at 08:29

## 2021-05-07 RX ADMIN — ENOXAPARIN SODIUM 30 MG: 100 INJECTION SUBCUTANEOUS at 08:30

## 2021-05-07 RX ADMIN — ROSUVASTATIN 20 MG: 20 TABLET, FILM COATED ORAL at 20:47

## 2021-05-07 RX ADMIN — ACETAMINOPHEN 650 MG: 325 TABLET, FILM COATED ORAL at 11:23

## 2021-05-07 RX ADMIN — PANTOPRAZOLE SODIUM 40 MG: 40 INJECTION, POWDER, FOR SOLUTION INTRAVENOUS at 08:30

## 2021-05-07 RX ADMIN — POTASSIUM CHLORIDE 40 MEQ: 1500 TABLET, EXTENDED RELEASE ORAL at 08:31

## 2021-05-07 RX ADMIN — Medication 10 ML: at 06:00

## 2021-05-07 RX ADMIN — INSULIN LISPRO 2 UNITS: 100 INJECTION, SOLUTION INTRAVENOUS; SUBCUTANEOUS at 08:30

## 2021-05-07 RX ADMIN — MAGNESIUM SULFATE HEPTAHYDRATE 2 G: 40 INJECTION, SOLUTION INTRAVENOUS at 17:24

## 2021-05-07 RX ADMIN — POTASSIUM CHLORIDE 40 MEQ: 1500 TABLET, EXTENDED RELEASE ORAL at 11:21

## 2021-05-07 RX ADMIN — HYDRALAZINE HYDROCHLORIDE 50 MG: 50 TABLET, FILM COATED ORAL at 20:47

## 2021-05-07 RX ADMIN — HYDRALAZINE HYDROCHLORIDE 50 MG: 50 TABLET, FILM COATED ORAL at 17:24

## 2021-05-07 RX ADMIN — BISACODYL 10 MG: 10 SUPPOSITORY RECTAL at 08:29

## 2021-05-07 NOTE — PROGRESS NOTES
Problem: Self Care Deficits Care Plan (Adult)  Goal: *Acute Goals and Plan of Care (Insert Text)  Description: 1. Patient will perform grooming with supervision standing at sink. 2. Patient will perform Upper body dressing with supervision  3. Patient will perform lower body dressing with supervision PROGRESSING 5/3/21  4. Patient will perform upper and lower body bathing with supervision. 5. Patient will perform toilet transfers with CGA. 6. Patient will perform shower transfer with CGA. 7. Patient will participate in 30 + minutes of ADL/ therapeutic exercise/therapeutic activity with min rest breaks to increase activity tolerance for self care. PROGRESSING 5/7/21  8. Patient will perform ADL functional mobility in room with CGA. PROGRESSING 5/7/21    Goals to be achieved in 7 days. Outcome: Progressing Towards Goal     OCCUPATIONAL THERAPY: Daily Note and PM 5/7/2021  INPATIENT: OT Visit Days: 3  [place] / [place] / Product Type: Medicare /      NAME/AGE/GENDER: Selin Calero is a 68 y.o. female   PRIMARY DIAGNOSIS:  Acute gastroenteritis [K52.9] SBO (small bowel obstruction) ([facility]) SBO (small bowel obstruction) ([facility]     ICD-10: Treatment Diagnosis:    · Generalized Muscle Weakness (M62.81)  · Other lack of cordination (R27.8)   Precautions/Allergies:     Lortab [hydrocodone-acetaminophen] and Acetaminophen      ASSESSMENT:     Ms. Daryl Jacobson presents supine with  present. Pt able to sup>sit with CGA and don underwear and socks at EOB with min A. Pt performed sit<>stand and functional mobility in room and hallway with CGA and HHA. Pt began to fatigue toward the end of session and is currently functioning below her baseline of being active and walking about 3 miles per day. Pt reports getting up and going to the bathroom earlier this AM. Pt left up in recliner with all needs met and in reach. Continue per OT POC.    5/7/2021  Pt seen in room with PT and  present. Pt supine in bed and able to get to sitting with SBA. Pt up and ambulated household distance with minimal assistance. Pt weak but could go home with family support when medically stable       This section established at most recent assessment   PROBLEM LIST (Impairments causing functional limitations):  1. Decreased Strength  2. Decreased ADL/Functional Activities  3. Decreased Ambulation Ability/Technique  4. Decreased Balance  5. Increased Pain  6. Decreased Activity Tolerance   INTERVENTIONS PLANNED: (Benefits and precautions of occupational therapy have been discussed with the patient.)  1. Activities of daily living training  2. Adaptive equipment training  3. Balance training  4. Therapeutic activity  5. Therapeutic exercise     TREATMENT PLAN: Frequency/Duration: Follow patient 3 times per week to address above goals. Rehabilitation Potential For Stated Goals: Good     REHAB RECOMMENDATIONS (at time of discharge pending progress):    Placement: It is my opinion, based on this patient's performance to date, that Ms. Dhaval Carcamo may benefit from participating in 1-2 additional therapy sessions in order to continue to assess for rehab potential and then make recommendation for disposition at discharge. Equipment:    None at this time              OCCUPATIONAL PROFILE AND HISTORY:   History of Present Injury/Illness (Reason for Referral):  See H&P  Past Medical History/Comorbidities:   Ms. Dhaval Carcamo  has a past medical history of DDD (degenerative disc disease), lumbar (2/19/2020), DM2 (diabetes mellitus, type 2) ([address] (3/4/2014), Gastroesophageal reflux disease without esophagitis (5/22/2015), HLD (hyperlipidemia), HTN (hypertension), Menopause, Sciatica of right side (2/19/2020), and UTI (urinary tract infection) (4/27/2021).  She also has no past medical history of Asthma, Autoimmune disease ([address] CAD (coronary artery disease), Cancer ([address] Chronic kidney disease, COPD, Dementia, Heart failure ([address] Liver disease, Other ill-defined conditions(799.89), Psychiatric disorder, PUD (peptic ulcer disease), Seizures ([facility] Sleep disorder, Stroke ([facility] or Thromboembolus ([facility] Ms. Anand Hook  has a past surgical history that includes hx hysterectomy; hx partial thyroidectomy; hx other surgical; hx colonoscopy (01/25/2010); and neurological procedure unlisted (2014). Social History/Living Environment:   Home Environment: Private residence  One/Two Story Residence: Two story  Living Alone: No  Support Systems: Spouse/Significant Other/Partner  Patient Expects to be Discharged to[de-identified] Private residence  Current DME Used/Available at Home: None  Prior Level of Function/Work/Activity:  Independent      Number of Personal Factors/Comorbidities that affect the Plan of Care: Brief history (0):  LOW COMPLEXITY   ASSESSMENT OF OCCUPATIONAL PERFORMANCE[de-identified]   Activities of Daily Living:   Basic ADLs (From Assessment) Complex ADLs (From Assessment)   Feeding: Independent  Oral Facial Hygiene/Grooming: Setup, Contact guard assistance  Bathing: Minimum assistance  Upper Body Dressing: Minimum assistance  Lower Body Dressing: Minimum assistance  Toileting: Minimum assistance     Grooming/Bathing/Dressing Activities of Daily Living                             Bed/Mat Mobility  Supine to Sit: Stand-by assistance  Sit to Supine: Stand-by assistance  Sit to Stand: Contact guard assistance  Stand to Sit: Stand-by assistance  Bed to Chair: Contact guard assistance  Scooting: Stand-by assistance     Most Recent Physical Functioning:   Gross Assessment:                  Posture:  Posture (WDL): Within defined limits  Balance:  Sitting: Intact  Standing: Pull to stand; Without support Bed Mobility:  Supine to Sit: Stand-by assistance  Sit to Supine: Stand-by assistance  Scooting: Stand-by assistance  Wheelchair Mobility:     Transfers:  Sit to Stand: Contact guard assistance  Stand to Sit: Stand-by assistance  Bed to Chair: Contact guard assistance Patient Vitals for the past 6 hrs:   BP BP Patient Position SpO2 Pulse   21 1543 (!) 163/56 Supine 93 % (!) 105       Mental Status  Neurologic State: Alert, Appropriate for age  Orientation Level: Oriented X4  Cognition: Appropriate for age attention/concentration  Perception: Appears intact  Perseveration: No perseveration noted  Safety/Judgement: Awareness of environment                          Physical Skills Involved:  1. Range of Motion  2. Balance  3. Strength Cognitive Skills Affected (resulting in the inability to perform in a timely and safe manner): 1. none Psychosocial Skills Affected:  1. Environmental Adaptation   Number of elements that affect the Plan of Care: 3-5:  MODERATE COMPLEXITY   CLINICAL DECISION MAKIN[address] [place] [address] AM-PAC 6 Clicks   Daily Activity Inpatient Short Form  How much help from another person does the patient currently need. .. Total A Lot A Little None   1. Putting on and taking off regular lower body clothing? [] 1   [] 2   [x] 3   [] 4   2. Bathing (including washing, rinsing, drying)? [] 1   [] 2   [x] 3   [] 4   3. Toileting, which includes using toilet, bedpan or urinal?   [] 1   [] 2   [x] 3   [] 4   4. Putting on and taking off regular upper body clothing? [] 1   [] 2   [x] 3   [] 4   5. Taking care of personal grooming such as brushing teeth? [] 1   [] 2   [x] 3   [] 4   6. Eating meals? [] 1   [] 2   [] 3   [x] 4   © , Trustees of [address] [place] [id number], under license to [employer]. All rights reserved      Score:  Initial: 19 Most Recent: 19 (Date: 2021)    Interpretation of Tool:  Represents activities that are increasingly more difficult (i.e. Bed mobility, Transfers, Gait).        Use of outcome tool(s) and clinical judgement create a POC that gives a: LOW COMPLEXITY         TREATMENT:   (In addition to Assessment/Re-Assessment sessions the following treatments were rendered)     Pre-treatment Symptoms/Complaints:    Pain: Initial:     0 Post Session:  0       Therapeutic Activity (  15 Minutes): Therapeutic activities per grid below to improve mobility, strength and balance. Required minimal verbal and manual cues to promote functional mobility and safety. Braces/Orthotics/Lines/Etc:   · IV  · O2 Device: None (Room air)  Treatment/Session Assessment:    · Response to Treatment:  pt up in room and to bathroom tolerated well  · Interdisciplinary Collaboration:   o Physical Therapist  o Occupational Therapist  o Registered Nurse  · After treatment position/precautions:   o Supine in bed  o Bed/Chair-wheels locked  o Bed in low position  o Call light within reach  o RN notified  o Family at bedside   · Compliance with Program/Exercises: Compliant all of the time, Will assess as treatment progresses. · Recommendations/Intent for next treatment session: \"Next visit will focus on advancements to more challenging activities and reduction in assistance provided\".   Total Treatment Duration:  OT Patient Time In/Time Out  Time In: 1515  Time Out: Debbie Belcher

## 2021-05-07 NOTE — PROGRESS NOTES
Problem: Mobility Impaired (Adult and Pediatric)  Goal: *Acute Goals and Plan of Care (Insert Text)  Description: STG:  (1.)Ms. Edna Owen will move from supine to sit and sit to supine  with STAND BY ASSIST within 4-7 treatment day(s). Progressing 5/2  (2.)Ms. Edna Owen will transfer from bed to chair and chair to bed with STAND BY ASSIST using the least restrictive device within 4-7 treatment day(s). Goal met  (3.)Ms. Edna Owen will ambulate with STAND BY ASSIST for 400 feet with the least restrictive device within 4-7 treatment day(s). ________________________________________________________________________________________________      Outcome: Progressing Towards Goal     PHYSICAL THERAPY: Daily Note and AM 5/7/2021  INPATIENT: PT Visit Days : 7  [place] / [place] / Product Type: Medicare /       NAME/AGE/GENDER: Meredith Quinones is a 68 y.o. female   PRIMARY DIAGNOSIS: Acute gastroenteritis [K52.9] SBO (small bowel obstruction) ([facility]) [facility] ([facility]       ICD-10: Treatment Diagnosis:    · Generalized Muscle Weakness (M62.81)  · Difficulty in walking, Not elsewhere classified (R26.2)   Precaution/Allergies:  Lortab [hydrocodone-acetaminophen] and Acetaminophen      ASSESSMENT:     Ms. Edna Owen agreeable to walking with therapy. NG tube clamped. Complaint of feeling weak-hasn't really eaten in days. Ambulated in hallway with IV pole and min A. Expect her to progress as she is able to eat and get some energy. Continue to recommend [place] at d/c.  5/5 supine upon arrival.  All bed mobility SBA. Supine>EOB with SBA-CGA. Stood for few min then walk 130 ft down the kaba pt pushing IV and HHA with therapist.  Return to supine with SBA and performs B LE exercises. Left in supine with needs in reach. 5/7 - supine on contact and agreeable to therapy. Says she doesn't feel well but participated well.  She progressed with gait distance this afternoon and plans on hopefully going home tomorrow with HHPT. She ambulated in the halls and then returned to do therex supine.  at bedside. This section established at most recent assessment   PROBLEM LIST (Impairments causing functional limitations):  1. Decreased Strength  2. Decreased ADL/Functional Activities  3. Decreased Transfer Abilities  4. Decreased Ambulation Ability/Technique  5. Decreased Balance  6. Decreased Activity Tolerance   INTERVENTIONS PLANNED: (Benefits and precautions of physical therapy have been discussed with the patient.)  1. Balance Exercise  2. Bed Mobility  3. Gait Training  4. Therapeutic Activites  5. Therapeutic Exercise/Strengthening  6. Transfer Training     TREATMENT PLAN: Frequency/Duration: daily for duration of hospital stay  Rehabilitation Potential For Stated Goals: Good     REHAB RECOMMENDATIONS (at time of discharge pending progress):    Placement: It is my opinion, based on this patient's performance to date, that Ms. Anaya Hull may benefit from [address] after discharge due to the functional deficits listed above that are likely to improve with skilled rehabilitation because he/she has multiple medical issues that affect his/her functional mobility in the community. Equipment:    To be determined, may need a rolling walker              HISTORY:   History of Present Injury/Illness (Reason for Referral):  PER MD NOTE: Jose Alfredo Cobb is a 68 y.o. female, with a history of  has a past medical history of DDD (degenerative disc disease), lumbar (2/19/2020), DM2 (diabetes mellitus, type 2) ([address] (3/4/2014), Gastroesophageal reflux disease without esophagitis (5/22/2015), HLD (hyperlipidemia), HTN (hypertension), Menopause, Sciatica of right side (2/19/2020), and UTI (urinary tract infection) (4/27/2021).  She also has no past medical history of Asthma, Autoimmune disease ([address] CAD (coronary artery disease), Cancer ([address] Chronic kidney disease, COPD, Dementia, Heart failure ([address] Liver disease, Other ill-defined conditions(799.89), Psychiatric disorder, PUD (peptic ulcer disease), Seizures ([address] Sleep disorder, Stroke ([address] or Thromboembolus ([address] ,  has a past surgical history that includes hx hysterectomy; hx partial thyroidectomy; hx other surgical; hx colonoscopy (01/25/2010); and neurological procedure unlisted (2014). who presents to the ER with report of two days of persistent lower abdominal pain, nausea, and vomiting. She was seen in the ER yesterday and was discharged home with diagnosis of UTI after labs and CT abd/pelvis were unremarkable for admitting diagnosis. Pt reports persistent nausea and vomiting since that time and was unable to keep down Keflex that was prescribed for the UTI. Denies any fevers, chills, diarrhea. Past Medical History/Comorbidities:   Ms. Kamala Kaur  has a past medical history of DDD (degenerative disc disease), lumbar (2/19/2020), DM2 (diabetes mellitus, type 2) ([address] (3/4/2014), Gastroesophageal reflux disease without esophagitis (5/22/2015), HLD (hyperlipidemia), HTN (hypertension), Menopause, Sciatica of right side (2/19/2020), and UTI (urinary tract infection) (4/27/2021). She also has no past medical history of Asthma, Autoimmune disease ([address] CAD (coronary artery disease), Cancer ([address] Chronic kidney disease, COPD, Dementia, Heart failure ([address] Liver disease, Other ill-defined conditions(799.89), Psychiatric disorder, PUD (peptic ulcer disease), Seizures ([address] Sleep disorder, Stroke ([address] or Thromboembolus ([address] Ms. Kamala Kaur  has a past surgical history that includes hx hysterectomy; hx partial thyroidectomy; hx other surgical; hx colonoscopy (01/25/2010); and neurological procedure unlisted (2014).   Social History/Living Environment:   Home Environment: Private residence  One/Two Story Residence: Two story  Living Alone: No  Support Systems: Spouse/Significant Other/Partner  Patient Expects to be Discharged to[de-identified] Private residence  Current DME Used/Available at Home: None  Prior Level of Function/Work/Activity:  Pt lives at home with spouse, independent with mobility without assistive devices     Number of Personal Factors/Comorbidities that affect the Plan of Care: 0: LOW COMPLEXITY   EXAMINATION:   Most Recent Physical Functioning:   Gross Assessment:                       Balance:  Sitting: Intact  Standing: Pull to stand; Without support Bed Mobility:  Supine to Sit: Stand-by assistance  Sit to Supine: Stand-by assistance  Scooting: Stand-by assistance       Transfers:  Sit to Stand: Contact guard assistance  Stand to Sit: Stand-by assistance  Bed to Chair: Contact guard assistance  Gait:     Speed/Sunshine: Pace decreased (<100 feet/min)  Step Length: Right shortened;Left shortened  Gait Abnormalities: Decreased step clearance  Distance (ft): 180 Feet (ft)  Assistive Device: (none)  Ambulation - Level of Assistance: Contact guard assistance  Interventions: Safety awareness training;Verbal cues  Duration: 10 Minutes      Body Structures Involved:  1. Muscles Body Functions Affected:  1. Movement Related Activities and Participation Affected:  1. Mobility  2. Self Care   Number of elements that affect the Plan of Care: 4+: HIGH COMPLEXITY   CLINICAL PRESENTATION:   Presentation: Stable and uncomplicated: LOW COMPLEXITY   CLINICAL DECISION MAKIN[address] [place] [address] AM-PAC 6 Clicks   Basic Mobility Inpatient Short Form  How much difficulty does the patient currently have. .. Unable A Lot A Little None   1. Turning over in bed (including adjusting bedclothes, sheets and blankets)? [] 1   [] 2   [x] 3   [] 4   2. Sitting down on and standing up from a chair with arms ( e.g., wheelchair, bedside commode, etc.)   [] 1   [] 2   [x] 3   [] 4   3. Moving from lying on back to sitting on the side of the bed? [] 1   [] 2   [x] 3   [] 4   How much help from another person does the patient currently need. .. Total A Lot A Little None   4.   Moving to and from a bed to a chair (including a wheelchair)? [] 1   [] 2   [x] 3   [] 4   5. Need to walk in hospital room? [] 1   [] 2   [x] 3   [] 4   6. Climbing 3-5 steps with a railing? [] 1   [] 2   [x] 3   [] 4   © 2007, Trustees of [address] [place] [address] [address] [id number], under license to [employer]. All rights reserved      Score:  Initial: 18 Most Recent: X (Date: -- )    Interpretation of Tool:  Represents activities that are increasingly more difficult (i.e. Bed mobility, Transfers, Gait). Medical Necessity:     · Patient is expected to demonstrate progress in   · strength and functional technique  ·  to   · decrease assistance required with functional mobility  · . Reason for Services/Other Comments:  · Patient continues to require skilled intervention due to   · Inability to complete functional mobility independently  · . Use of outcome tool(s) and clinical judgement create a POC that gives a: Clear prediction of patient's progress: LOW COMPLEXITY            TREATMENT:   (In addition to Assessment/Re-Assessment sessions the following treatments were rendered)   Pre-treatment Symptoms/Complaints: Patient agreeable  Pain: Initial: numeric scale     Post Session:     Gait Training (10 Minutes):  Gait training to improve and/or restore physical functioning as related to mobility and strength. Ambulated 180 Feet (ft) with Contact guard assistance using a (none) and minimal Safety awareness training;Verbal cues related to their stance phase to promote proper body alignment. Therapeutic Exercise: (  15 minutes):  Exercises per grid below to improve mobility and strength. Required minimal verbal cues to promote proper body alignment.      Date:  5/5 Date:  5/7 Date:     Activity/Exercise Parameters Parameters Parameters   Ankle pumps 12 15    heelsides 12 15aa    Hip abd/add 12 15aa    Quad sets  15    SAQ  15                    Braces/Orthotics/Lines/Etc:   · IV  Treatment/Session Assessment:    · Response to Treatment: Participated well, increase distance, just tired past therapy  · Interdisciplinary Collaboration:   o Physical Therapist  o Occupational Therapist  o Registered Nurse  · After treatment position/precautions:   o Supine in bed  o Bed/Chair-wheels locked  o Bed in low position  o Caregiver at bedside  o Call light within reach   · Compliance with Program/Exercises: Will assess as treatment progresses  · Recommendations/Intent for next treatment session: \"Next visit will focus on advancements to more challenging activities and reduction in assistance provided\".   Total Treatment Duration:  PT Patient Time In/Time Out  Time In: 1675  Time Out: Daniela 87, PT

## 2021-05-07 NOTE — PROGRESS NOTES
[facility] Hospitalist Progress Note     Name:  Salas Dunn  Age:80 y.o. Sex:female   :  1940    MRN:  [id number]     Admit Date:  2021    Reason for Admission:  Acute gastroenteritis [K52.9]    Hospital Course/Interval history:     Briefly, 70-year-old female with significant past medical history of diabetes mellitus and GERD presented to the hospital with abdominal pain, nausea and vomiting. She was initially diagnosed with UTI and she was treated with ceftriaxone. In addition, her initial CT was concerning for small bowel obstruction which was reviewed by surgeon. Patient was treated for ileus and she had 2 bowel movement. Patient improved but on the night of , patient started having some belly pain and abdominal distention. X-ray on May 1 was consistent with SBO. General surgery consulted. Patient started on NG tube with low intermittent suction on May 1. Patient still has NG tube. General surgery following and as per nurse they recommended medications intermittently. Continue otherwise n.p.o. status.        May 4: As per patient she passed gas one time yesterday and had 1 bowel movement. Intermittent abdominal pain. Overnight while sleeping, NG tube came out. Denies any nausea or vomiting. NG tube in place but currently on hold as per surgeon recommendation per nurse. Rest review of system negative except mentioned above. Subjective (21):    2021  NG tube were DC'd this a.m. .  As per surgery okay to start clears. 2021  C/o abdominal distention. Passing flatus,no bowel movement yet    2021  Daughter at bedside  Patient had a few bowel movement since last night. Says feels better but not completely back to normal.  Decreased potassium. Able to tolerate full liquid diet    Later on today with elevated blood pressure she did get a dose of IV hydralazine.     Patient not being discharged today secondary patient not feeling good, decreased potassium/low normal magnesium and requiring hydralazine as needed for elevated blood pressure. Review of Systems: 14 point review of systems is otherwise negative with the exception of the elements mentioned above. Assessment & Plan     -Small bowel obstruction-off NG tube 5/5/2021-started on clears-surgery following. 5/6/2021- c/o abdominal distention- ordered repeat kub- improving distention and bowel wall thickening  5/7/2021-patient was able to tolerate full liquid diet, will advance to GI soft diet this afternoon. Surgery signed off.  -Uncontrolled hypertension-on p.o. hydralazine. Did get a as needed hydralazine this afternoon for elevated blood pressure.  -Hypokalemia and low normal magnesium-will replace potassium and magnesium  -E. coli UTI-finished course of Rocephin  -Diabetes mellitus type 2-continue sliding scale      Diet:  DIET NUTRITIONAL SUPPLEMENTS  DIET GI SOFT  DVT PPx:lovenox  Code status: Full Code  Disposition/Expected LOS-probable discharge tomorrow. Objective:     Patient Vitals for the past 24 hrs:   Temp Pulse Resp BP SpO2   05/07/21 1543 98.3 °F (36.8 °C) (!) 105 20 (!) 163/56 93 %   05/07/21 1115 98.3 °F (36.8 °C) 92 20 (!) 180/60 94 %   05/07/21 1050  88  (!) 175/62 93 %   05/07/21 0751 98.1 °F (36.7 °C) 100 20 (!) 167/92 93 %   05/07/21 0237 98.5 °F (36.9 °C) 98 18 (!) 148/59 93 %   05/06/21 2237 98.1 °F (36.7 °C) 88 18 (!) 145/67 95 %   05/06/21 1927 98.1 °F (36.7 °C) 99 18 (!) 164/53 92 %     Oxygen Therapy  O2 Sat (%): 93 % (05/07/21 1543)  Pulse via Oximetry: 74 beats per minute (04/27/21 2026)  O2 Device: None (Room air) (05/07/21 1050)    Body mass index is 23 kg/m².     Physical Exam:   General:  No acute distress, speaking in full sentences, no use of accessory muscles   HEENT-pupils equal normal reactive to light neck supple throat normal  History of brain surgery, chronic left facial droop  Lungs:  Clear to auscultation bilaterally   CV:  Regular rate and rhythm with normal S1 and S2   Abdomen:  Improved abdominal distention, normal bowel sounds, nontender  Extremities:  No cyanosis clubbing or edema   Neuro:  Nonfocal, A&O x3   Psych:  Normal affect     Data Review:  I have reviewed all labs, meds, and studies from the last 24 hours:    Labs:    Recent Results (from the past 24 hour(s))   GLUCOSE, POC    Collection Time: 05/06/21  8:53 PM   Result Value Ref Range    Glucose (POC) 111 (H) 65 - 100 mg/dL    Performed by Shweta    CBC WITH AUTOMATED DIFF    Collection Time: 05/07/21  5:28 AM   Result Value Ref Range    WBC 9.8 4.3 - 11.1 K/uL    RBC 3.74 (L) 4.05 - 5.2 M/uL    HGB 12.4 11.7 - 15.4 g/dL    HCT 35.5 (L) 35.8 - 46.3 %    MCV 94.9 79.6 - 97.8 FL    MCH 33.2 (H) 26.1 - 32.9 PG    MCHC 34.9 31.4 - 35.0 g/dL    RDW 12.4 11.9 - 14.6 %    PLATELET 351 011 - 920 K/uL    MPV 9.3 (L) 9.4 - 12.3 FL    ABSOLUTE NRBC 0.00 0.0 - 0.2 K/uL    DF AUTOMATED      NEUTROPHILS 76 43 - 78 %    LYMPHOCYTES 12 (L) 13 - 44 %    MONOCYTES 10 4.0 - 12.0 %    EOSINOPHILS 2 0.5 - 7.8 %    BASOPHILS 0 0.0 - 2.0 %    IMMATURE GRANULOCYTES 1 0.0 - 5.0 %    ABS. NEUTROPHILS 7.4 1.7 - 8.2 K/UL    ABS. LYMPHOCYTES 1.2 0.5 - 4.6 K/UL    ABS. MONOCYTES 1.0 0.1 - 1.3 K/UL    ABS. EOSINOPHILS 0.2 0.0 - 0.8 K/UL    ABS. BASOPHILS 0.0 0.0 - 0.2 K/UL    ABS. IMM.  GRANS. 0.1 0.0 - 0.5 K/UL   METABOLIC PANEL, BASIC    Collection Time: 05/07/21  5:28 AM   Result Value Ref Range    Sodium 140 136 - 145 mmol/L    Potassium 3.1 (L) 3.5 - 5.1 mmol/L    Chloride 104 98 - 107 mmol/L    CO2 28 21 - 32 mmol/L    Anion gap 8 7 - 16 mmol/L    Glucose 136 (H) 65 - 100 mg/dL    BUN 3 (L) 8 - 23 MG/DL    Creatinine 0.31 (L) 0.6 - 1.0 MG/DL    GFR est AA >60 >60 ml/min/1.73m2    GFR est non-AA >60 >60 ml/min/1.73m2    Calcium 8.5 8.3 - 10.4 MG/DL   MAGNESIUM    Collection Time: 05/07/21  5:28 AM   Result Value Ref Range    Magnesium 1.8 1.8 - 2.4 mg/dL   GLUCOSE, POC    Collection Time: 05/07/21 8:18 AM   Result Value Ref Range    Glucose (POC) 150 (H) 65 - 100 mg/dL    [facility] by [facility]    GLUCOSE, POC    Collection Time: 05/07/21 11:28 AM   Result Value Ref Range    Glucose (POC) 138 (H) 65 - 100 mg/dL    Performed by [facility]    GLUCOSE, POC    Collection Time: 05/07/21  3:45 PM   Result Value Ref Range    Glucose (POC) 144 (H) 65 - 100 mg/dL    [facility]formed by [facility]        All Micro Results     Procedure Component Value Units Date/Time    CULTURE, BLOOD [[id number]] Collected: 04/27/21 2036    Order Status: Completed Specimen: Blood Updated: 05/02/21 0742     Special Requests: --        RIGHT  Antecubital       Culture result: NO GROWTH 5 DAYS       CULTURE, BLOOD [[id number]] Collected: 04/27/21 1854    Order Status: Completed Specimen: Blood Updated: 05/02/21 0742     Special Requests: --        NO SPECIAL REQUESTS  LEFT  HAND       Culture result: NO GROWTH 5 DAYS             EKG Results     None          Other Studies:  No results found.     Current Meds:   Current Facility-Administered Medications   Medication Dose Route Frequency    magnesium sulfate 2 g/50 ml IVPB (premix or compounded)  2 g IntraVENous ONCE    hydrALAZINE (APRESOLINE) tablet 50 mg  50 mg Oral TID    bisacodyL (DULCOLAX) suppository 10 mg  10 mg Rectal DAILY    hydrALAZINE (APRESOLINE) 20 mg/mL injection 10 mg  10 mg IntraVENous Q6H PRN    pantoprazole (PROTONIX) 40 mg in 0.9% sodium chloride 10 mL injection  40 mg IntraVENous DAILY    insulin lispro (HUMALOG) injection   SubCUTAneous TIDAC    lactated Ringers infusion  75 mL/hr IntraVENous CONTINUOUS    phenol throat spray (CHLORASEPTIC) 1 Spray  1 Spray Oral PRN    enoxaparin (LOVENOX) injection 30 mg  30 mg SubCUTAneous DAILY    diphenhydrAMINE (BENADRYL) injection 12.5 mg  12.5 mg IntraVENous Q6H PRN    [Held by provider] metFORMIN (GLUCOPHAGE) tablet 1,000 mg  1,000 mg Oral BID WITH MEALS    rosuvastatin (CRESTOR) tablet 20 mg  20 mg Oral QHS    sodium chloride (NS) flush 5-40 mL  5-40 mL IntraVENous Q8H    sodium chloride (NS) flush 5-40 mL  5-40 mL IntraVENous PRN    acetaminophen (TYLENOL) tablet 650 mg  650 mg Oral Q6H PRN    Or    acetaminophen (TYLENOL) suppository 650 mg  650 mg Rectal Q6H PRN    polyethylene glycol (MIRALAX) packet 17 g  17 g Oral DAILY PRN    promethazine (PHENERGAN) tablet 12.5 mg  12.5 mg Oral Q6H PRN    Or    ondansetron (ZOFRAN) injection 4 mg  4 mg IntraVENous Q6H PRN       Problem List:  Hospital Problems as of 5/7/2021 Date Reviewed: 4/21/2021          Codes Class Noted - Resolved POA    Hypokalemia ICD-10-CM: E87.6  ICD-9-CM: 276.8  5/5/2021 - Present Unknown        Hypomagnesemia ICD-10-CM: E83.42  ICD-9-CM: 275.2  5/1/2021 - Present No        * (Principal) SBO (small bowel obstruction) ([facility] ICD-10-CM: M11.052  ICD-9-CM: 560.9  4/27/2021 - Present Yes        Leukocytosis ICD-10-CM: Q95.012  ICD-9-CM: 288.60  4/27/2021 - Present Yes        UTI (urinary tract infection) ICD-10-CM: N39.0  ICD-9-CM: 599.0  4/27/2021 - Present Yes        Gastroesophageal reflux disease without esophagitis ICD-10-CM: K21.9  ICD-9-CM: 530.81  6/7/2017 - Present Yes        Type 2 diabetes mellitus without complication, without long-term current use of insulin ([facility] ICD-10-CM: E11.9  ICD-9-CM: 250.00  11/29/2016 - Present Yes        Mixed hyperlipidemia ICD-10-CM: E78.2  ICD-9-CM: 272.2  11/29/2016 - Present Yes        Essential hypertension ICD-10-CM: I10  ICD-9-CM: 401.9  2/23/2016 - Present Yes                   Signed By: Hussein Esquivel MD   [employer] [facility]    May 7, 2021

## 2021-05-07 NOTE — PROGRESS NOTES
Spoke to patient and  at the bedside. Patient says she does not have any nausea no abdominal pain, able to tolerate GI soft diet. Says has this feeling off head heaviness and dizzy feeling whenever she takes a blood pressure pill. Says it would only last for few minutes and resolve.  at the bedside feels like it would last for few hours. Reviewed all the blood pressure readings since this morning. Patient did get a dose of hydralazine but the blood pressure only dropped from 180 to 163 mmHg. Patient presently does not have any headache or feeling of heaviness of the head, does not have any dizzy feeling. Did take her hydralazine p.o. Explained to family that if she has the same kind of feeling, will discontinue hydralazine and start patient on other p.o. medication. For now we will also discontinue IV hydralazine and change to IV labetalol for systolic blood pressure 691 mmHg or more. We will place the patient on telemetry.

## 2021-05-08 VITALS
SYSTOLIC BLOOD PRESSURE: 166 MMHG | WEIGHT: 134 LBS | HEIGHT: 64 IN | RESPIRATION RATE: 20 BRPM | DIASTOLIC BLOOD PRESSURE: 61 MMHG | BODY MASS INDEX: 22.88 KG/M2 | HEART RATE: 95 BPM | TEMPERATURE: 98.1 F | OXYGEN SATURATION: 94 %

## 2021-05-08 LAB
ANION GAP SERPL CALC-SCNC: 7 MMOL/L (ref 7–16)
BUN SERPL-MCNC: 5 MG/DL (ref 8–23)
CALCIUM SERPL-MCNC: 8.2 MG/DL (ref 8.3–10.4)
CHLORIDE SERPL-SCNC: 105 MMOL/L (ref 98–107)
CO2 SERPL-SCNC: 27 MMOL/L (ref 21–32)
CREAT SERPL-MCNC: 0.29 MG/DL (ref 0.6–1)
GLUCOSE BLD STRIP.AUTO-MCNC: 124 MG/DL (ref 65–100)
GLUCOSE SERPL-MCNC: 132 MG/DL (ref 65–100)
MAGNESIUM SERPL-MCNC: 2 MG/DL (ref 1.8–2.4)
POTASSIUM SERPL-SCNC: 3.5 MMOL/L (ref 3.5–5.1)
SERVICE CMNT-IMP: ABNORMAL
SODIUM SERPL-SCNC: 139 MMOL/L (ref 136–145)
TSH SERPL DL<=0.005 MIU/L-ACNC: 3.46 UIU/ML

## 2021-05-08 PROCEDURE — 97535 SELF CARE MNGMENT TRAINING: CPT

## 2021-05-08 PROCEDURE — 83735 ASSAY OF MAGNESIUM: CPT

## 2021-05-08 PROCEDURE — 80048 BASIC METABOLIC PNL TOTAL CA: CPT

## 2021-05-08 PROCEDURE — 36415 COLL VENOUS BLD VENIPUNCTURE: CPT

## 2021-05-08 PROCEDURE — 74011250636 HC RX REV CODE- 250/636: Performed by: INTERNAL MEDICINE

## 2021-05-08 PROCEDURE — 74011000250 HC RX REV CODE- 250: Performed by: INTERNAL MEDICINE

## 2021-05-08 PROCEDURE — C9113 INJ PANTOPRAZOLE SODIUM, VIA: HCPCS | Performed by: INTERNAL MEDICINE

## 2021-05-08 PROCEDURE — 74011250637 HC RX REV CODE- 250/637: Performed by: FAMILY MEDICINE

## 2021-05-08 PROCEDURE — 84443 ASSAY THYROID STIM HORMONE: CPT

## 2021-05-08 PROCEDURE — 82962 GLUCOSE BLOOD TEST: CPT

## 2021-05-08 PROCEDURE — 74011250636 HC RX REV CODE- 250/636: Performed by: HOSPITALIST

## 2021-05-08 RX ORDER — POTASSIUM CHLORIDE 20 MEQ/1
40 TABLET, EXTENDED RELEASE ORAL EVERY 4 HOURS
Status: DISCONTINUED | OUTPATIENT
Start: 2021-05-08 | End: 2021-05-08 | Stop reason: HOSPADM

## 2021-05-08 RX ORDER — CLONIDINE HYDROCHLORIDE 0.1 MG/1
0.1 TABLET ORAL
Qty: 15 TAB | Refills: 0 | Status: SHIPPED | OUTPATIENT
Start: 2021-05-08 | End: 2021-05-18 | Stop reason: ALTCHOICE

## 2021-05-08 RX ORDER — POTASSIUM CHLORIDE 20 MEQ/1
20 TABLET, EXTENDED RELEASE ORAL DAILY
Qty: 4 TAB | Refills: 0 | Status: SHIPPED | OUTPATIENT
Start: 2021-05-08 | End: 2021-06-01

## 2021-05-08 RX ORDER — HYDRALAZINE HYDROCHLORIDE 50 MG/1
50 TABLET, FILM COATED ORAL 3 TIMES DAILY
Qty: 90 TAB | Refills: 0 | Status: SHIPPED | OUTPATIENT
Start: 2021-05-08 | End: 2021-05-18 | Stop reason: ALTCHOICE

## 2021-05-08 RX ORDER — FACIAL-BODY WIPES
10 EACH TOPICAL
Qty: 7 SUPPOSITORY | Refills: 0 | Status: SHIPPED | OUTPATIENT
Start: 2021-05-08 | End: 2021-05-18 | Stop reason: ALTCHOICE

## 2021-05-08 RX ADMIN — BISACODYL 10 MG: 10 SUPPOSITORY RECTAL at 08:24

## 2021-05-08 RX ADMIN — SODIUM CHLORIDE, SODIUM LACTATE, POTASSIUM CHLORIDE, AND CALCIUM CHLORIDE 75 ML/HR: 600; 310; 30; 20 INJECTION, SOLUTION INTRAVENOUS at 02:43

## 2021-05-08 RX ADMIN — HYDRALAZINE HYDROCHLORIDE 50 MG: 50 TABLET, FILM COATED ORAL at 08:24

## 2021-05-08 RX ADMIN — ENOXAPARIN SODIUM 30 MG: 100 INJECTION SUBCUTANEOUS at 08:24

## 2021-05-08 RX ADMIN — PANTOPRAZOLE SODIUM 40 MG: 40 INJECTION, POWDER, FOR SOLUTION INTRAVENOUS at 08:24

## 2021-05-08 RX ADMIN — POTASSIUM CHLORIDE 40 MEQ: 1500 TABLET, EXTENDED RELEASE ORAL at 08:24

## 2021-05-08 NOTE — DISCHARGE SUMMARY
Hospitalist Discharge Summary     Admit Date:  2021  5:37 PM   Name:  Mariia Cuellar   Age:  68 y.o.  :  1940   MRN:  [id number]   PCP:  Renetta Wade MD  Treatment Team: Attending Provider: Chelsey White MD; Utilization Review: Sukh Nagel; Care Manager: Rohan Barahona, RN; Care Manager: Dong Lemus, ELIZABETH; Physical Therapist: Power Wray PT; Occupational Therapist: Michele Canseco OT    Problem List for this Hospitalization:  Hospital Problems as of 2021 Date Reviewed: 2021          Codes Class Noted - Resolved POA    Hypokalemia ICD-10-CM: E87.6  ICD-9-CM: 276.8  2021 - Present Unknown        Hypomagnesemia ICD-10-CM: E83.42  ICD-9-CM: 275.2  2021 - Present No        * (Principal) SBO (small bowel obstruction) ([facility] ICD-10-CM: H55.380  ICD-9-CM: 560.9  2021 - Present Yes        Leukocytosis ICD-10-CM: F48.774  ICD-9-CM: 288.60  2021 - Present Yes        UTI (urinary tract infection) ICD-10-CM: N39.0  ICD-9-CM: 599.0  2021 - Present Yes        Gastroesophageal reflux disease without esophagitis ICD-10-CM: K21.9  ICD-9-CM: 530.81  2017 - Present Yes        Type 2 diabetes mellitus without complication, without long-term current use of insulin ([facility] ICD-10-CM: E11.9  ICD-9-CM: 250.00  2016 - Present Yes        Mixed hyperlipidemia ICD-10-CM: E78.2  ICD-9-CM: 272.2  2016 - Present Yes        Essential hypertension ICD-10-CM: I10  ICD-9-CM: 401.9  2016 - Present Yes                Admission HPI from 2021:    Pt presents to the ER with report of two days of persistent lower abdominal pain, nausea, and vomiting. She was seen in the ER yesterday and was discharged home with diagnosis of UTI after labs and CT abd/pelvis were unremarkable for admitting diagnosis. Pt reports persistent nausea and vomiting since that time and was unable to keep down Keflex that was prescribed for the UTI. Denies any fevers, chills, diarrhea. Hospital Course:   pt was initially diagnosed with UTI and she was treated with ceftriaxone.  In addition, her initial CT was concerning for small bowel obstruction which was reviewed by surgeon. Nino Caldwell was treated for ileus and she had 2 bowel movement.  Patient improved but on the night of April 30, patient started having some belly pain and abdominal distention.  X-ray on May 1 was consistent with SBO.  General surgery consulted. Nino Caldwell started on NG tube with low intermittent suction on May 1. Ng tube was dced on 5/5/2021. Pt was started on clears, repeat xr kub was done as pt was c/o abdominal distention, showed improving dilatation and wall thickening. Rectal suppository was added. Pt had bowel movement, improved abdominal distention, able to tolerate gi soft diet. New HTN. Pt was initially on clonidine patch , once able to tolerate diet, clonidine was dced and started on hydralazine. Pt c/o dizziness and head feeling heavy when she took hydralazine iv and thinks also with pills. Pt did c/o any of the dizzy or head heaviness since 5/7/2021 afternoon, tolerating po hydralazine. ekg no acute changes. Telemetry no acute findings. TSH with in normal range. Last blood pressure - systolic 663 mmhg. Advised the staff to update the bp in chart. Hypokalemia and hypomag replaced, normal at discharge. Pt is clinically stable for discharge. Follow up instructions below. Plan was discussed with pt and pts . All questions answered. Patient was stable at time of discharge and was instructed to call or return if there are any concerns or recurrence of symptoms. Diagnostic Imaging/Tests:   Xr Abd (kub)    Result Date: 4/27/2021  Abdominal radiograph History: Pt was seen and treated in the ED on Monday. Returns to the ED for c/o increased discomfort and fever. WBC's elevated from yesterday AP views of the abdomen were obtained in the supine position. Comparison: None.  Correlation is made to the CT scan of the abdomen and pelvis 04/26/2021. Findings: There are scattered mildly distended small bowel loops. Is faint contrast material appears to reside within small bowel loops within the pelvis. Gas is present within nondistended portions of the colon. Assessment for free intraperitoneal air is suboptimal on this supine technique. Mildly dilated small bowel loops concerning for a partial small bowel obstruction. Ct Abd Pelv W Cont    Result Date: 4/28/2021  CT of the Abdomen and Pelvis with contrast CLINICAL INDICATION:  Follow-up of abnormal small bowel with subacute progressive worsening generalized abdominal distention and pain. Spontaneous bacterial peritonitis. COMPARISON: 4/26/2021 TECHNIQUE: Automated exposure Control was used. Multiple axial images were obtained through the abdomen and pelvis after intravenous injection of 125cc of isovue 370 IV contrast to further evaluate vessels and organs. Coronal reformatted images were done for further evaluation of bones and organs. Oral contrast was given for further evaluation of GI tract and adjacent organs. FINDINGS: Partially included lung bases demonstrate a new small posterior layering pleural effusion on the right and mild infiltrates/atelectasis in both posterior lower lungs. No dense consolidation. Nonspecific mild wall thickening of distal esophagus and probable tiny hiatal hernia. Abdomen: Unchanged indeterminate right adrenal nodule, most likely incidental adenoma in the absence of clinical suspicion. The kidneys, adrenals, spleen, liver, pancreas, gallbladder have not changed in 2 days. Right renal cyst again noted. Diffuse atherosclerotic changes again noted of aorta. Contrast does opacify major vessels. No evidence of free air, large volume ascites, or focal fluid collection in the abdomen. There is increased tracer small volume of abdominopelvic ascites. GI tract demonstrates contrast from level of stomach to rectum.  Stomach, duodenum, large bowel, distal ileum are not distended. There is a focal right lower quadrant small bowel loop with wall thickening and surrounding inflammatory stranding. Proximal to this there are multiple mildly dilated small bowel loops which have slightly increased since prior. Nonspecific wall thickening is also noted scattered and several other small bowel loops. Pelvis: No developing abscess, lymphadenopathy or mass. Small volume ascites. Bones: No acute osseous lesions. 1. Abnormal small bowel, most prominently involving focal wall thickening and inflammation in a right lower quadrant loop. 2. Dilatation of bowel loops proximal to this, likely partial small bowel obstruction. Contrast does progress to the level of the rectum. 3. Increased ascites, small volume. 4. Right small pleural effusion. Mild bibasilar lung infiltrates versus atelectasis. Echocardiogram results:  No results found for this visit on 04/27/21.       All Micro Results     Procedure Component Value Units Date/Time    CULTURE, BLOOD [[id number]] Collected: 04/27/21 2036    Order Status: Completed Specimen: Blood Updated: 05/02/21 0742     Special Requests: --        RIGHT  Antecubital       Culture result: NO GROWTH 5 DAYS       CULTURE, BLOOD [[id number]] Collected: 04/27/21 1854    Order Status: Completed Specimen: Blood Updated: 05/02/21 0742     Special Requests: --        NO SPECIAL REQUESTS  LEFT  HAND       Culture result: NO GROWTH 5 DAYS             Labs: Results:       BMP, Mg, Phos Recent Labs     05/08/21  0444 05/07/21  0528 05/06/21  0241    140 138   K 3.5 3.1* 4.1    104 104   CO2 27 28 28   AGAP 7 8 6*   BUN 5* 3* 5*   CREA 0.29* 0.31* 0.33*   CA 8.2* 8.5 8.5   * 136* 129*   MG 2.0 1.8 2.0      CBC Recent Labs     05/07/21  0528 05/06/21  0241   WBC 9.8 8.9   RBC 3.74* 3.80*   HGB 12.4 12.5   HCT 35.5* 35.8    294   GRANS 76 72   LYMPH 12* 14   EOS 2 2   MONOS 10 12   BASOS 0 1   IG 1 1 ANEU 7.4 6.4   ABL 1.2 1.2   TAMMY 0.2 0.2   ABM 1.0 1.0   ABB 0.0 0.0   AIG 0.1 0.0      LFT No results for input(s): ALT, TBIL, AP, TP, ALB, GLOB, AGRAT in the last 72 hours. No lab exists for component: SGOT, GPT   Cardiac Testing Lab Results   Component Value Date/Time    Troponin-I <0.05 12/30/2010 05:07 AM    Troponin-I <0.05 12/30/2010 02:12 AM      Coagulation Tests No results found for: PTP, INR, APTT, INREXT, INREXT   A1c Lab Results   Component Value Date/Time    Hemoglobin A1c 6.4 (H) 04/21/2021 10:34 AM    Hemoglobin A1c 6.7 (H) 10/02/2020 10:11 AM    Hemoglobin A1c 7.7 (H) 06/30/2020 12:00 PM      Lipid Panel Lab Results   Component Value Date/Time    Cholesterol, total 116 04/21/2021 10:34 AM    HDL Cholesterol 63 04/21/2021 10:34 AM    LDL, calculated 39 04/21/2021 10:34 AM    LDL, calculated 34 06/30/2020 12:00 PM    VLDL, calculated 14 04/21/2021 10:34 AM    VLDL, calculated 23 06/30/2020 12:00 PM    Triglyceride 69 04/21/2021 10:34 AM    CHOL/HDL Ratio 4.1 05/26/2016 08:58 AM      Thyroid Panel Lab Results   Component Value Date/Time    TSH 3.460 05/08/2021 04:44 AM    TSH 2.700 04/21/2021 10:34 AM        Most Recent UA Lab Results   Component Value Date/Time    Color Yellow 09/03/2019 08:46 AM    Appearance Clear 09/03/2019 08:46 AM    pH (UA) 5.0 06/07/2017 08:52 AM    Protein Negative 06/07/2017 08:52 AM    Glucose Negative 06/07/2017 08:52 AM    Ketone Negative 06/07/2017 08:52 AM    Bilirubin Negative 06/07/2017 08:52 AM    Blood Negative 06/07/2017 08:52 AM    Urobilinogen 0.2 E.U./dL 06/07/2017 08:52 AM    Nitrites Positive (A) 06/07/2017 08:52 AM    Leukocyte Esterase Trace (A) 06/07/2017 08:52 AM        Allergies   Allergen Reactions    Lortab [Hydrocodone-Acetaminophen] Itching     Pt states she has taken it since and been fine.  Acetaminophen Other (comments)     States she has taken this with no problem.       Immunization History   Administered Date(s) Administered   Larry COVID-19, PFIZER, MRNA, LNP-S, PF, 30MCG/0.3ML DOSE 02/04/2021, 02/25/2021    Influenza High Dose Vaccine PF 08/31/2016, 09/07/2017, 10/10/2018    Influenza Vaccine 09/01/2013, 09/04/2014    Pneumococcal Conjugate (PCV-13) 02/23/2016    Pneumococcal Polysaccharide (PPSV-23) 12/26/2013    Zoster Recombinant 06/17/2019, 10/29/2019, 10/31/2019       All Labs from Last 24 Hrs:  Recent Results (from the past 24 hour(s))   GLUCOSE, POC    Collection Time: 05/07/21  3:45 PM   Result Value Ref Range    Glucose (POC) 144 (H) 65 - 100 mg/dL    Performed by Kevin    GLUCOSE, POC    Collection Time: 05/07/21  8:21 PM   Result Value Ref Range    Glucose (POC) 143 (H) 65 - 100 mg/dL    Performed by Shweta    METABOLIC PANEL, BASIC    Collection Time: 05/08/21  4:44 AM   Result Value Ref Range    Sodium 139 136 - 145 mmol/L    Potassium 3.5 3.5 - 5.1 mmol/L    Chloride 105 98 - 107 mmol/L    CO2 27 21 - 32 mmol/L    Anion gap 7 7 - 16 mmol/L    Glucose 132 (H) 65 - 100 mg/dL    BUN 5 (L) 8 - 23 MG/DL    Creatinine 0.29 (L) 0.6 - 1.0 MG/DL    GFR est AA >60 >60 ml/min/1.73m2    GFR est non-AA >60 >60 ml/min/1.73m2    Calcium 8.2 (L) 8.3 - 10.4 MG/DL   TSH 3RD GENERATION    Collection Time: 05/08/21  4:44 AM   Result Value Ref Range    TSH 3.460 uIU/mL   MAGNESIUM    Collection Time: 05/08/21  4:44 AM   Result Value Ref Range    Magnesium 2.0 1.8 - 2.4 mg/dL   GLUCOSE, POC    Collection Time: 05/08/21  7:29 AM   Result Value Ref Range    Glucose (POC) 124 (H) 65 - 100 mg/dL    Performed by Caity        Discharge Exam:  Patient Vitals for the past 24 hrs:   Temp Pulse Resp BP SpO2   05/08/21 0814 98.6 °F (37 °C) (!) 104 20 (!) 178/79 93 %   05/08/21 0400  95      05/08/21 0349 98.2 °F (36.8 °C) 92 18 (!) 149/52 94 %   05/08/21 0000  (!) 103      05/07/21 2344 98.3 °F (36.8 °C) (!) 104 20 (!) 164/68 92 %   05/07/21 2000  99      05/07/21 1955 98.4 °F (36.9 °C) 97 22 (!) 162/62 93 %   05/07/21 1543 98.3 °F (36.8 °C) (!) 105 20 (!) 163/56 93 %     Oxygen Therapy  O2 Sat (%): 93 % (05/08/21 0814)  Pulse via Oximetry: 74 beats per minute (04/27/21 2026)  O2 Device: None (Room air) (05/07/21 2344)    Intake/Output Summary (Last 24 hours) at 5/8/2021 1214  Last data filed at 5/8/2021 0243  Gross per 24 hour   Intake 1687 ml   Output    Net 1687 ml       General:    Well nourished. Alert. No distress. 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 Extraocular movements intact. ENT:  Normocephalic, atraumatic. Moist mucous membranes  CV:   Regular rate and rhythm. No murmur, rub, or gallop. Lungs:  Clear to auscultation bilaterally. No wheezing, rhonchi, or rales. Abdomen: Soft, nontender, nondistended. Bowel sounds normal.   Extremities: Warm and dry. No cyanosis or edema. Neurologic: CN II-XII grossly intact. Sensation intact. Skin:     No rashes or jaundice. Psych:  Normal mood and affect. Discharge Info:   Current Discharge Medication List      START taking these medications    Details   bisacodyL (DULCOLAX) 10 mg supp Insert 10 mg into rectum daily as needed for Constipation. Qty: 7 Suppository, Refills: 0  Start date: 5/8/2021      hydrALAZINE (APRESOLINE) 50 mg tablet Take 1 Tab by mouth three (3) times daily. Qty: 90 Tab, Refills: 0  Start date: 5/8/2021      potassium chloride (K-DUR, KLOR-CON) 20 mEq tablet Take 1 Tab by mouth daily. Qty: 4 Tab, Refills: 0  Start date: 5/8/2021      cloNIDine HCL (CATAPRES) 0.1 mg tablet Take 1 Tab by mouth three (3) times daily as needed (For blood pressure systolic >479 mmhg even after taking hydralazine). Qty: 15 Tab, Refills: 0  Start date: 5/8/2021         CONTINUE these medications which have NOT CHANGED    Details   metFORMIN (GLUCOPHAGE) 1,000 mg tablet Take 1 Tab by mouth two (2) times daily (with meals).   Qty: 180 Tab, Refills: 1    Associated Diagnoses: Type 2 diabetes mellitus without complication, without long-term current use of insulin (HCC)      rosuvastatin (CRESTOR) 20 mg tablet Take 1 Tab by mouth nightly. Qty: 90 Tab, Refills: 1    Associated Diagnoses: Mixed hyperlipidemia      ondansetron (ZOFRAN ODT) 4 mg disintegrating tablet Take 1 Tab by mouth every eight (8) hours as needed for Nausea. Qty: 12 Tab, Refills: 0      omeprazole (PRILOSEC) 40 mg capsule TAKE ONE CAPSULE BY MOUTH TWICE A DAY WITH MEALS  Qty: 180 Cap, Refills: 0    Associated Diagnoses: Gastroesophageal reflux disease without esophagitis         STOP taking these medications       cephALEXin (Keflex) 500 mg capsule Comments:   Reason for Stopping:         glucose blood VI test strips (ONETOUCH ULTRA TEST) strip Comments:   Reason for Stopping:         dicyclomine (BENTYL) 20 mg tablet Comments:   Reason for Stopping:                 Disposition: home with home health  Activity: As per physical therapy. Diet: DIET NUTRITIONAL SUPPLEMENTS All Meals; Glucerna Shake  DIET GI SOFT No options chosen    Follow-up Appointments   Procedures    FOLLOW UP VISIT Appointment in: Other (Tom Venegas) With primary care     With primary care     Standing Status:   Standing     Number of Occurrences:   1     Order Specific Question:   Appointment in     Answer: Other (Specify)    FOLLOW UP VISIT Appointment in: One Week     Standing Status:   Standing     Number of Occurrences:   1     Standing Expiration Date:   5/9/2021     Order Specific Question:   Appointment in     Answer: One Week    FOLLOW UP VISIT Appointment in: One Week F/u with pcp in a week with bmp and magnesium. Continue GI soft diet for 1 week and transition to regular diabetic diet. Come back to ER if nay new abdominal pain /distention/ persistent nausea or vomiting. ... F/u with pcp in a week with bmp and magnesium. Continue GI soft diet for 1 week and transition to regular diabetic diet.   Come back to ER if nay new abdominal pain /distention/ persistent nausea or vomiting. Check bp at least 3 times daily. keep a log of it - show it to pcp at follow up. Hold bp medication if systolic less then 478 mmhg. Tale clonidine only systolic 087 mmhg or more even after taking hydralazine. Call pcp if have to take clonidine prn 3 times/day. Standing Status:   Standing     Number of Occurrences:   1     Standing Expiration Date:   5/9/2021     Order Specific Question:   Appointment in     Answer: One Week         Follow-up Information     Follow up With Specialties Details Why Contact Info    As needed with [facility]   And If symptoms worsen     Dhruv Nunez MD Internal Medicine   [address] [facility] [place]  RN/PT/OT  [address]  [phone]          Time spent in patient discharge planning and coordination 38 minutes.     Signed:  Yenny Paulino MD

## 2021-05-08 NOTE — PROGRESS NOTES
Problem: Self Care Deficits Care Plan (Adult)  Goal: *Acute Goals and Plan of Care (Insert Text)  Description: 1. Patient will perform grooming with supervision standing at sink. MET 5/8  2. Patient will perform Upper body dressing with supervision MET 5/8  3. Patient will perform lower body dressing with supervision PROGRESSING 5/8/21  4. Patient will perform upper and lower body bathing with supervision. Progressing 5/8/21  5. Patient will perform toilet transfers with CGA. Met 5/8  6. Patient will perform shower transfer with CGA. Met 5/8  7. Patient will participate in 30 + minutes of ADL/ therapeutic exercise/therapeutic activity with min rest breaks to increase activity tolerance for self care. met 5/8/21  8. Patient will perform ADL functional mobility in room with CGA. met 5/8/21    Goals to be achieved in 7 days. Outcome: Progressing Towards Goal     OCCUPATIONAL THERAPY: Daily Note and AM 5/8/2021  INPATIENT: OT Visit Days: 3  [place] / [place] / Product Type: Medicare /      NAME/AGE/GENDER: Gurmeet Rodney is a 68 y.o. female   PRIMARY DIAGNOSIS:  Acute gastroenteritis [K52.9] SBO (small bowel obstruction) ([facility]) SBO (small bowel obstruction) ([facility]     ICD-10: Treatment Diagnosis:    · Generalized Muscle Weakness (M62.81)  · Other lack of cordination (R27.8)   Precautions/Allergies:     Lortab [hydrocodone-acetaminophen] and Acetaminophen      ASSESSMENT:     Ms. Milvia Ruff presents supine with  present. Pt able to sup>sit with CGA and don underwear and socks at EOB with min A. Pt performed sit<>stand and functional mobility in room and hallway with CGA and HHA. Pt began to fatigue toward the end of session and is currently functioning below her baseline of being active and walking about 3 miles per day. Pt reports getting up and going to the bathroom earlier this AM. Pt left up in recliner with all needs met and in reach.  Continue per OT POC.    5/7/2021  Pt seen in room with PT and  present. Pt supine in bed and able to get to sitting with SBA. Pt up and ambulated household distance with minimal assistance. Pt weak but could go home with family support when medically stable     5/8/2021  Patient completed shower and dressing as charted below in ADL grid, in preparation for going home. Patient has met 6/8 goals and plans to return home with good family support. Family able to provide patient with appropriate level of assistance at this time. OT reviewed safety precautions throughout session and therapy schedule for the remainder of today. Patient instructed to call for assistance when needing to get up from recliner and all needs in reach. Patient verbalized understanding of call light. This section established at most recent assessment   PROBLEM LIST (Impairments causing functional limitations):  1. Decreased Strength  2. Decreased ADL/Functional Activities  3. Decreased Ambulation Ability/Technique  4. Decreased Balance  5. Increased Pain  6. Decreased Activity Tolerance   INTERVENTIONS PLANNED: (Benefits and precautions of occupational therapy have been discussed with the patient.)  1. Activities of daily living training  2. Adaptive equipment training  3. Balance training  4. Therapeutic activity  5. Therapeutic exercise     TREATMENT PLAN: Frequency/Duration: Follow patient 3 times per week to address above goals. Rehabilitation Potential For Stated Goals: Good     REHAB RECOMMENDATIONS (at time of discharge pending progress):    Placement: It is my opinion, based on this patient's performance to date, that Ms. Ryan Youssef may benefit from participating in 1-2 additional therapy sessions in order to continue to assess for rehab potential and then make recommendation for disposition at discharge.   Equipment:    None at this time              OCCUPATIONAL PROFILE AND HISTORY:   History of Present Injury/Illness (Reason for Referral):  See H&P  Past Medical History/Comorbidities:   Ms. Blaise Hayes  has a past medical history of DDD (degenerative disc disease), lumbar (2/19/2020), DM2 (diabetes mellitus, type 2) ([address] (3/4/2014), Gastroesophageal reflux disease without esophagitis (5/22/2015), HLD (hyperlipidemia), HTN (hypertension), Menopause, Sciatica of right side (2/19/2020), and UTI (urinary tract infection) (4/27/2021). She also has no past medical history of Asthma, Autoimmune disease ([address] CAD (coronary artery disease), Cancer ([address] Chronic kidney disease, COPD, Dementia, Heart failure ([address] Liver disease, Other ill-defined conditions(799.89), Psychiatric disorder, PUD (peptic ulcer disease), Seizures ([address] Sleep disorder, Stroke ([address] or Thromboembolus ([address] Ms. Blaise Hayes  has a past surgical history that includes hx hysterectomy; hx partial thyroidectomy; hx other surgical; hx colonoscopy (01/25/2010); and neurological procedure unlisted (2014).   Social History/Living Environment:   Home Environment: Private residence  One/Two Story Residence: Two story  Living Alone: No  Support Systems: Spouse/Significant Other/Partner  Patient Expects to be Discharged to[de-identified] Private residence  Current DME Used/Available at Home: None  Prior Level of Function/Work/Activity:  Independent      Number of Personal Factors/Comorbidities that affect the Plan of Care: Brief history (0):  LOW COMPLEXITY   ASSESSMENT OF OCCUPATIONAL PERFORMANCE[de-identified]   Activities of Daily Living:   Basic ADLs (From Assessment) Complex ADLs (From Assessment)   Feeding: Independent  Oral Facial Hygiene/Grooming: Setup, Contact guard assistance  Bathing: Minimum assistance  Upper Body Dressing: Minimum assistance  Lower Body Dressing: Minimum assistance  Toileting: Minimum assistance     Grooming/Bathing/Dressing Activities of Daily Living   Grooming  Grooming Assistance: Supervision(standing at sink)     Upper Body Bathing  Bathing Assistance: Supervision  Position Performed: Seated in chair  Cues: Verbal cues provided  Adaptive Equipment: Shower chair;Grab bar     Lower Body Bathing  Bathing Assistance: Minimum assistance  Position Performed: Standing;Seated in chair  Cues: Verbal cues provided  Adaptive Equipment: Grab bar Toileting  Toileting Assistance: Stand-by assistance  Cues: Verbal cues provided  Adaptive Equipment: Grab bars   Upper Body Dressing Assistance  Dressing Assistance: Stand-by assistance Functional Transfers  Bathroom Mobility: Contact guard assistance  Toilet Transfer : Contact guard assistance  Shower Transfer: Contact guard assistance  Cues: Verbal cues provided  Adaptive Equipment: Grab bars; Shower chair with back   Lower Body Dressing Assistance  Dressing Assistance: Minimum assistance  Cues: Verbal cues provided       Most Recent Physical Functioning:   Gross Assessment:                  Posture:  Posture (WDL): Within defined limits  Balance:  Sitting: Intact  Standing: Pull to stand; With support Bed Mobility:     Wheelchair Mobility:     Transfers:               Patient Vitals for the past 6 hrs:   BP BP Patient Position SpO2 Pulse   21 1155 (!) 166/61 At rest;Sitting 94 % 95       Mental Status  Neurologic State: Alert, Appropriate for age  Orientation Level: Oriented X4  Cognition: Appropriate decision making, Appropriate for age attention/concentration, Appropriate safety awareness  Perception: Appears intact  Perseveration: No perseveration noted  Safety/Judgement: Awareness of environment                          Physical Skills Involved:  1. Range of Motion  2. Balance  3. Strength Cognitive Skills Affected (resulting in the inability to perform in a timely and safe manner): 1. none Psychosocial Skills Affected:  1.  Environmental Adaptation   Number of elements that affect the Plan of Care: 3-5:  MODERATE COMPLEXITY   CLINICAL DECISION MAKIN [facility] [address] [address] AM-PAC 6 Clicks   Daily Activity Inpatient Short Form  How much help from another person does the patient currently need... Total A Lot A Little None   1. Putting on and taking off regular lower body clothing? [] 1   [] 2   [x] 3   [] 4   2. Bathing (including washing, rinsing, drying)? [] 1   [] 2   [x] 3   [] 4   3. Toileting, which includes using toilet, bedpan or urinal?   [] 1   [] 2   [] 3   [x] 4   4. Putting on and taking off regular upper body clothing? [] 1   [] 2   [] 3   [x] 4   5. Taking care of personal grooming such as brushing teeth? [] 1   [] 2   [] 3   [x] 4   6. Eating meals? [] 1   [] 2   [] 3   [x] 4   © 2007, Trustees of [facility] [employer], under license to [employer]. All rights reserved      Score:  Initial: 19 Most Recent: 22 (Date: 5/8/2021)    Interpretation of Tool:  Represents activities that are increasingly more difficult (i.e. Bed mobility, Transfers, Gait). Use of outcome tool(s) and clinical judgement create a POC that gives a: LOW COMPLEXITY         TREATMENT:   (In addition to Assessment/Re-Assessment sessions the following treatments were rendered)     Pre-treatment Symptoms/Complaints:    Pain: Initial:     0 Post Session:  0       Self Care: (55 min): Procedure(s) (per grid) utilized to improve and/or restore self-care/home management as related to dressing, bathing, toileting and grooming. Required minimal verbal, manual and   cueing to facilitate activities of daily living skills and compensatory activities. Braces/Orthotics/Lines/Etc:   · IV  · O2 Device: None (Room air)  Treatment/Session Assessment:    · Response to Treatment:  pt up in room and to bathroom tolerated well  · Interdisciplinary Collaboration:   o Physical Therapist  o Occupational Therapist  o Registered Nurse  · After treatment position/precautions:   o Up in chair  o Bed/Chair-wheels locked  o Call light within reach  o RN notified  o Family at bedside   · Compliance with Program/Exercises: Compliant all of the time.   Total Treatment Duration:  OT Patient Time In/Time Out  Time In: 1005  Time Out: Lola Hunter

## 2021-05-08 NOTE — DISCHARGE INSTRUCTIONS
F/u with pcp in a week with bmp and magnesium. Continue GI soft diet for 1 week and transition to regular diabetic diet. Come back to ER if nay new abdominal pain /distention/ persistent nausea or vomiting. Check bp at least 3 times daily. keep a log of it - show it to pcp at follow up. Hold bp medication if systolic less then 137 mmhg. Tale clonidine only systolic 722 mmhg or more even after taking hydralazine. Call pcp if have to take clonidine prn 3 times/day.

## 2021-05-08 NOTE — PROGRESS NOTES
Problem: Falls - Risk of  Goal: *Absence of Falls  Description: Document Bhakti Spann Fall Risk and appropriate interventions in the flowsheet.   Outcome: Progressing Towards Goal  Note: Fall Risk Interventions:  Mobility Interventions: Communicate number of staff needed for ambulation/transfer, Patient to call before getting OOB         Medication Interventions: Teach patient to arise slowly, Patient to call before getting OOB    Elimination Interventions: Call light in reach, Patient to call for help with toileting needs

## 2021-05-08 NOTE — PROGRESS NOTES
Patient is anticipated to discharge today. Plan for discharge is as follows:    Care Management Interventions  PCP Verified by CM: Yes  Palliative Care Criteria Met (RRAT>21 & CHF Dx)?: No(Dx Gastrenteritis Risk 9%)  Mode of Transport at Discharge: Self  Transition of Care Consult (CM Consult): Discharge Planning  Discharge Durable Medical Equipment: No  Physical Therapy Consult: Yes  Occupational Therapy Consult: Yes  Speech Therapy Consult: No  Current Support Network: Lives with Spouse  Confirm Follow Up Transport: Family  The Plan for Transition of Care is Related to the Following Treatment Goals : debility  The Patient and/or Patient Representative was Provided with a Choice of Provider and Agrees with the Discharge Plan?: Yes  Name of the Patient Representative Who was Provided with a Choice of Provider and Agrees with the Discharge Plan: Patient   Freedom of Choice List was Provided with Basic Dialogue that Supports the Patient's Individualized Plan of Care/Goals, Treatment Preferences and Shares the Quality Data Associated with the Providers?: Yes  Discharge Location  Discharge Placement: Home with home health    Milestones and interventions have been entered.      Lisset Johns LMSW    [address]    * [email or web address]    ( [phone]

## 2021-05-08 NOTE — PROGRESS NOTES
at the bedside  Pt says feeling good. no dizzy or head heaviness felling  On telemetry no acute findings. Normal tsh  Will dc fluids. Able to walk with me in room , no dizzy feeling.

## 2021-05-09 LAB
ATRIAL RATE: 83 BPM
CALCULATED P AXIS, ECG09: 14 DEGREES
CALCULATED R AXIS, ECG10: -5 DEGREES
CALCULATED T AXIS, ECG11: 10 DEGREES
DIAGNOSIS, 93000: NORMAL
P-R INTERVAL, ECG05: 112 MS
Q-T INTERVAL, ECG07: 364 MS
QRS DURATION, ECG06: 68 MS
QTC CALCULATION (BEZET), ECG08: 427 MS
VENTRICULAR RATE, ECG03: 83 BPM

## 2021-05-10 ENCOUNTER — HOME CARE VISIT (OUTPATIENT)
Dept: SCHEDULING | Facility: HOME HEALTH | Age: 81
End: 2021-05-10
Payer: MEDICARE

## 2021-05-10 VITALS
SYSTOLIC BLOOD PRESSURE: 142 MMHG | OXYGEN SATURATION: 97 % | TEMPERATURE: 97.7 F | RESPIRATION RATE: 16 BRPM | HEART RATE: 62 BPM | DIASTOLIC BLOOD PRESSURE: 68 MMHG

## 2021-05-10 PROCEDURE — 400013 HH SOC

## 2021-05-10 PROCEDURE — 400018 HH-NO PAY CLAIM PROCEDURE

## 2021-05-10 PROCEDURE — 3331090002 HH PPS REVENUE DEBIT

## 2021-05-10 PROCEDURE — 3331090001 HH PPS REVENUE CREDIT

## 2021-05-10 PROCEDURE — G0151 HHCP-SERV OF PT,EA 15 MIN: HCPCS

## 2021-05-10 NOTE — PROGRESS NOTES
Called pt, spoke to . Teresaelena Hendricksoncampos pt is doing fine. Her heart rate was fluctuating from 85 to 105/min. Called one of his friend from [place] , who prescribed betablocker. Now heart rate in 60s. Explained  that pt needs to see pcp and repeat the labs and also need Gastroenterology follow up in 4 to 6 weeks.  expressed understanding. No questions unanswered.

## 2021-05-11 PROCEDURE — 3331090001 HH PPS REVENUE CREDIT

## 2021-05-11 PROCEDURE — 3331090002 HH PPS REVENUE DEBIT

## 2021-05-12 ENCOUNTER — HOME CARE VISIT (OUTPATIENT)
Dept: HOME HEALTH SERVICES | Facility: HOME HEALTH | Age: 81
End: 2021-05-12
Payer: MEDICARE

## 2021-05-12 ENCOUNTER — HOME CARE VISIT (OUTPATIENT)
Dept: SCHEDULING | Facility: HOME HEALTH | Age: 81
End: 2021-05-12
Payer: MEDICARE

## 2021-05-12 VITALS
SYSTOLIC BLOOD PRESSURE: 170 MMHG | TEMPERATURE: 98 F | RESPIRATION RATE: 16 BRPM | DIASTOLIC BLOOD PRESSURE: 60 MMHG | OXYGEN SATURATION: 98 % | HEART RATE: 67 BPM

## 2021-05-12 PROCEDURE — 3331090001 HH PPS REVENUE CREDIT

## 2021-05-12 PROCEDURE — G0299 HHS/HOSPICE OF RN EA 15 MIN: HCPCS

## 2021-05-12 PROCEDURE — 3331090002 HH PPS REVENUE DEBIT

## 2021-05-13 PROCEDURE — 3331090001 HH PPS REVENUE CREDIT

## 2021-05-13 PROCEDURE — 3331090002 HH PPS REVENUE DEBIT

## 2021-05-14 ENCOUNTER — HOME CARE VISIT (OUTPATIENT)
Dept: SCHEDULING | Facility: HOME HEALTH | Age: 81
End: 2021-05-14
Payer: MEDICARE

## 2021-05-14 VITALS
TEMPERATURE: 97.8 F | SYSTOLIC BLOOD PRESSURE: 136 MMHG | HEART RATE: 69 BPM | OXYGEN SATURATION: 98 % | DIASTOLIC BLOOD PRESSURE: 52 MMHG | RESPIRATION RATE: 14 BRPM

## 2021-05-14 VITALS
SYSTOLIC BLOOD PRESSURE: 138 MMHG | TEMPERATURE: 98.4 F | RESPIRATION RATE: 18 BRPM | HEART RATE: 67 BPM | OXYGEN SATURATION: 98 % | DIASTOLIC BLOOD PRESSURE: 54 MMHG

## 2021-05-14 PROCEDURE — G0299 HHS/HOSPICE OF RN EA 15 MIN: HCPCS

## 2021-05-14 PROCEDURE — G0157 HHC PT ASSISTANT EA 15: HCPCS

## 2021-05-14 PROCEDURE — 3331090002 HH PPS REVENUE DEBIT

## 2021-05-14 PROCEDURE — G0152 HHCP-SERV OF OT,EA 15 MIN: HCPCS

## 2021-05-14 PROCEDURE — 3331090001 HH PPS REVENUE CREDIT

## 2021-05-14 NOTE — CASE COMMUNICATION
Pt's SBP was slightly elevated, and DBP was low and outside of parameters this visit. Pt reported that she didn't feel quite right and hasn't since coming home from the hospital. Pt and her  report that they have a visit with the cardiologist this coming Tuesday. Will continue to follow, but wanted to ensure that the pt's PCP was aware of BP abnormalities. Awaiting further necessary correspondence.

## 2021-05-15 PROCEDURE — 3331090002 HH PPS REVENUE DEBIT

## 2021-05-15 PROCEDURE — 3331090001 HH PPS REVENUE CREDIT

## 2021-05-16 VITALS
OXYGEN SATURATION: 96 % | HEART RATE: 62 BPM | DIASTOLIC BLOOD PRESSURE: 60 MMHG | TEMPERATURE: 97.4 F | RESPIRATION RATE: 16 BRPM | SYSTOLIC BLOOD PRESSURE: 130 MMHG

## 2021-05-16 PROCEDURE — 3331090002 HH PPS REVENUE DEBIT

## 2021-05-16 PROCEDURE — 3331090001 HH PPS REVENUE CREDIT

## 2021-05-17 ENCOUNTER — HOME CARE VISIT (OUTPATIENT)
Dept: SCHEDULING | Facility: HOME HEALTH | Age: 81
End: 2021-05-17
Payer: MEDICARE

## 2021-05-17 PROBLEM — N39.0 UTI (URINARY TRACT INFECTION): Status: RESOLVED | Noted: 2021-04-27 | Resolved: 2021-05-17

## 2021-05-17 PROBLEM — K56.609 SBO (SMALL BOWEL OBSTRUCTION) (HCC): Status: RESOLVED | Noted: 2021-04-27 | Resolved: 2021-05-17

## 2021-05-17 PROCEDURE — 3331090002 HH PPS REVENUE DEBIT

## 2021-05-17 PROCEDURE — 3331090001 HH PPS REVENUE CREDIT

## 2021-05-18 PROCEDURE — 3331090001 HH PPS REVENUE CREDIT

## 2021-05-18 PROCEDURE — 3331090002 HH PPS REVENUE DEBIT

## 2021-05-19 ENCOUNTER — HOME CARE VISIT (OUTPATIENT)
Dept: SCHEDULING | Facility: HOME HEALTH | Age: 81
End: 2021-05-19
Payer: MEDICARE

## 2021-05-19 VITALS
TEMPERATURE: 98.8 F | HEART RATE: 64 BPM | SYSTOLIC BLOOD PRESSURE: 136 MMHG | OXYGEN SATURATION: 98 % | DIASTOLIC BLOOD PRESSURE: 68 MMHG | RESPIRATION RATE: 15 BRPM

## 2021-05-19 VITALS
RESPIRATION RATE: 16 BRPM | OXYGEN SATURATION: 98 % | HEART RATE: 64 BPM | DIASTOLIC BLOOD PRESSURE: 68 MMHG | SYSTOLIC BLOOD PRESSURE: 136 MMHG | TEMPERATURE: 98.1 F

## 2021-05-19 PROCEDURE — G0157 HHC PT ASSISTANT EA 15: HCPCS

## 2021-05-19 PROCEDURE — G0158 HHC OT ASSISTANT EA 15: HCPCS

## 2021-05-19 PROCEDURE — 3331090001 HH PPS REVENUE CREDIT

## 2021-05-19 PROCEDURE — 3331090002 HH PPS REVENUE DEBIT

## 2021-05-19 NOTE — CASE COMMUNICATION
Patient reports new medication at visit. Patient is taking clorthalidone 25mg, taking 1 tablet by mouth daily for BP regulation. Started on 5/18/21 as recommended by Dr. Jess Redd. Dr Jess Redd also took the pt off of Bisacodyl 10mg, Clonidine 0.1mg, and Hydralazine 50mg effective 5/18/21.

## 2021-05-19 NOTE — Clinical Note
med list updated; no severe interactions.     ----- Message -----  From: Solange Louis  Sent: 5/19/2021   5:13 PM EDT  To: Sydnee Teofilo      Patient reports new medication at visit. Patient is taking clorthalidone 25mg, taking 1 tablet by mouth daily for BP regulation. Started on 5/18/21 as recommended by Dr. Lizzie Hauser. Dr Lizzie Hauser also took the pt off of Bisacodyl 10mg, Clonidine 0.1mg, and Hydralazine 50mg effective 5/18/21.

## 2021-05-19 NOTE — PROGRESS NOTES
Physician Progress Note      eRyes Knapp  [facility] #:                  [id number]  :                       1940  ADMIT DATE:       2021 5:37 PM  [address] DATE:        2021 12:57 PM  RESPONDING  PROVIDER #:        Miguelito Fraser MD          QUERY TEXT:    Patient admitted with UTI. In order to support the diagnosis of UTI, please include additional clinical indicators in your documentation. Or please document if the diagnosis of UTI has been ruled out after further study. The medical record reflects the following:  Risk Factors: elderly female patient, DM2, history of UTI  Clinical Indicators: UA with 4+ bacteria wbc 5-10  Treatment: IV rocephin  Options provided:  -- *UTI present as evidenced by, Please document evidence.   -- UTI was ruled out  -- Other - I will add my own diagnosis  -- Disagree - Not applicable / Not valid  -- Disagree - Clinically unable to determine / Unknown  -- Refer to Clinical Documentation Reviewer    PROVIDER RESPONSE TEXT:    UTI is present as evidenced by UA with 4+ bacteria wbc 5-10    Query created by: Tiffany Page on 2021 10:49 AM      Electronically signed by:  Miguelito Fraser MD 2021 7:11 AM

## 2021-05-20 ENCOUNTER — HOME CARE VISIT (OUTPATIENT)
Dept: SCHEDULING | Facility: HOME HEALTH | Age: 81
End: 2021-05-20
Payer: MEDICARE

## 2021-05-20 VITALS
OXYGEN SATURATION: 98 % | RESPIRATION RATE: 20 BRPM | DIASTOLIC BLOOD PRESSURE: 64 MMHG | TEMPERATURE: 99.2 F | SYSTOLIC BLOOD PRESSURE: 128 MMHG | HEART RATE: 60 BPM

## 2021-05-20 PROCEDURE — G0299 HHS/HOSPICE OF RN EA 15 MIN: HCPCS

## 2021-05-20 PROCEDURE — 3331090001 HH PPS REVENUE CREDIT

## 2021-05-20 PROCEDURE — 3331090002 HH PPS REVENUE DEBIT

## 2021-05-21 ENCOUNTER — HOME CARE VISIT (OUTPATIENT)
Dept: SCHEDULING | Facility: HOME HEALTH | Age: 81
End: 2021-05-21
Payer: MEDICARE

## 2021-05-21 VITALS
HEART RATE: 72 BPM | OXYGEN SATURATION: 98 % | RESPIRATION RATE: 16 BRPM | SYSTOLIC BLOOD PRESSURE: 124 MMHG | TEMPERATURE: 98.4 F | DIASTOLIC BLOOD PRESSURE: 65 MMHG

## 2021-05-21 VITALS
DIASTOLIC BLOOD PRESSURE: 60 MMHG | TEMPERATURE: 98.5 F | OXYGEN SATURATION: 98 % | RESPIRATION RATE: 15 BRPM | HEART RATE: 64 BPM | SYSTOLIC BLOOD PRESSURE: 148 MMHG

## 2021-05-21 PROCEDURE — G0157 HHC PT ASSISTANT EA 15: HCPCS

## 2021-05-21 PROCEDURE — G0158 HHC OT ASSISTANT EA 15: HCPCS

## 2021-05-21 PROCEDURE — 3331090002 HH PPS REVENUE DEBIT

## 2021-05-21 PROCEDURE — 3331090001 HH PPS REVENUE CREDIT

## 2021-05-22 PROCEDURE — 3331090001 HH PPS REVENUE CREDIT

## 2021-05-22 PROCEDURE — 3331090002 HH PPS REVENUE DEBIT

## 2021-05-23 PROCEDURE — 3331090001 HH PPS REVENUE CREDIT

## 2021-05-23 PROCEDURE — 3331090002 HH PPS REVENUE DEBIT

## 2021-05-24 ENCOUNTER — HOME CARE VISIT (OUTPATIENT)
Dept: SCHEDULING | Facility: HOME HEALTH | Age: 81
End: 2021-05-24
Payer: MEDICARE

## 2021-05-24 VITALS
TEMPERATURE: 98.8 F | OXYGEN SATURATION: 99 % | HEART RATE: 71 BPM | SYSTOLIC BLOOD PRESSURE: 132 MMHG | RESPIRATION RATE: 16 BRPM | DIASTOLIC BLOOD PRESSURE: 62 MMHG

## 2021-05-24 VITALS
RESPIRATION RATE: 16 BRPM | SYSTOLIC BLOOD PRESSURE: 116 MMHG | HEART RATE: 60 BPM | TEMPERATURE: 98 F | DIASTOLIC BLOOD PRESSURE: 62 MMHG | OXYGEN SATURATION: 98 %

## 2021-05-24 PROCEDURE — G0299 HHS/HOSPICE OF RN EA 15 MIN: HCPCS

## 2021-05-24 PROCEDURE — 3331090001 HH PPS REVENUE CREDIT

## 2021-05-24 PROCEDURE — G0152 HHCP-SERV OF OT,EA 15 MIN: HCPCS

## 2021-05-24 PROCEDURE — 3331090002 HH PPS REVENUE DEBIT

## 2021-05-25 ENCOUNTER — HOME CARE VISIT (OUTPATIENT)
Dept: SCHEDULING | Facility: HOME HEALTH | Age: 81
End: 2021-05-25
Payer: MEDICARE

## 2021-05-25 VITALS
OXYGEN SATURATION: 98 % | TEMPERATURE: 98.2 F | HEART RATE: 60 BPM | SYSTOLIC BLOOD PRESSURE: 126 MMHG | DIASTOLIC BLOOD PRESSURE: 60 MMHG | RESPIRATION RATE: 16 BRPM

## 2021-05-25 PROCEDURE — G0157 HHC PT ASSISTANT EA 15: HCPCS

## 2021-05-25 PROCEDURE — 3331090002 HH PPS REVENUE DEBIT

## 2021-05-25 PROCEDURE — 3331090001 HH PPS REVENUE CREDIT

## 2021-05-26 PROCEDURE — 3331090001 HH PPS REVENUE CREDIT

## 2021-05-26 PROCEDURE — 3331090002 HH PPS REVENUE DEBIT

## 2021-05-27 ENCOUNTER — HOME CARE VISIT (OUTPATIENT)
Dept: SCHEDULING | Facility: HOME HEALTH | Age: 81
End: 2021-05-27
Payer: MEDICARE

## 2021-05-27 VITALS
OXYGEN SATURATION: 99 % | DIASTOLIC BLOOD PRESSURE: 62 MMHG | HEART RATE: 68 BPM | TEMPERATURE: 97.5 F | SYSTOLIC BLOOD PRESSURE: 118 MMHG | RESPIRATION RATE: 16 BRPM

## 2021-05-27 VITALS
TEMPERATURE: 97.8 F | RESPIRATION RATE: 16 BRPM | SYSTOLIC BLOOD PRESSURE: 128 MMHG | DIASTOLIC BLOOD PRESSURE: 60 MMHG | OXYGEN SATURATION: 98 % | HEART RATE: 64 BPM

## 2021-05-27 PROCEDURE — G0157 HHC PT ASSISTANT EA 15: HCPCS

## 2021-05-27 PROCEDURE — 3331090001 HH PPS REVENUE CREDIT

## 2021-05-27 PROCEDURE — G0299 HHS/HOSPICE OF RN EA 15 MIN: HCPCS

## 2021-05-27 PROCEDURE — 3331090002 HH PPS REVENUE DEBIT

## 2021-05-28 PROCEDURE — 3331090001 HH PPS REVENUE CREDIT

## 2021-05-28 PROCEDURE — 3331090002 HH PPS REVENUE DEBIT

## 2021-05-29 PROCEDURE — 3331090001 HH PPS REVENUE CREDIT

## 2021-05-29 PROCEDURE — 3331090002 HH PPS REVENUE DEBIT

## 2021-05-30 PROCEDURE — 3331090001 HH PPS REVENUE CREDIT

## 2021-05-30 PROCEDURE — 3331090002 HH PPS REVENUE DEBIT

## 2021-05-31 PROCEDURE — 3331090001 HH PPS REVENUE CREDIT

## 2021-05-31 PROCEDURE — 3331090002 HH PPS REVENUE DEBIT

## 2021-06-01 ENCOUNTER — HOME CARE VISIT (OUTPATIENT)
Dept: SCHEDULING | Facility: HOME HEALTH | Age: 81
End: 2021-06-01
Payer: MEDICARE

## 2021-06-01 VITALS
DIASTOLIC BLOOD PRESSURE: 62 MMHG | HEART RATE: 62 BPM | SYSTOLIC BLOOD PRESSURE: 122 MMHG | TEMPERATURE: 98.1 F | RESPIRATION RATE: 17 BRPM

## 2021-06-01 PROCEDURE — 3331090002 HH PPS REVENUE DEBIT

## 2021-06-01 PROCEDURE — 3331090001 HH PPS REVENUE CREDIT

## 2021-06-01 PROCEDURE — G0151 HHCP-SERV OF PT,EA 15 MIN: HCPCS

## 2021-09-01 ENCOUNTER — HOSPITAL ENCOUNTER (OUTPATIENT)
Dept: LAB | Age: 81
Discharge: HOME | End: 2021-09-01

## 2021-09-01 PROCEDURE — 88305 TISSUE EXAM BY PATHOLOGIST: CPT

## 2022-02-11 ENCOUNTER — TRANSCRIBE ORDER (OUTPATIENT)
Dept: SCHEDULING | Age: 82
End: 2022-02-11

## 2022-02-11 DIAGNOSIS — Z12.31 SCREENING MAMMOGRAM FOR HIGH-RISK PATIENT: Primary | ICD-10-CM

## 2022-02-18 ENCOUNTER — HOSPITAL ENCOUNTER (OUTPATIENT)
Dept: MAMMOGRAPHY | Age: 82
Discharge: HOME | End: 2022-02-18
Attending: PHYSICIAN ASSISTANT
Payer: MEDICARE

## 2022-02-18 DIAGNOSIS — Z12.31 SCREENING MAMMOGRAM FOR HIGH-RISK PATIENT: ICD-10-CM

## 2022-02-18 PROCEDURE — 77067 SCR MAMMO BI INCL CAD: CPT

## 2022-03-18 PROBLEM — E83.42 HYPOMAGNESEMIA: Status: ACTIVE | Noted: 2021-05-01

## 2022-03-18 PROBLEM — E87.6 HYPOKALEMIA: Status: ACTIVE | Noted: 2021-05-05

## 2022-03-18 PROBLEM — D72.829 LEUKOCYTOSIS: Status: ACTIVE | Noted: 2021-04-27

## 2022-03-18 PROBLEM — M54.16 LUMBAR RADICULOPATHY: Status: ACTIVE | Noted: 2020-02-19

## 2022-03-19 PROBLEM — K21.9 GASTROESOPHAGEAL REFLUX DISEASE WITHOUT ESOPHAGITIS: Status: ACTIVE | Noted: 2017-06-07

## 2022-03-19 PROBLEM — M51.36 DDD (DEGENERATIVE DISC DISEASE), LUMBAR: Status: ACTIVE | Noted: 2020-02-19
